# Patient Record
Sex: FEMALE | Race: WHITE | NOT HISPANIC OR LATINO
[De-identification: names, ages, dates, MRNs, and addresses within clinical notes are randomized per-mention and may not be internally consistent; named-entity substitution may affect disease eponyms.]

---

## 2017-01-24 ENCOUNTER — APPOINTMENT (OUTPATIENT)
Dept: CARDIOLOGY | Facility: CLINIC | Age: 37
End: 2017-01-24

## 2017-01-30 ENCOUNTER — APPOINTMENT (OUTPATIENT)
Dept: CARDIOLOGY | Facility: CLINIC | Age: 37
End: 2017-01-30

## 2018-06-07 ENCOUNTER — APPOINTMENT (OUTPATIENT)
Dept: FAMILY MEDICINE | Facility: CLINIC | Age: 38
End: 2018-06-07
Payer: COMMERCIAL

## 2018-06-07 VITALS
RESPIRATION RATE: 15 BRPM | SYSTOLIC BLOOD PRESSURE: 106 MMHG | OXYGEN SATURATION: 98 % | HEIGHT: 61 IN | WEIGHT: 138 LBS | BODY MASS INDEX: 26.06 KG/M2 | TEMPERATURE: 97.8 F | HEART RATE: 95 BPM | DIASTOLIC BLOOD PRESSURE: 74 MMHG

## 2018-06-07 DIAGNOSIS — T78.40XA ALLERGY, UNSPECIFIED, INITIAL ENCOUNTER: ICD-10-CM

## 2018-06-07 DIAGNOSIS — Z87.891 PERSONAL HISTORY OF NICOTINE DEPENDENCE: ICD-10-CM

## 2018-06-07 DIAGNOSIS — Z80.52 FAMILY HISTORY OF MALIGNANT NEOPLASM OF BLADDER: ICD-10-CM

## 2018-06-07 DIAGNOSIS — Z87.09 PERSONAL HISTORY OF OTHER DISEASES OF THE RESPIRATORY SYSTEM: ICD-10-CM

## 2018-06-07 DIAGNOSIS — J06.9 ACUTE UPPER RESPIRATORY INFECTION, UNSPECIFIED: ICD-10-CM

## 2018-06-07 DIAGNOSIS — J30.9 ALLERGIC RHINITIS, UNSPECIFIED: ICD-10-CM

## 2018-06-07 DIAGNOSIS — L65.9 NONSCARRING HAIR LOSS, UNSPECIFIED: ICD-10-CM

## 2018-06-07 LAB
FLUAV SPEC QL CULT: NORMAL
FLUBV AG SPEC QL IA: NORMAL
S PYO AG SPEC QL IA: NORMAL

## 2018-06-07 PROCEDURE — 96372 THER/PROPH/DIAG INJ SC/IM: CPT | Mod: 59

## 2018-06-07 PROCEDURE — 99213 OFFICE O/P EST LOW 20 MIN: CPT | Mod: 25

## 2018-06-07 PROCEDURE — 87804 INFLUENZA ASSAY W/OPTIC: CPT | Mod: 59,QW

## 2018-06-07 PROCEDURE — 87880 STREP A ASSAY W/OPTIC: CPT | Mod: QW

## 2018-06-07 RX ORDER — LEVOCETIRIZINE DIHYDROCHLORIDE 5 MG/1
5 TABLET ORAL DAILY
Qty: 90 | Refills: 2 | Status: ACTIVE | COMMUNITY
Start: 2018-06-07 | End: 1900-01-01

## 2018-06-07 RX ORDER — AZELASTINE HYDROCHLORIDE AND FLUTICASONE PROPIONATE 137; 50 UG/1; UG/1
137-50 SPRAY, METERED NASAL
Qty: 1 | Refills: 2 | Status: ACTIVE | COMMUNITY
Start: 2018-06-07 | End: 1900-01-01

## 2018-06-07 RX ORDER — FLUTICASONE PROPIONATE 50 UG/1
50 SPRAY, METERED NASAL DAILY
Qty: 1 | Refills: 2 | Status: ACTIVE | COMMUNITY
Start: 2018-06-07 | End: 1900-01-01

## 2018-06-07 RX ORDER — DEXAMETHASONE SODIUM PHOSPHATE 10 MG/ML
10 INJECTION INTRAMUSCULAR; INTRAVENOUS
Qty: 0 | Refills: 0 | Status: COMPLETED | OUTPATIENT
Start: 2018-06-07

## 2018-06-07 RX ADMIN — Medication 1.5 MG/ML: at 00:00

## 2018-06-07 NOTE — REVIEW OF SYSTEMS
[Fever] : fever [Chills] : chills [Fatigue] : fatigue [Earache] : earache [Hoarseness] : hoarseness [Nasal Discharge] : nasal discharge [Sore Throat] : sore throat [Negative] : Gastrointestinal

## 2018-06-07 NOTE — PHYSICAL EXAM
[No Acute Distress] : no acute distress [Well Nourished] : well nourished [No Respiratory Distress] : no respiratory distress  [Clear to Auscultation] : lungs were clear to auscultation bilaterally [Normal Rate] : normal rate  [Regular Rhythm] : with a regular rhythm [Normal S1, S2] : normal S1 and S2 [de-identified] : difficulty speaking due to sore throat. pharynx erythema noted

## 2018-06-07 NOTE — HISTORY OF PRESENT ILLNESS
[Earache (L)] : pain in left ear [___ Days ago] : [unfilled] days ago [Congestion] : congestion [Chills] : chills [Earache] : earache [Fatigue] : fatigue [Headache] : headache [Stable] : stable [de-identified] : swollen glands [FreeTextEntry2] : body aches, feverish [FreeTextEntry8] : pt c/o possible sinus  inf./ allergies. Pt states her throat is inflamed hard to swallow and constanlty trying to clear it. Throat feels heavy. She states she was in the park on [place]day and was around a lot of mosquitos.

## 2018-11-16 ENCOUNTER — APPOINTMENT (OUTPATIENT)
Dept: FAMILY MEDICINE | Facility: CLINIC | Age: 38
End: 2018-11-16

## 2018-11-19 ENCOUNTER — APPOINTMENT (OUTPATIENT)
Dept: FAMILY MEDICINE | Facility: CLINIC | Age: 38
End: 2018-11-19
Payer: COMMERCIAL

## 2018-11-19 DIAGNOSIS — Z87.19 PERSONAL HISTORY OF OTHER DISEASES OF THE DIGESTIVE SYSTEM: ICD-10-CM

## 2018-11-19 DIAGNOSIS — Z87.898 PERSONAL HISTORY OF OTHER SPECIFIED CONDITIONS: ICD-10-CM

## 2018-11-19 DIAGNOSIS — J98.01 ACUTE BRONCHOSPASM: ICD-10-CM

## 2018-11-19 DIAGNOSIS — J30.1 ALLERGIC RHINITIS DUE TO POLLEN: ICD-10-CM

## 2018-11-19 DIAGNOSIS — Z87.09 PERSONAL HISTORY OF OTHER DISEASES OF THE RESPIRATORY SYSTEM: ICD-10-CM

## 2018-11-19 DIAGNOSIS — R09.81 NASAL CONGESTION: ICD-10-CM

## 2018-11-19 DIAGNOSIS — R53.81 OTHER MALAISE: ICD-10-CM

## 2018-11-19 DIAGNOSIS — Z87.39 PERSONAL HISTORY OF OTHER DISEASES OF THE MUSCULOSKELETAL SYSTEM AND CONNECTIVE TISSUE: ICD-10-CM

## 2018-11-19 DIAGNOSIS — M50.30 OTHER CERVICAL DISC DEGENERATION, UNSPECIFIED CERVICAL REGION: ICD-10-CM

## 2018-11-19 DIAGNOSIS — M25.561 PAIN IN RIGHT KNEE: ICD-10-CM

## 2018-11-19 DIAGNOSIS — A08.8 OTHER SPECIFIED INTESTINAL INFECTIONS: ICD-10-CM

## 2018-11-19 DIAGNOSIS — M75.101 UNSPECIFIED ROTATOR CUFF TEAR OR RUPTURE OF RIGHT SHOULDER, NOT SPECIFIED AS TRAUMATIC: ICD-10-CM

## 2018-11-19 DIAGNOSIS — J00 ACUTE NASOPHARYNGITIS [COMMON COLD]: ICD-10-CM

## 2018-11-19 DIAGNOSIS — Z87.440 PERSONAL HISTORY OF URINARY (TRACT) INFECTIONS: ICD-10-CM

## 2018-11-19 DIAGNOSIS — R25.2 CRAMP AND SPASM: ICD-10-CM

## 2018-11-19 DIAGNOSIS — J06.9 ACUTE UPPER RESPIRATORY INFECTION, UNSPECIFIED: ICD-10-CM

## 2018-11-19 DIAGNOSIS — R42 DIZZINESS AND GIDDINESS: ICD-10-CM

## 2018-11-19 DIAGNOSIS — R60.0 LOCALIZED EDEMA: ICD-10-CM

## 2018-11-19 DIAGNOSIS — F32.9 MAJOR DEPRESSIVE DISORDER, SINGLE EPISODE, UNSPECIFIED: ICD-10-CM

## 2018-11-19 DIAGNOSIS — R59.9 ENLARGED LYMPH NODES, UNSPECIFIED: ICD-10-CM

## 2018-11-19 DIAGNOSIS — Z86.39 PERSONAL HISTORY OF OTHER ENDOCRINE, NUTRITIONAL AND METABOLIC DISEASE: ICD-10-CM

## 2018-11-19 DIAGNOSIS — R53.83 OTHER MALAISE: ICD-10-CM

## 2018-11-19 DIAGNOSIS — Z01.818 ENCOUNTER FOR OTHER PREPROCEDURAL EXAMINATION: ICD-10-CM

## 2018-11-19 DIAGNOSIS — R22.0 LOCALIZED SWELLING, MASS AND LUMP, HEAD: ICD-10-CM

## 2018-11-19 DIAGNOSIS — Z86.19 PERSONAL HISTORY OF OTHER INFECTIOUS AND PARASITIC DISEASES: ICD-10-CM

## 2018-11-19 DIAGNOSIS — J45.901 UNSPECIFIED ASTHMA WITH (ACUTE) EXACERBATION: ICD-10-CM

## 2018-11-19 DIAGNOSIS — M25.9 JOINT DISORDER, UNSPECIFIED: ICD-10-CM

## 2018-11-19 DIAGNOSIS — R22.1 LOCALIZED SWELLING, MASS AND LUMP, NECK: ICD-10-CM

## 2018-11-19 DIAGNOSIS — M50.00 CERVICAL DISC DISORDER WITH MYELOPATHY, UNSPECIFIED CERVICAL REGION: ICD-10-CM

## 2018-11-19 DIAGNOSIS — K56.50 INTESTINAL ADHESIONS [BANDS], UNSPECIFIED AS TO PARTIAL VERSUS COMPLETE OBSTRUCTION: ICD-10-CM

## 2018-11-19 PROCEDURE — 99213 OFFICE O/P EST LOW 20 MIN: CPT

## 2018-11-19 RX ORDER — ALBUTEROL SULFATE 90 UG/1
108 (90 BASE) AEROSOL, METERED RESPIRATORY (INHALATION)
Refills: 0 | Status: ACTIVE | COMMUNITY

## 2018-11-19 RX ORDER — ALBUTEROL SULFATE 108 UG/1
108 (90 BASE) AEROSOL, METERED RESPIRATORY (INHALATION)
Refills: 0 | Status: ACTIVE | COMMUNITY

## 2018-11-19 RX ORDER — BUDESONIDE AND FORMOTEROL FUMARATE DIHYDRATE 160; 4.5 UG/1; UG/1
160-4.5 AEROSOL RESPIRATORY (INHALATION)
Refills: 0 | Status: ACTIVE | COMMUNITY

## 2018-11-19 RX ORDER — LEVALBUTEROL HYDROCHLORIDE 1.25 MG/3ML
1.25 SOLUTION RESPIRATORY (INHALATION)
Refills: 0 | Status: ACTIVE | COMMUNITY

## 2018-11-19 RX ORDER — IPRATROPIUM BROMIDE AND ALBUTEROL SULFATE 2.5; .5 MG/3ML; MG/3ML
0.5-2.5 (3) SOLUTION RESPIRATORY (INHALATION)
Refills: 0 | Status: ACTIVE | COMMUNITY

## 2018-11-19 RX ORDER — TIZANIDINE HYDROCHLORIDE 4 MG/1
4 CAPSULE ORAL
Refills: 0 | Status: ACTIVE | COMMUNITY

## 2018-11-19 RX ORDER — MELOXICAM 15 MG/1
15 TABLET ORAL
Refills: 0 | Status: ACTIVE | COMMUNITY

## 2018-11-19 RX ORDER — OXYCODONE HYDROCHLORIDE AND ACETAMINOPHEN 5; 325 MG/1; MG/1
5-325 TABLET ORAL
Refills: 0 | Status: ACTIVE | COMMUNITY

## 2018-11-19 NOTE — ASSESSMENT
[FreeTextEntry1] : failed medial approach injection\par aseptic technique 25 g 1# needle \par 20mg kenalog 1.5 cc lidocaine \par blood at needle insertion point only tolerated well\par could not enter joint space \par \par \par

## 2018-11-19 NOTE — HISTORY OF PRESENT ILLNESS
[FreeTextEntry1] : rt knee pain [de-identified] : chronic recurrent 4 days pain and edema injury overuse operative knee Dr Francisco wt bearing bending worse did well with injection in past

## 2018-11-19 NOTE — PHYSICAL EXAM
[No Acute Distress] : no acute distress [de-identified] : rt knee medial tenderness guarded lateral edema

## 2018-11-20 VITALS
HEART RATE: 88 BPM | SYSTOLIC BLOOD PRESSURE: 124 MMHG | TEMPERATURE: 98.9 F | WEIGHT: 148 LBS | BODY MASS INDEX: 27.94 KG/M2 | OXYGEN SATURATION: 95 % | RESPIRATION RATE: 16 BRPM | DIASTOLIC BLOOD PRESSURE: 84 MMHG | HEIGHT: 61 IN

## 2018-12-18 ENCOUNTER — APPOINTMENT (OUTPATIENT)
Dept: FAMILY MEDICINE | Facility: CLINIC | Age: 38
End: 2018-12-18
Payer: COMMERCIAL

## 2018-12-18 VITALS
RESPIRATION RATE: 16 BRPM | HEART RATE: 101 BPM | SYSTOLIC BLOOD PRESSURE: 100 MMHG | DIASTOLIC BLOOD PRESSURE: 74 MMHG | WEIGHT: 145 LBS | TEMPERATURE: 98.3 F | BODY MASS INDEX: 27.38 KG/M2 | OXYGEN SATURATION: 92 % | HEIGHT: 61 IN

## 2018-12-18 DIAGNOSIS — R05 COUGH: ICD-10-CM

## 2018-12-18 DIAGNOSIS — R06.2 WHEEZING: ICD-10-CM

## 2018-12-18 DIAGNOSIS — Z87.09 PERSONAL HISTORY OF OTHER DISEASES OF THE RESPIRATORY SYSTEM: ICD-10-CM

## 2018-12-18 DIAGNOSIS — R68.89 OTHER GENERAL SYMPTOMS AND SIGNS: ICD-10-CM

## 2018-12-18 LAB
FLUAV SPEC QL CULT: NORMAL
FLUBV AG SPEC QL IA: NORMAL
S PYO AG SPEC QL IA: NORMAL

## 2018-12-18 PROCEDURE — 71046 X-RAY EXAM CHEST 2 VIEWS: CPT | Mod: 59

## 2018-12-18 PROCEDURE — 96127 BRIEF EMOTIONAL/BEHAV ASSMT: CPT | Mod: 59

## 2018-12-18 PROCEDURE — 99214 OFFICE O/P EST MOD 30 MIN: CPT | Mod: 25

## 2018-12-18 PROCEDURE — 94640 AIRWAY INHALATION TREATMENT: CPT

## 2018-12-18 PROCEDURE — 87804 INFLUENZA ASSAY W/OPTIC: CPT | Mod: 59,QW

## 2018-12-18 PROCEDURE — 87880 STREP A ASSAY W/OPTIC: CPT | Mod: QW

## 2018-12-18 RX ORDER — HYDROCODONE POLISTIREX AND CHLORPHENIRAMINE POLISTIREX 10; 8 MG/5ML; MG/5ML
10-8 SUSPENSION, EXTENDED RELEASE ORAL
Qty: 100 | Refills: 0 | Status: ACTIVE | COMMUNITY
Start: 2018-12-18 | End: 1900-01-01

## 2018-12-18 RX ORDER — PREDNISONE 20 MG/1
20 TABLET ORAL TWICE DAILY
Qty: 10 | Refills: 0 | Status: ACTIVE | COMMUNITY
Start: 2018-12-18 | End: 1900-01-01

## 2018-12-18 RX ORDER — LEVOFLOXACIN 500 MG/1
500 TABLET, FILM COATED ORAL DAILY
Qty: 7 | Refills: 0 | Status: ACTIVE | COMMUNITY
Start: 1900-01-01 | End: 1900-01-01

## 2018-12-18 RX ORDER — AMOXICILLIN AND CLAVULANATE POTASSIUM 875; 125 MG/1; MG/1
875-125 TABLET, COATED ORAL
Qty: 20 | Refills: 0 | Status: DISCONTINUED | COMMUNITY
Start: 2018-06-07 | End: 2018-12-18

## 2018-12-18 RX ORDER — BENZONATATE 200 MG/1
200 CAPSULE ORAL 3 TIMES DAILY
Qty: 21 | Refills: 1 | Status: ACTIVE | COMMUNITY
Start: 2018-06-07 | End: 1900-01-01

## 2018-12-18 NOTE — HEALTH RISK ASSESSMENT
[No falls in past year] : Patient reported no falls in the past year [0] : 2) Feeling down, depressed, or hopeless: Not at all (0) [] : No [de-identified] : ortho [de-identified] : once a month [[id number]] : 0

## 2018-12-18 NOTE — PHYSICAL EXAM
[Well Nourished] : well nourished [Normal Sclera/Conjunctiva] : normal sclera/conjunctiva [PERRL] : pupils equal round and reactive to light [EOMI] : extraocular movements intact [Normal Outer Ear/Nose] : the outer ears and nose were normal in appearance [Normal Oropharynx] : the oropharynx was normal [No JVD] : no jugular venous distention [Supple] : supple [No Lymphadenopathy] : no lymphadenopathy [Thyroid Normal, No Nodules] : the thyroid was normal and there were no nodules present [No Accessory Muscle Use] : no accessory muscle use [Normal Rate] : normal rate  [Regular Rhythm] : with a regular rhythm [Normal S1, S2] : normal S1 and S2 [No Murmur] : no murmur heard [No Carotid Bruits] : no carotid bruits [No Abdominal Bruit] : a ~M bruit was not heard ~T in the abdomen [No Varicosities] : no varicosities [Pedal Pulses Present] : the pedal pulses are present [No Edema] : there was no peripheral edema [No Extremity Clubbing/Cyanosis] : no extremity clubbing/cyanosis [No Palpable Aorta] : no palpable aorta [Soft] : abdomen soft [Non Tender] : non-tender [Non-distended] : non-distended [No Masses] : no abdominal mass palpated [No HSM] : no HSM [Normal Bowel Sounds] : normal bowel sounds [Normal Posterior Cervical Nodes] : no posterior cervical lymphadenopathy [Normal Anterior Cervical Nodes] : no anterior cervical lymphadenopathy [No CVA Tenderness] : no CVA  tenderness [No Spinal Tenderness] : no spinal tenderness [No Joint Swelling] : no joint swelling [Grossly Normal Strength/Tone] : grossly normal strength/tone [No Rash] : no rash [Normal Gait] : normal gait [Coordination Grossly Intact] : coordination grossly intact [No Focal Deficits] : no focal deficits [Deep Tendon Reflexes (DTR)] : deep tendon reflexes were 2+ and symmetric [Normal Affect] : the affect was normal [Normal Insight/Judgement] : insight and judgment were intact [de-identified] : swollen glands [de-identified] : wheeze, dimished L:S

## 2018-12-18 NOTE — HISTORY OF PRESENT ILLNESS
[FreeTextEntry1] : pt is here for flu/like symptoms achy coughing chills swollen glands sore throat coughing since 12/16/18

## 2018-12-18 NOTE — PLAN
[FreeTextEntry1] : CXR done\par Med neb Tx done\par meds as dir\par flu and strep neg\par f/u in 1 week\par discussed labs needed next visit

## 2018-12-18 NOTE — PAST MEDICAL HISTORY
[Surgical Menopause] : in surgical menopause [Definite ___ (Date)] : the last menstrual period was [unfilled] [FreeTextEntry1] : Total hysterectomy

## 2018-12-18 NOTE — REVIEW OF SYSTEMS
[Chills] : chills [Fatigue] : fatigue [Sore Throat] : sore throat [Postnasal Drip] : postnasal drip [Chest Pain] : chest pain [Shortness Of Breath] : shortness of breath [Wheezing] : wheezing [Cough] : cough [Negative] : Heme/Lymph [FreeTextEntry5] : congestion

## 2018-12-21 ENCOUNTER — APPOINTMENT (OUTPATIENT)
Dept: FAMILY MEDICINE | Facility: CLINIC | Age: 38
End: 2018-12-21

## 2019-06-24 ENCOUNTER — APPOINTMENT (OUTPATIENT)
Dept: FAMILY MEDICINE | Facility: CLINIC | Age: 39
End: 2019-06-24

## 2019-07-01 ENCOUNTER — APPOINTMENT (OUTPATIENT)
Dept: FAMILY MEDICINE | Facility: CLINIC | Age: 39
End: 2019-07-01

## 2020-03-27 RX ORDER — SOFT LENS DISINFECTANT
SOLUTION, NON-ORAL MISCELLANEOUS
Qty: 1 | Refills: 0 | Status: ACTIVE | COMMUNITY
Start: 2018-12-18 | End: 1900-01-01

## 2020-05-05 ENCOUNTER — APPOINTMENT (OUTPATIENT)
Dept: FAMILY MEDICINE | Facility: CLINIC | Age: 40
End: 2020-05-05

## 2020-05-05 ENCOUNTER — APPOINTMENT (OUTPATIENT)
Dept: FAMILY MEDICINE | Facility: CLINIC | Age: 40
End: 2020-05-05
Payer: COMMERCIAL

## 2020-05-05 VITALS
HEART RATE: 99 BPM | TEMPERATURE: 98.6 F | HEIGHT: 61 IN | DIASTOLIC BLOOD PRESSURE: 72 MMHG | RESPIRATION RATE: 15 BRPM | OXYGEN SATURATION: 98 % | SYSTOLIC BLOOD PRESSURE: 112 MMHG | BODY MASS INDEX: 26.81 KG/M2 | WEIGHT: 142 LBS

## 2020-05-05 DIAGNOSIS — G43.709 CHRONIC MIGRAINE W/OUT AURA, NOT INTRACTABLE, W/OUT STATUS MIGRAINOSUS: ICD-10-CM

## 2020-05-05 DIAGNOSIS — F51.09 OTHER INSOMNIA NOT DUE TO A SUBSTANCE OR KNOWN PHYSIOLOGICAL CONDITION: ICD-10-CM

## 2020-05-05 DIAGNOSIS — M54.2 CERVICALGIA: ICD-10-CM

## 2020-05-05 PROCEDURE — 99214 OFFICE O/P EST MOD 30 MIN: CPT

## 2020-05-05 RX ORDER — ZOLPIDEM TARTRATE 10 MG/1
10 TABLET ORAL
Qty: 14 | Refills: 1 | Status: ACTIVE | COMMUNITY
Start: 2020-05-05 | End: 1900-01-01

## 2020-05-05 RX ORDER — SUMATRIPTAN 100 MG/1
100 TABLET, FILM COATED ORAL
Qty: 18 | Refills: 1 | Status: ACTIVE | COMMUNITY
Start: 2018-03-09 | End: 1900-01-01

## 2020-05-05 RX ORDER — MONTELUKAST 10 MG/1
10 TABLET, FILM COATED ORAL
Qty: 30 | Refills: 1 | Status: ACTIVE | COMMUNITY
Start: 2018-06-07 | End: 1900-01-01

## 2020-05-05 NOTE — HISTORY OF PRESENT ILLNESS
[FreeTextEntry1] : Patient here for chronic migraines\par requesting a refill tramadol and Imitrex \par pt states she has a Hx of spinal stenosis, and spinal fusions\par she has an upcoming apt with neuro 7/14/20; and is requesting a refill on her medications because that was the soonest appointment she could make\par pt also c/o right arm pain for about 1 month; believes it is carpal tunnel  [de-identified] : Kevin states she has not been able to receive her occipital nerve block injection from her neurologist. She has exacerbation of headaches and requests refill of medication. She also c/o insomnia due to recent stress and worsened HA. Headaches are pounding , left sided

## 2020-06-08 ENCOUNTER — APPOINTMENT (OUTPATIENT)
Dept: FAMILY MEDICINE | Facility: CLINIC | Age: 40
End: 2020-06-08
Payer: COMMERCIAL

## 2020-06-08 VITALS
RESPIRATION RATE: 15 BRPM | HEART RATE: 113 BPM | SYSTOLIC BLOOD PRESSURE: 100 MMHG | WEIGHT: 143 LBS | TEMPERATURE: 98.2 F | HEIGHT: 61 IN | BODY MASS INDEX: 27 KG/M2 | DIASTOLIC BLOOD PRESSURE: 80 MMHG | OXYGEN SATURATION: 98 %

## 2020-06-08 DIAGNOSIS — M62.838 OTHER MUSCLE SPASM: ICD-10-CM

## 2020-06-08 DIAGNOSIS — M54.42 LUMBAGO WITH SCIATICA, LEFT SIDE: ICD-10-CM

## 2020-06-08 PROCEDURE — 99213 OFFICE O/P EST LOW 20 MIN: CPT

## 2020-06-08 RX ORDER — OXYCODONE AND ACETAMINOPHEN 5; 325 MG/1; MG/1
5-325 TABLET ORAL
Qty: 20 | Refills: 0 | Status: ACTIVE | COMMUNITY
Start: 2018-11-19 | End: 1900-01-01

## 2020-06-08 RX ORDER — TRAMADOL HYDROCHLORIDE 50 MG/1
50 TABLET, COATED ORAL
Qty: 40 | Refills: 0 | Status: ACTIVE | COMMUNITY
Start: 2018-06-19 | End: 1900-01-01

## 2020-06-08 NOTE — HISTORY OF PRESENT ILLNESS
[FreeTextEntry1] : Pt here for follow up\par Pt is sill having neck pain, but also having lower back pain ever since gardening\par Pt cant see neurologist till July 14,2020\par eusebio dodd  [de-identified] : Kevin has chronic neck spasm and occipital pain s/p cervical spine fusion. She states she was gardening yesterday and aggravated her lumbar disc disease/herniation. She c/o left sided sciatica. Tramadol helps during the day but pain is severe at night disrupting her sleep

## 2020-06-08 NOTE — PHYSICAL EXAM
[Normal] : normal sclera/conjunctiva, pupils are equal, round and reactive to light and extraocular movements are intact [Supple] : supple [de-identified] : s/p cervical vertebra fusion, anterior scars

## 2020-06-09 RX ORDER — TIZANIDINE 4 MG/1
4 TABLET ORAL
Qty: 40 | Refills: 1 | Status: ACTIVE | COMMUNITY
Start: 2020-06-08 | End: 1900-01-01

## 2020-06-10 ENCOUNTER — APPOINTMENT (OUTPATIENT)
Dept: FAMILY MEDICINE | Facility: CLINIC | Age: 40
End: 2020-06-10
Payer: COMMERCIAL

## 2020-06-10 VITALS
DIASTOLIC BLOOD PRESSURE: 90 MMHG | SYSTOLIC BLOOD PRESSURE: 132 MMHG | WEIGHT: 146 LBS | TEMPERATURE: 98.4 F | HEART RATE: 115 BPM | HEIGHT: 61 IN | OXYGEN SATURATION: 100 % | BODY MASS INDEX: 27.56 KG/M2 | RESPIRATION RATE: 15 BRPM
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
## 2020-06-10 DIAGNOSIS — Z00.00 ENCOUNTER FOR GENERAL ADULT MEDICAL EXAMINATION W/OUT ABNORMAL FINDINGS: ICD-10-CM

## 2020-06-10 DIAGNOSIS — R51 HEADACHE: ICD-10-CM

## 2020-06-10 PROCEDURE — 36415 COLL VENOUS BLD VENIPUNCTURE: CPT

## 2020-06-10 PROCEDURE — 99213 OFFICE O/P EST LOW 20 MIN: CPT | Mod: 25

## 2020-06-10 NOTE — PHYSICAL EXAM
[No Acute Distress] : no acute distress [Well-Appearing] : well-appearing [No Rash] : no rash [de-identified] : local bite induration

## 2020-06-10 NOTE — HISTORY OF PRESENT ILLNESS
[FreeTextEntry8] : Pt c/o tick bite on may 16,2020  on abdomen \par Alfa dodd  and today removed one\par fatigue intermittent muscle aches\par stable alert NAD\par Review of symptoms as above \par back stable

## 2020-06-10 NOTE — PLAN
[FreeTextEntry1] : follow up as directed.\par follow up if worsen or persists.\par 1 mo sooner as needed.\par

## 2020-06-11 LAB
B BURGDOR AB SER-IMP: NEGATIVE
B BURGDOR IGM PATRN SER IB-IMP: NEGATIVE
B BURGDOR18KD IGG SER QL IB: NORMAL
B BURGDOR23KD IGG SER QL IB: NORMAL
B BURGDOR23KD IGM SER QL IB: NORMAL
B BURGDOR28KD IGG SER QL IB: NORMAL
B BURGDOR30KD IGG SER QL IB: NORMAL
B BURGDOR31KD IGG SER QL IB: NORMAL
B BURGDOR39KD IGG SER QL IB: NORMAL
B BURGDOR39KD IGM SER QL IB: NORMAL
B BURGDOR41KD IGG SER QL IB: PRESENT
B BURGDOR41KD IGM SER QL IB: NORMAL
B BURGDOR45KD IGG SER QL IB: NORMAL
B BURGDOR58KD IGG SER QL IB: NORMAL
B BURGDOR66KD IGG SER QL IB: NORMAL
B BURGDOR93KD IGG SER QL IB: NORMAL
BASOPHILS # BLD AUTO: 0.06 K/UL
BASOPHILS NFR BLD AUTO: 0.9 %
EOSINOPHIL # BLD AUTO: 0.33 K/UL
EOSINOPHIL NFR BLD AUTO: 4.8 %
HCT VFR BLD CALC: 44.1 %
HGB BLD-MCNC: 14.6 G/DL
IMM GRANULOCYTES NFR BLD AUTO: 0.1 %
LYMPHOCYTES # BLD AUTO: 4.06 K/UL
LYMPHOCYTES NFR BLD AUTO: 58.7 %
MAN DIFF?: NORMAL
MCHC RBC-ENTMCNC: 30.3 PG
MCHC RBC-ENTMCNC: 33.1 GM/DL
MCV RBC AUTO: 91.5 FL
MONOCYTES # BLD AUTO: 0.46 K/UL
MONOCYTES NFR BLD AUTO: 6.6 %
NEUTROPHILS # BLD AUTO: 2 K/UL
NEUTROPHILS NFR BLD AUTO: 28.9 %
PLATELET # BLD AUTO: 257 K/UL
RBC # BLD: 4.82 M/UL
RBC # FLD: 12.1 %
WBC # FLD AUTO: 6.92 K/UL

## 2020-06-12 LAB
25(OH)D3 SERPL-MCNC: 26.3 NG/ML
ALBUMIN SERPL ELPH-MCNC: 5 G/DL
ALP BLD-CCNC: 70 U/L
ALT SERPL-CCNC: 12 U/L
ANION GAP SERPL CALC-SCNC: 14 MMOL/L
AST SERPL-CCNC: 16 U/L
BILIRUB SERPL-MCNC: 0.4 MG/DL
BUN SERPL-MCNC: 7 MG/DL
CALCIUM SERPL-MCNC: 10.5 MG/DL
CHLORIDE SERPL-SCNC: 104 MMOL/L
CO2 SERPL-SCNC: 23 MMOL/L
CREAT SERPL-MCNC: 0.75 MG/DL
GLUCOSE SERPL-MCNC: 93 MG/DL
POTASSIUM SERPL-SCNC: 4.2 MMOL/L
PROT SERPL-MCNC: 7.1 G/DL
SODIUM SERPL-SCNC: 140 MMOL/L
TSH SERPL-ACNC: 2.39 UIU/ML

## 2020-08-24 ENCOUNTER — APPOINTMENT (OUTPATIENT)
Dept: FAMILY MEDICINE | Facility: CLINIC | Age: 40
End: 2020-08-24
Payer: COMMERCIAL

## 2020-08-24 VITALS
OXYGEN SATURATION: 99 % | HEART RATE: 103 BPM | DIASTOLIC BLOOD PRESSURE: 80 MMHG | TEMPERATURE: 98.8 F | HEIGHT: 61 IN | RESPIRATION RATE: 16 BRPM | SYSTOLIC BLOOD PRESSURE: 120 MMHG

## 2020-08-24 DIAGNOSIS — R53.83 OTHER FATIGUE: ICD-10-CM

## 2020-08-24 DIAGNOSIS — J45.909 UNSPECIFIED ASTHMA, UNCOMPLICATED: ICD-10-CM

## 2020-08-24 DIAGNOSIS — R06.02 SHORTNESS OF BREATH: ICD-10-CM

## 2020-08-24 PROCEDURE — 99496 TRANSJ CARE MGMT HIGH F2F 7D: CPT

## 2020-08-24 NOTE — HISTORY OF PRESENT ILLNESS
[Post-hospitalization from ___ Hospital] : Post-hospitalization from [unfilled] Hospital [Admitted on: ___] : The patient was admitted on [unfilled] [Discharged on ___] : discharged on [unfilled] [Discharge Summary] : discharge summary [FreeTextEntry2] : was sent by Urgent care to hospital by ambulance due to pt's difficulty to breathe. pt was diagnosed with Acute severe exacerbation of Asthma\par as above tired \par review medical records from admit\par feeling some improved\par neb q6 working too hard to breathe\par cough no wheeze \par

## 2020-08-24 NOTE — REVIEW OF SYSTEMS
[Fatigue] : fatigue [Shortness Of Breath] : shortness of breath [Cough] : cough [Negative] : Heme/Lymph

## 2020-08-24 NOTE — PLAN
[FreeTextEntry1] : neb q4h \par medications as directed.\par pred taper as directed pulm fu\par 1 week sooner as needed.\par er if worsen

## 2020-08-24 NOTE — PHYSICAL EXAM
[Well Developed] : well developed [No Acute Distress] : no acute distress [Well Nourished] : well nourished [Normal Sclera/Conjunctiva] : normal sclera/conjunctiva [PERRL] : pupils equal round and reactive to light [Well-Appearing] : well-appearing [Normal Outer Ear/Nose] : the outer ears and nose were normal in appearance [EOMI] : extraocular movements intact [Normal Oropharynx] : the oropharynx was normal [No JVD] : no jugular venous distention [Supple] : supple [No Lymphadenopathy] : no lymphadenopathy [Thyroid Normal, No Nodules] : the thyroid was normal and there were no nodules present [No Respiratory Distress] : no respiratory distress  [No Accessory Muscle Use] : no accessory muscle use [Clear to Auscultation] : lungs were clear to auscultation bilaterally [Regular Rhythm] : with a regular rhythm [Normal Rate] : normal rate  [No Murmur] : no murmur heard [Normal S1, S2] : normal S1 and S2 [No Carotid Bruits] : no carotid bruits [No Abdominal Bruit] : a ~M bruit was not heard ~T in the abdomen [Pedal Pulses Present] : the pedal pulses are present [No Varicosities] : no varicosities [No Palpable Aorta] : no palpable aorta [No Edema] : there was no peripheral edema [No Extremity Clubbing/Cyanosis] : no extremity clubbing/cyanosis [Soft] : abdomen soft [No Masses] : no abdominal mass palpated [Non-distended] : non-distended [Non Tender] : non-tender [Normal Posterior Cervical Nodes] : no posterior cervical lymphadenopathy [No HSM] : no HSM [Normal Bowel Sounds] : normal bowel sounds [No CVA Tenderness] : no CVA  tenderness [Normal Anterior Cervical Nodes] : no anterior cervical lymphadenopathy [Grossly Normal Strength/Tone] : grossly normal strength/tone [No Joint Swelling] : no joint swelling [No Spinal Tenderness] : no spinal tenderness [No Rash] : no rash [Coordination Grossly Intact] : coordination grossly intact [No Focal Deficits] : no focal deficits [Normal Gait] : normal gait [Normal Affect] : the affect was normal [Normal Insight/Judgement] : insight and judgment were intact [de-identified] : but diminished

## 2020-12-10 ENCOUNTER — NON-APPOINTMENT (OUTPATIENT)
Age: 40
End: 2020-12-10

## 2020-12-16 PROBLEM — Z87.09 HISTORY OF SORE THROAT: Status: RESOLVED | Noted: 2018-06-07 | Resolved: 2020-12-16

## 2020-12-16 PROBLEM — Z87.09 HISTORY OF SORE THROAT: Status: RESOLVED | Noted: 2018-12-18 | Resolved: 2020-12-16

## 2020-12-16 PROBLEM — J06.9 URI, ACUTE: Status: RESOLVED | Noted: 2018-06-07 | Resolved: 2020-12-16

## 2021-05-25 ENCOUNTER — OUTPATIENT (OUTPATIENT)
Dept: OUTPATIENT SERVICES | Facility: HOSPITAL | Age: 41
LOS: 1 days | End: 2021-05-25

## 2022-03-18 PROBLEM — Z98.890 H/O CARDIAC RADIOFREQUENCY ABLATION: Status: ACTIVE | Noted: 2022-02-14

## 2022-03-18 PROBLEM — I47.1 PSVT (PAROXYSMAL SUPRAVENTRICULAR TACHYCARDIA) (HCC): Status: ACTIVE | Noted: 2022-02-14

## 2022-03-18 PROBLEM — I47.10 PSVT (PAROXYSMAL SUPRAVENTRICULAR TACHYCARDIA): Status: ACTIVE | Noted: 2022-02-14

## 2022-03-19 PROBLEM — R06.02 SHORTNESS OF BREATH: Status: ACTIVE | Noted: 2021-12-02

## 2022-05-03 PROBLEM — G44.221 CHRONIC TENSION-TYPE HEADACHE, INTRACTABLE: Status: ACTIVE | Noted: 2022-05-03

## 2022-05-03 PROBLEM — M54.2 NECK PAIN: Status: ACTIVE | Noted: 2022-05-03

## 2022-05-03 PROBLEM — G43.809 CERVICAL MIGRAINE SYNDROME: Status: ACTIVE | Noted: 2022-05-03

## 2022-06-14 ENCOUNTER — OFFICE VISIT (OUTPATIENT)
Dept: ORTHOPEDIC SURGERY | Age: 42
End: 2022-06-14
Payer: COMMERCIAL

## 2022-06-14 DIAGNOSIS — M65.322 ACQUIRED TRIGGER FINGER OF LEFT INDEX FINGER: ICD-10-CM

## 2022-06-14 DIAGNOSIS — G56.21 CUBITAL TUNNEL SYNDROME ON RIGHT: ICD-10-CM

## 2022-06-14 DIAGNOSIS — G56.01 RIGHT CARPAL TUNNEL SYNDROME: ICD-10-CM

## 2022-06-14 DIAGNOSIS — G56.02 LEFT CARPAL TUNNEL SYNDROME: Primary | ICD-10-CM

## 2022-06-14 PROCEDURE — 20550 NJX 1 TENDON SHEATH/LIGAMENT: CPT | Performed by: ORTHOPAEDIC SURGERY

## 2022-06-14 PROCEDURE — 20526 THER INJECTION CARP TUNNEL: CPT | Performed by: ORTHOPAEDIC SURGERY

## 2022-06-14 PROCEDURE — 99204 OFFICE O/P NEW MOD 45 MIN: CPT | Performed by: ORTHOPAEDIC SURGERY

## 2022-06-14 RX ORDER — BETAMETHASONE SODIUM PHOSPHATE AND BETAMETHASONE ACETATE 3; 3 MG/ML; MG/ML
6 INJECTION, SUSPENSION INTRA-ARTICULAR; INTRALESIONAL; INTRAMUSCULAR; SOFT TISSUE ONCE
Status: COMPLETED | OUTPATIENT
Start: 2022-06-14 | End: 2022-06-14

## 2022-06-14 RX ADMIN — BETAMETHASONE SODIUM PHOSPHATE AND BETAMETHASONE ACETATE 6 MG: 3; 3 INJECTION, SUSPENSION INTRA-ARTICULAR; INTRALESIONAL; INTRAMUSCULAR; SOFT TISSUE at 14:23

## 2022-06-14 NOTE — PROGRESS NOTES
Orthopaedic Hand Clinic Note    Name: Nusrat Corona  YOB: 1980  Gender: female  MRN: [id number]      CC: Patient referred for evaluation of upper extremity pain    HPI: Nusrat Corona is a 42 y.o. female Right hand dominant with a chief complaint of bilateral hand numbness and paresthesias, patient has a history of multilevel cervical fusion with some persistent symptoms however, her PCP ordered a nerve conduction study which demonstrated bilateral carpal tunnel syndrome so she was referred here for assessment. She reports numbness and paresthesias mostly in the ulnar distribution of the right hand as well as radiating pain from the medial side of the elbow down into the ulnar side of the hand in the small finger, on the left hand she reports numbness and paresthesias in all fingers as well as inability to fully make a fist, she feels like sometimes the fingers lock and she is unable to make a fist.      ROS/Meds/PSH/PMH/FH/SH: I personally reviewed the patients standard intake form. Pertinents are discussed in the HPI    Physical Examination:  General: Awake and alert. HEENT: Normocephalic, atraumatic  CV/Pulm: Breathing even and unlabored  Skin: No obvious rashes noted. Lymphatic: No obvious evidence of lymphedema or lymphadenopathy    Musculoskeletal Exam:  Examination on the right upper extremity demonstrates cap refill < 5 seconds in all fingers, decree sensation in ulnar distribution compared to the median distribution, positive Tinel and positive carpal tunnel compression test, positive Tinel the cubital tunnel with positive elbow flexion compression test, no thenar or intrinsic weakness or wasting.     Examination of the left upper extremity demonstrates normal sensation median and ulnar distribution, she has a positive Tinel and positive carpal tunnel compression test, negative Tinel at the cubital tunnel, equivocal elbow flexion compression test, no thenar or intrinsic weakness or wasting. Tenderness palpation of the left index finger A1 pulley with palpable clicking but no locking    Imaging / Electrodiagnostic Tests:     Nerve conduction study from outside source was reviewed and independently interpreted, this demonstrates mild bilateral carpal tunnel syndrome with sensory latency of 3.0 on the right and motor latency of 4.7 on the right, 13% decrease in motor conduction velocity across the elbow segment on the right ulnar nerve with motor velocity of 63 compared to 72 below the elbow. On the left side there is median sensory latency at the wrist of 2.5, median motor latency at the wrist of 4.8, decrease in motor conduction velocity at the elbow segment from 74-64    Assessment:   1. Left carpal tunnel syndrome    2. Right carpal tunnel syndrome    3. Acquired trigger finger of left index finger    4. Cubital tunnel syndrome on right        Plan:   We discussed the diagnosis and different treatment options. We discussed observation, therapy, antiinflammatory medications and other pertinent treatment modalities. After discussing in detail the patient elects to proceed with right carpal tunnel release, right cubital tunnel release, possible nerve transposition, we also performed a left carpal tunnel steroid injection in the left index finger A1 pulley steroid injection. She only has a 13% decrease in motor conduction velocity across the elbow segment on the right however, her clinical history and physical exam are consistent with cubital tunnel syndrome. We discussed that she may have some degree of double crush given her cervical spine history and she understands that the recovery is rather unpredictable in that scenario. Procedure Note    The risk, benefits and alternatives of injection and no injection therapy were discussed. The patient consented for an injection. The patient has been identified by name and birthdate.  The injection site was identified, marked and prepped with alcohol swabs. Time out completed. The Left carpal tunnel was injected with 1ml of 6mg/ml Betamethasone and 1ml Lidocaine plain 1%. The injection site was then dressed with a bandaid. The patient tolerated the injection well. The patient was instructed to monitor their blood sugars if diabetic and call if any concerns. The patient was instructed to call the office if any adverse local effects occurred or any if any questions or concerns arise. Procedure Note    The risk, benefits and alternatives of injection and no injection therapy were discussed. The patient consented for an injection. The patient has been identified by name and birthdate. The injection site was identified, marked and prepped with a alcohol swab. Time out completed. The A1 pulley of the Left index finger was/were injected with a 25 gauge needle with 1ml of 6mg/ml Betamethasone and 1ml xylocaine plain 1%. The injection site was then dressed with a bandaid. The patient tolerated the injection well. The patient was instructed to monitor their blood sugars if diabetic and call if any concerns. The patient was instructed to call the office if any adverse local effects occurred or any if any questions or concerns arise. Patient understands risks and benefits of RIGHT CARPAL TUNNEL RELEASE, RIGHT CUBITAL TUNNEL RELEASE, POSSIBLE NERVE TRANSPOSITION including but not limited to nerve injury, vessel injury, infection, failure to achieve desired results and possible need for additional surgery. Patient understands and wishes to proceed with surgery. On Exam:   The patient is alert and oriented; ;   Lung auscultation is clear bilaterally   Heart has RRR without murmurs  . Patient voiced accordance and understanding of the information provided and the formulated plan. All questions were answered to the patient's satisfaction during the encounter.     Sobeida Bingham MD  Orthopaedic Surgery  06/14/22  4:03 PM

## 2022-06-14 NOTE — H&P (VIEW-ONLY)
Orthopaedic Hand Clinic Note    Name: Irene Alcaraz  YOB: 1980  Gender: female  MRN: [id number]      CC: Patient referred for evaluation of upper extremity pain    HPI: Irene Alcaraz is a 42 y.o. female Right hand dominant with a chief complaint of bilateral hand numbness and paresthesias, patient has a history of multilevel cervical fusion with some persistent symptoms however, her PCP ordered a nerve conduction study which demonstrated bilateral carpal tunnel syndrome so she was referred here for assessment. She reports numbness and paresthesias mostly in the ulnar distribution of the right hand as well as radiating pain from the medial side of the elbow down into the ulnar side of the hand in the small finger, on the left hand she reports numbness and paresthesias in all fingers as well as inability to fully make a fist, she feels like sometimes the fingers lock and she is unable to make a fist.      ROS/Meds/PSH/PMH/FH/SH: I personally reviewed the patients standard intake form. Pertinents are discussed in the HPI    Physical Examination:  General: Awake and alert. HEENT: Normocephalic, atraumatic  CV/Pulm: Breathing even and unlabored  Skin: No obvious rashes noted. Lymphatic: No obvious evidence of lymphedema or lymphadenopathy    Musculoskeletal Exam:  Examination on the right upper extremity demonstrates cap refill < 5 seconds in all fingers, decree sensation in ulnar distribution compared to the median distribution, positive Tinel and positive carpal tunnel compression test, positive Tinel the cubital tunnel with positive elbow flexion compression test, no thenar or intrinsic weakness or wasting.     Examination of the left upper extremity demonstrates normal sensation median and ulnar distribution, she has a positive Tinel and positive carpal tunnel compression test, negative Tinel at the cubital tunnel, equivocal elbow flexion compression test, no thenar or intrinsic weakness or wasting. Tenderness palpation of the left index finger A1 pulley with palpable clicking but no locking    Imaging / Electrodiagnostic Tests:     Nerve conduction study from outside source was reviewed and independently interpreted, this demonstrates mild bilateral carpal tunnel syndrome with sensory latency of 3.0 on the right and motor latency of 4.7 on the right, 13% decrease in motor conduction velocity across the elbow segment on the right ulnar nerve with motor velocity of 63 compared to 72 below the elbow. On the left side there is median sensory latency at the wrist of 2.5, median motor latency at the wrist of 4.8, decrease in motor conduction velocity at the elbow segment from 74-64    Assessment:   1. Left carpal tunnel syndrome    2. Right carpal tunnel syndrome    3. Acquired trigger finger of left index finger    4. Cubital tunnel syndrome on right        Plan:   We discussed the diagnosis and different treatment options. We discussed observation, therapy, antiinflammatory medications and other pertinent treatment modalities. After discussing in detail the patient elects to proceed with right carpal tunnel release, right cubital tunnel release, possible nerve transposition, we also performed a left carpal tunnel steroid injection in the left index finger A1 pulley steroid injection. She only has a 13% decrease in motor conduction velocity across the elbow segment on the right however, her clinical history and physical exam are consistent with cubital tunnel syndrome. We discussed that she may have some degree of double crush given her cervical spine history and she understands that the recovery is rather unpredictable in that scenario.     Patient understands risks and benefits of RIGHT CARPAL TUNNEL RELEASE, RIGHT CUBITAL TUNNEL RELEASE, POSSIBLE NERVE TRANSPOSITION including but not limited to nerve injury, vessel injury, infection, failure to achieve desired results and possible need for additional surgery. Patient understands and wishes to proceed with surgery. On Exam:   The patient is alert and oriented; ;   Lung auscultation is clear bilaterally   Heart has RRR without murmurs  . Patient voiced accordance and understanding of the information provided and the formulated plan. All questions were answered to the patient's satisfaction during the encounter.     Anat Mejia MD  Orthopaedic Surgery  06/14/22  4:03 PM

## 2022-06-16 DIAGNOSIS — G56.01 RIGHT CARPAL TUNNEL SYNDROME: Primary | ICD-10-CM

## 2022-06-16 DIAGNOSIS — G56.21 CUBITAL TUNNEL SYNDROME ON RIGHT: ICD-10-CM

## 2022-06-22 RX ORDER — ACETAMINOPHEN 500 MG
500 TABLET ORAL EVERY 6 HOURS PRN
COMMUNITY

## 2022-06-22 RX ORDER — DICLOFENAC SODIUM 75 MG/1
75 TABLET, DELAYED RELEASE ORAL 2 TIMES DAILY
COMMUNITY

## 2022-06-22 RX ORDER — ALBUTEROL SULFATE 90 UG/1
INHALANT RESPIRATORY (INHALATION) AS NEEDED
COMMUNITY

## 2022-06-22 NOTE — PROGRESS NOTES
Patient verified name and . Order for consent not found in EHR; patient verifies procedure. Type 1A surgery, Phone assessment complete. Orders not received. Labs per surgeon: none  Labs per anesthesia protocol: none    Patient answered medical/surgical history questions at their best of ability. All prior to admission medications documented in [facility]. Patient instructed to take the following medications the day of surgery according to anesthesia guidelines with a small sip of water: Neurontin. Hold all vitamins 7 days prior to surgery and NSAIDS 5 days prior to surgery. Prescription meds to hold:Voltaren. Patient instructed on the following:    > Arrive at [facility], time of arrival to be called the day before by 1700  > NPO after midnight including gum, mints, and ice chips  > Responsible adult must drive patient to the hospital, stay during surgery, and patient will need supervision 24 hours after anesthesia  > Use antibacterial Soap in shower the night before surgery and on the morning of surgery  > All piercings must be removed prior to arrival.    > Leave all valuables (money and jewelry) at home but bring insurance card and ID on DOS.   > You may be required to pay a deductible or co-pay on the day of your procedure. You can pre-pay by calling [phone] if your surgery is at the [facility] or [phone] if your surgery is at the [facility]. > Do not wear make-up, nail polish, lotions, cologne, perfumes, powders, or oil on skin. Artificial nails are not permitted.

## 2022-06-26 DIAGNOSIS — G56.01 RIGHT CARPAL TUNNEL SYNDROME: Primary | ICD-10-CM

## 2022-06-26 DIAGNOSIS — G56.21 CUBITAL TUNNEL SYNDROME ON RIGHT: ICD-10-CM

## 2022-06-27 ENCOUNTER — HOSPITAL ENCOUNTER (OUTPATIENT)
Age: 42
Setting detail: OUTPATIENT SURGERY
Discharge: HOME | End: 2022-06-27
Attending: ORTHOPAEDIC SURGERY | Admitting: ORTHOPAEDIC SURGERY
Payer: COMMERCIAL

## 2022-06-27 ENCOUNTER — ANESTHESIA EVENT (OUTPATIENT)
Dept: SURGERY | Age: 42
End: 2022-06-27
Payer: COMMERCIAL

## 2022-06-27 ENCOUNTER — ANESTHESIA (OUTPATIENT)
Dept: SURGERY | Age: 42
End: 2022-06-27
Payer: COMMERCIAL

## 2022-06-27 VITALS
WEIGHT: 158 LBS | DIASTOLIC BLOOD PRESSURE: 75 MMHG | HEART RATE: 66 BPM | TEMPERATURE: 98.5 F | OXYGEN SATURATION: 94 % | SYSTOLIC BLOOD PRESSURE: 128 MMHG | HEIGHT: 61 IN | BODY MASS INDEX: 29.83 KG/M2 | RESPIRATION RATE: 16 BRPM

## 2022-06-27 DIAGNOSIS — G56.21 CUBITAL TUNNEL SYNDROME ON RIGHT: ICD-10-CM

## 2022-06-27 DIAGNOSIS — G56.01 RIGHT CARPAL TUNNEL SYNDROME: ICD-10-CM

## 2022-06-27 PROBLEM — M18.11 ARTHRITIS OF CARPOMETACARPAL (CMC) JOINT OF RIGHT THUMB: Status: ACTIVE | Noted: 2022-06-27

## 2022-06-27 PROCEDURE — 3600000012 HC SURGERY LEVEL 2 ADDTL 15MIN: Performed by: ORTHOPAEDIC SURGERY

## 2022-06-27 PROCEDURE — 3700000000 HC ANESTHESIA ATTENDED CARE: Performed by: ORTHOPAEDIC SURGERY

## 2022-06-27 PROCEDURE — 6360000002 HC RX W HCPCS: Performed by: NURSE ANESTHETIST, CERTIFIED REGISTERED

## 2022-06-27 PROCEDURE — 3700000001 HC ADD 15 MINUTES (ANESTHESIA): Performed by: ORTHOPAEDIC SURGERY

## 2022-06-27 PROCEDURE — 3600000002 HC SURGERY LEVEL 2 BASE: Performed by: ORTHOPAEDIC SURGERY

## 2022-06-27 PROCEDURE — 7100000000 HC PACU RECOVERY - FIRST 15 MIN: Performed by: ORTHOPAEDIC SURGERY

## 2022-06-27 PROCEDURE — 7100000010 HC PHASE II RECOVERY - FIRST 15 MIN: Performed by: ORTHOPAEDIC SURGERY

## 2022-06-27 PROCEDURE — 7100000001 HC PACU RECOVERY - ADDTL 15 MIN: Performed by: ORTHOPAEDIC SURGERY

## 2022-06-27 PROCEDURE — 6360000002 HC RX W HCPCS: Performed by: ANESTHESIOLOGY

## 2022-06-27 PROCEDURE — 2580000003 HC RX 258: Performed by: ANESTHESIOLOGY

## 2022-06-27 PROCEDURE — 76942 ECHO GUIDE FOR BIOPSY: CPT | Performed by: STUDENT IN AN ORGANIZED HEALTH CARE EDUCATION/TRAINING PROGRAM

## 2022-06-27 PROCEDURE — 6370000000 HC RX 637 (ALT 250 FOR IP): Performed by: ANESTHESIOLOGY

## 2022-06-27 PROCEDURE — 6360000002 HC RX W HCPCS: Performed by: NURSE PRACTITIONER

## 2022-06-27 PROCEDURE — 2500000003 HC RX 250 WO HCPCS: Performed by: NURSE ANESTHETIST, CERTIFIED REGISTERED

## 2022-06-27 PROCEDURE — 64721 CARPAL TUNNEL SURGERY: CPT | Performed by: ORTHOPAEDIC SURGERY

## 2022-06-27 PROCEDURE — 2500000003 HC RX 250 WO HCPCS: Performed by: ORTHOPAEDIC SURGERY

## 2022-06-27 PROCEDURE — 2709999900 HC NON-CHARGEABLE SUPPLY: Performed by: ORTHOPAEDIC SURGERY

## 2022-06-27 PROCEDURE — 64718 REVISE ULNAR NERVE AT ELBOW: CPT | Performed by: ORTHOPAEDIC SURGERY

## 2022-06-27 PROCEDURE — 6360000002 HC RX W HCPCS: Performed by: STUDENT IN AN ORGANIZED HEALTH CARE EDUCATION/TRAINING PROGRAM

## 2022-06-27 PROCEDURE — 6360000002 HC RX W HCPCS: Performed by: ORTHOPAEDIC SURGERY

## 2022-06-27 PROCEDURE — 7100000011 HC PHASE II RECOVERY - ADDTL 15 MIN: Performed by: ORTHOPAEDIC SURGERY

## 2022-06-27 PROCEDURE — 2580000003 HC RX 258: Performed by: NURSE ANESTHETIST, CERTIFIED REGISTERED

## 2022-06-27 PROCEDURE — 20600 DRAIN/INJ JOINT/BURSA W/O US: CPT | Performed by: ORTHOPAEDIC SURGERY

## 2022-06-27 RX ORDER — METHYLPREDNISOLONE ACETATE 40 MG/ML
40 INJECTION, SUSPENSION INTRA-ARTICULAR; INTRALESIONAL; INTRAMUSCULAR; SOFT TISSUE ONCE
Status: DISCONTINUED | OUTPATIENT
Start: 2022-06-27 | End: 2022-06-27

## 2022-06-27 RX ORDER — METHYLPREDNISOLONE ACETATE 40 MG/ML
INJECTION, SUSPENSION INTRA-ARTICULAR; INTRALESIONAL; INTRAMUSCULAR; SOFT TISSUE PRN
Status: DISCONTINUED | OUTPATIENT
Start: 2022-06-27 | End: 2022-06-27 | Stop reason: ALTCHOICE

## 2022-06-27 RX ORDER — LABETALOL HYDROCHLORIDE 5 MG/ML
10 INJECTION, SOLUTION INTRAVENOUS
Status: DISCONTINUED | OUTPATIENT
Start: 2022-06-27 | End: 2022-06-27 | Stop reason: HOSPADM

## 2022-06-27 RX ORDER — SODIUM CHLORIDE 0.9 % (FLUSH) 0.9 %
5-40 SYRINGE (ML) INJECTION EVERY 12 HOURS SCHEDULED
Status: DISCONTINUED | OUTPATIENT
Start: 2022-06-27 | End: 2022-06-27 | Stop reason: HOSPADM

## 2022-06-27 RX ORDER — ONDANSETRON 4 MG/1
4 TABLET, ORALLY DISINTEGRATING ORAL EVERY 8 HOURS PRN
Qty: 20 TABLET | Refills: 0 | Status: SHIPPED | OUTPATIENT
Start: 2022-06-27

## 2022-06-27 RX ORDER — SODIUM CHLORIDE 0.9 % (FLUSH) 0.9 %
5-40 SYRINGE (ML) INJECTION PRN
Status: DISCONTINUED | OUTPATIENT
Start: 2022-06-27 | End: 2022-06-27 | Stop reason: HOSPADM

## 2022-06-27 RX ORDER — OXYCODONE HYDROCHLORIDE 5 MG/1
5 TABLET ORAL PRN
Status: COMPLETED | OUTPATIENT
Start: 2022-06-27 | End: 2022-06-27

## 2022-06-27 RX ORDER — PROPOFOL 10 MG/ML
INJECTION, EMULSION INTRAVENOUS PRN
Status: DISCONTINUED | OUTPATIENT
Start: 2022-06-27 | End: 2022-06-27 | Stop reason: SDUPTHER

## 2022-06-27 RX ORDER — MIDAZOLAM HYDROCHLORIDE 2 MG/2ML
2 INJECTION, SOLUTION INTRAMUSCULAR; INTRAVENOUS
Status: COMPLETED | OUTPATIENT
Start: 2022-06-27 | End: 2022-06-27

## 2022-06-27 RX ORDER — ROPIVACAINE HYDROCHLORIDE 5 MG/ML
INJECTION, SOLUTION EPIDURAL; INFILTRATION; PERINEURAL
Status: COMPLETED | OUTPATIENT
Start: 2022-06-27 | End: 2022-06-27

## 2022-06-27 RX ORDER — FENTANYL CITRATE 50 UG/ML
100 INJECTION, SOLUTION INTRAMUSCULAR; INTRAVENOUS
Status: DISCONTINUED | OUTPATIENT
Start: 2022-06-27 | End: 2022-06-27 | Stop reason: HOSPADM

## 2022-06-27 RX ORDER — DEXAMETHASONE SODIUM PHOSPHATE 4 MG/ML
INJECTION, SOLUTION INTRA-ARTICULAR; INTRALESIONAL; INTRAMUSCULAR; INTRAVENOUS; SOFT TISSUE PRN
Status: DISCONTINUED | OUTPATIENT
Start: 2022-06-27 | End: 2022-06-27 | Stop reason: SDUPTHER

## 2022-06-27 RX ORDER — SODIUM CHLORIDE, SODIUM LACTATE, POTASSIUM CHLORIDE, CALCIUM CHLORIDE 600; 310; 30; 20 MG/100ML; MG/100ML; MG/100ML; MG/100ML
INJECTION, SOLUTION INTRAVENOUS CONTINUOUS
Status: DISCONTINUED | OUTPATIENT
Start: 2022-06-27 | End: 2022-06-27 | Stop reason: HOSPADM

## 2022-06-27 RX ORDER — ONDANSETRON 2 MG/ML
INJECTION INTRAMUSCULAR; INTRAVENOUS PRN
Status: DISCONTINUED | OUTPATIENT
Start: 2022-06-27 | End: 2022-06-27 | Stop reason: SDUPTHER

## 2022-06-27 RX ORDER — SODIUM CHLORIDE 9 MG/ML
INJECTION, SOLUTION INTRAVENOUS PRN
Status: DISCONTINUED | OUTPATIENT
Start: 2022-06-27 | End: 2022-06-27 | Stop reason: HOSPADM

## 2022-06-27 RX ORDER — LIDOCAINE HYDROCHLORIDE 10 MG/ML
1 INJECTION, SOLUTION EPIDURAL; INFILTRATION; INTRACAUDAL; PERINEURAL
Status: DISCONTINUED | OUTPATIENT
Start: 2022-06-27 | End: 2022-06-27 | Stop reason: HOSPADM

## 2022-06-27 RX ORDER — FENTANYL CITRATE 50 UG/ML
25 INJECTION, SOLUTION INTRAMUSCULAR; INTRAVENOUS
Status: DISCONTINUED | OUTPATIENT
Start: 2022-06-27 | End: 2022-06-27 | Stop reason: HOSPADM

## 2022-06-27 RX ORDER — PROPOFOL 10 MG/ML
INJECTION, EMULSION INTRAVENOUS CONTINUOUS PRN
Status: DISCONTINUED | OUTPATIENT
Start: 2022-06-27 | End: 2022-06-27 | Stop reason: SDUPTHER

## 2022-06-27 RX ORDER — GLUCAGON 1 MG/ML
1 KIT INJECTION PRN
Status: DISCONTINUED | OUTPATIENT
Start: 2022-06-27 | End: 2022-06-27 | Stop reason: HOSPADM

## 2022-06-27 RX ORDER — HYDROMORPHONE HYDROCHLORIDE 2 MG/ML
0.25 INJECTION, SOLUTION INTRAMUSCULAR; INTRAVENOUS; SUBCUTANEOUS EVERY 5 MIN PRN
Status: DISCONTINUED | OUTPATIENT
Start: 2022-06-27 | End: 2022-06-27 | Stop reason: HOSPADM

## 2022-06-27 RX ORDER — DEXTROSE MONOHYDRATE 50 MG/ML
100 INJECTION, SOLUTION INTRAVENOUS PRN
Status: DISCONTINUED | OUTPATIENT
Start: 2022-06-27 | End: 2022-06-27 | Stop reason: HOSPADM

## 2022-06-27 RX ORDER — OXYCODONE HYDROCHLORIDE 5 MG/1
10 TABLET ORAL PRN
Status: COMPLETED | OUTPATIENT
Start: 2022-06-27 | End: 2022-06-27

## 2022-06-27 RX ORDER — LIDOCAINE HYDROCHLORIDE 10 MG/ML
INJECTION, SOLUTION INFILTRATION; PERINEURAL PRN
Status: DISCONTINUED | OUTPATIENT
Start: 2022-06-27 | End: 2022-06-27 | Stop reason: ALTCHOICE

## 2022-06-27 RX ORDER — ACETAMINOPHEN 500 MG
1000 TABLET ORAL ONCE
Status: COMPLETED | OUTPATIENT
Start: 2022-06-27 | End: 2022-06-27

## 2022-06-27 RX ORDER — LIDOCAINE HYDROCHLORIDE 20 MG/ML
INJECTION, SOLUTION EPIDURAL; INFILTRATION; INTRACAUDAL; PERINEURAL PRN
Status: DISCONTINUED | OUTPATIENT
Start: 2022-06-27 | End: 2022-06-27 | Stop reason: SDUPTHER

## 2022-06-27 RX ORDER — ONDANSETRON 2 MG/ML
4 INJECTION INTRAMUSCULAR; INTRAVENOUS
Status: DISCONTINUED | OUTPATIENT
Start: 2022-06-27 | End: 2022-06-27 | Stop reason: HOSPADM

## 2022-06-27 RX ORDER — HYDROMORPHONE HYDROCHLORIDE 2 MG/ML
0.5 INJECTION, SOLUTION INTRAMUSCULAR; INTRAVENOUS; SUBCUTANEOUS EVERY 5 MIN PRN
Status: DISCONTINUED | OUTPATIENT
Start: 2022-06-27 | End: 2022-06-27 | Stop reason: HOSPADM

## 2022-06-27 RX ORDER — HYDROCODONE BITARTRATE AND ACETAMINOPHEN 5; 325 MG/1; MG/1
1 TABLET ORAL EVERY 4 HOURS PRN
Qty: 28 TABLET | Refills: 0 | Status: SHIPPED | OUTPATIENT
Start: 2022-06-27 | End: 2022-07-02

## 2022-06-27 RX ADMIN — SODIUM CHLORIDE, POTASSIUM CHLORIDE, SODIUM LACTATE AND CALCIUM CHLORIDE: 600; 310; 30; 20 INJECTION, SOLUTION INTRAVENOUS at 09:55

## 2022-06-27 RX ADMIN — ACETAMINOPHEN 1000 MG: 500 TABLET, FILM COATED ORAL at 09:46

## 2022-06-27 RX ADMIN — ONDANSETRON 4 MG: 2 INJECTION INTRAMUSCULAR; INTRAVENOUS at 11:12

## 2022-06-27 RX ADMIN — MIDAZOLAM 2 MG: 1 INJECTION INTRAMUSCULAR; INTRAVENOUS at 09:46

## 2022-06-27 RX ADMIN — DEXAMETHASONE SODIUM PHOSPHATE 4 MG: 4 INJECTION, SOLUTION INTRAMUSCULAR; INTRAVENOUS at 11:11

## 2022-06-27 RX ADMIN — MEPIVACAINE HYDROCHLORIDE 15 ML: 15 INJECTION, SOLUTION EPIDURAL; INFILTRATION at 09:46

## 2022-06-27 RX ADMIN — LIDOCAINE HYDROCHLORIDE 40 MG: 20 INJECTION, SOLUTION EPIDURAL; INFILTRATION; INTRACAUDAL; PERINEURAL at 10:59

## 2022-06-27 RX ADMIN — DEXAMETHASONE SODIUM PHOSPHATE 4 MG: 4 INJECTION, SOLUTION INTRAMUSCULAR; INTRAVENOUS at 09:51

## 2022-06-27 RX ADMIN — Medication 2000 MG: at 10:59

## 2022-06-27 RX ADMIN — ROPIVACAINE HYDROCHLORIDE 15 ML: 5 INJECTION, SOLUTION EPIDURAL; INFILTRATION; PERINEURAL at 09:46

## 2022-06-27 RX ADMIN — PROPOFOL 50 MG: 10 INJECTION, EMULSION INTRAVENOUS at 10:59

## 2022-06-27 RX ADMIN — PROPOFOL 50 MG: 10 INJECTION, EMULSION INTRAVENOUS at 11:03

## 2022-06-27 RX ADMIN — OXYCODONE 10 MG: 5 TABLET ORAL at 12:18

## 2022-06-27 RX ADMIN — PHENYLEPHRINE HYDROCHLORIDE 50 MCG: 10 INJECTION INTRAVENOUS at 11:21

## 2022-06-27 RX ADMIN — PROPOFOL 120 MCG/KG/MIN: 10 INJECTION, EMULSION INTRAVENOUS at 10:59

## 2022-06-27 RX ADMIN — FENTANYL CITRATE 100 MCG: 50 INJECTION, SOLUTION INTRAMUSCULAR; INTRAVENOUS at 09:46

## 2022-06-27 ASSESSMENT — PAIN DESCRIPTION - LOCATION: LOCATION: HAND

## 2022-06-27 ASSESSMENT — PAIN DESCRIPTION - ORIENTATION: ORIENTATION: RIGHT

## 2022-06-27 ASSESSMENT — PAIN SCALES - GENERAL
PAINLEVEL_OUTOF10: 7
PAINLEVEL_OUTOF10: 3

## 2022-06-27 ASSESSMENT — ENCOUNTER SYMPTOMS: SHORTNESS OF BREATH: 1

## 2022-06-27 ASSESSMENT — PAIN DESCRIPTION - DESCRIPTORS: DESCRIPTORS: BURNING

## 2022-06-27 NOTE — ANESTHESIA PRE PROCEDURE
Department of Anesthesiology  Preprocedure Note       Name:  Mak Lloyd   Age:  42 y.o.  :  1980                                          MRN:  [id number]         Date:  2022      Surgeon: Christine Cintron):  Vonda George MD    Procedure: Procedure(s):  Open right carpal tunnel release  Right cubital tunnel release on the right    Medications prior to admission:   Prior to Admission medications    Medication Sig Start Date End Date Taking? Authorizing Provider   diclofenac (VOLTAREN) 75 MG EC tablet Take 75 mg by mouth 2 times daily   Yes Historical Provider, MD   acetaminophen (TYLENOL) 500 MG tablet Take 500 mg by mouth every 6 hours as needed for Pain   Yes Historical Provider, MD   Fluticasone Furoate-Vilanterol (BREO ELLIPTA IN) Inhale into the lungs in the morning and at bedtime   Yes Historical Provider, MD   Albuterol Sulfate, sensor, (PROAIR DIGIHALER) 108 (90 Base) MCG/ACT AEPB Inhale into the lungs as needed   Yes Historical Provider, MD   ondansetron (ZOFRAN ODT) 4 MG disintegrating tablet Take 1 tablet by mouth every 8 hours as needed for Nausea or Vomiting 22   ROSAURA Younger CNP   HYDROcodone-acetaminophen (NORCO) 5-325 MG per tablet Take 1 tablet by mouth every 4 hours as needed for Pain for up to 5 days. Intended supply: 7 days. Take lowest dose possible to manage pain 22  ROSAURA Younger CNP   fluticasone (FLONASE) 50 MCG/ACT nasal spray 1 spray by Nasal route in the morning and at bedtime     Ar Automatic Reconciliation   gabapentin (NEURONTIN) 300 MG capsule Take by mouth.  600mg in am and 900mg at bedtime 3/22/22   Ar Automatic Reconciliation   lidocaine (LIDODERM) 5 % Place 1 patch onto the skin as needed     Ar Automatic Reconciliation   montelukast (SINGULAIR) 10 MG tablet Take 10 mg by mouth nightly  22   Ar Automatic Reconciliation   SUMAtriptan (IMITREX) 50 MG tablet One at onset of migraine and can repeat once in one hour for continued headache Do note exceed 100 mg in 24 hours 2/2/22   Ar Automatic Reconciliation   tiZANidine (ZANAFLEX) 4 MG tablet Take 4 mg by mouth at bedtime  2/2/22   Ar Automatic Reconciliation       Current medications:    Current Facility-Administered Medications   Medication Dose Route Frequency Provider Last Rate Last Admin    ceFAZolin (ANCEF) 2000 mg in sterile water 20 mL IV syringe  2,000 mg IntraVENous On Call to [address], APRN - CNP        sodium chloride flush 0.9 % injection 5-40 mL  5-40 mL IntraVENous 2 times per day Amber Serna, APRN - CNP        sodium chloride flush 0.9 % injection 5-40 mL  5-40 mL IntraVENous PRN Mandysonia Anderson, APRN - CNP        0.9 % sodium chloride infusion   IntraVENous PRN Jana Anderson, APRN - CNP        lidocaine PF 1 % injection 1 mL  1 mL IntraDERmal Once PRN Zeke Willams MD        fentaNYL (SUBLIMAZE) injection 100 mcg  100 mcg IntraVENous Q1H PRN Zeke Willams MD   100 mcg at 06/27/22 0946    Or    fentaNYL (SUBLIMAZE) injection 25 mcg  25 mcg IntraVENous Q1H PRN Zeke Willams MD        lactated ringers infusion   IntraVENous Continuous Zeke Willams MD        sodium chloride flush 0.9 % injection 5-40 mL  5-40 mL IntraVENous 2 times per day Zeke Willams MD        sodium chloride flush 0.9 % injection 5-40 mL  5-40 mL IntraVENous PRN Zeke Willams MD        0.9 % sodium chloride infusion   IntraVENous PRN Zeke Willams MD           Allergies:     Allergies   Allergen Reactions    Morphine Nausea And Vomiting and Rash    Sulfa Antibiotics Rash       Problem List:    Patient Active Problem List   Diagnosis Code    PSVT (paroxysmal supraventricular tachycardia) (HCC) I47.1    Mitral valve regurgitation I34.0    H/O cardiac radiofrequency ablation Z98.890    Shortness of breath R06.02    Malignant neoplasm of breast ([facility] [address] C50.919    Malignant neoplasm of urinary bladder (HCC) C67.9    Focal nodular hyperplasia of liver K76.89    Cervical migraine syndrome G43.809    Neck pain M54.2    Chronic tension-type headache, intractable G44.221    Right carpal tunnel syndrome G56.01    Cubital tunnel syndrome on right G56.21    Arthritis of carpometacarpal (CMC) joint of right thumb M18.11       Past Medical History:        Diagnosis Date    Asthma     Fatty liver     Fibromyalgia     Former cigarette smoker     GERD (gastroesophageal reflux disease)     no current meds    History of bladder cancer     6 weeks of BCG    History of blood transfusion 2013    History of breast cancer     Tamoxifen for 3 years/ Right breast- lumpectomy    History of COVID-19 12/2020    pt hospitalized on BIPAP    History of gastric ulcer     PONV (postoperative nausea and vomiting)     post-op. Pt does well with antiemetic    Retention of urine     X1 with cervical fusion.     Seasonal allergies     SVT (supraventricular tachycardia) (HCC)     attempted ablation in 2002- last episodes of SVT- 2004       Past Surgical History:        Procedure Laterality Date    APPENDECTOMY      BREAST LUMPECTOMY Right     breast cancer  lumpectomy    CERVICAL FUSION      X2    CHOLECYSTECTOMY      HX CYSTECTOMY      ORTHOPEDIC SURGERY Right 2010    reconstructive knee surgery on Right knee    ABIDA AND BSO (CERVIX REMOVED)      TONSILLECTOMY AND ADENOIDECTOMY         Social History:    Social History     Tobacco Use    Smoking status: Former Smoker     Packs/day: 1.00     Years: 7.00     Pack years: 7.00     Types: Cigarettes     Start date: 1/1/2005     Quit date: 1/1/2012     Years since quitting: 10.4    Smokeless tobacco: Never Used   Substance Use Topics    Alcohol use: Yes     Comment: occassionally                                Counseling given: Not Answered      Vital Signs (Current):   Vitals:    06/22/22 0916 06/27/22 0922   BP:  117/82   Pulse:  84   Resp:  18   Temp:  98.3 °F (36.8 °C)   TempSrc:  Oral   SpO2:  97%   Weight: 158 lb (71.7 kg) 158 lb (71.7 kg) Height: 5' 1.25\" (1.556 m)                                               BP Readings from Last 3 Encounters:   06/27/22 117/82   02/02/22 130/71       NPO Status: Time of last liquid consumption: 2359                        Time of last solid consumption: 2359                        Date of last liquid consumption: 06/26/22                        Date of last solid food consumption: 06/26/22    BMI:   Wt Readings from Last 3 Encounters:   06/27/22 158 lb (71.7 kg)   04/19/22 159 lb (72.1 kg)   02/02/22 159 lb (72.1 kg)     Body mass index is 29.61 kg/m². CBC: No results found for: WBC, RBC, HGB, HCT, MCV, RDW, PLT    CMP: No results found for: NA, K, CL, CO2, BUN, CREATININE, GFRAA, AGRATIO, LABGLOM, GLUCOSE, GLU, PROT, CALCIUM, BILITOT, ALKPHOS, AST, ALT    POC Tests: No results for input(s): POCGLU, POCNA, POCK, POCCL, POCBUN, POCHEMO, POCHCT in the last 72 hours. Coags: No results found for: PROTIME, INR, APTT    HCG (If Applicable): No results found for: PREGTESTUR, PREGSERUM, HCG, HCGQUANT     ABGs: No results found for: PHART, PO2ART, MLM5AND, JBV4XEP, BEART, N6AIMJMZ     Type & Screen (If Applicable):  No results found for: LABABO, LABRH    Drug/Infectious Status (If Applicable):  No results found for: HIV, HEPCAB    COVID-19 Screening (If Applicable): No results found for: COVID19        Anesthesia Evaluation  Patient summary reviewed and Nursing notes reviewed   history of anesthetic complications: PONV.   Airway: Mallampati: II  TM distance: >3 FB   Neck ROM: full  Mouth opening: > = 3 FB   Dental: normal exam         Pulmonary:normal exam    (+) shortness of breath: no interval change,  asthma (last exacerbation/steroid use 11/2021):                            Cardiovascular:  Exercise tolerance: good (>4 METS),   (+) dysrhythmias (psvt): SVT,                   Neuro/Psych:   (+) headaches:,             GI/Hepatic/Renal:   (+) GERD: well controlled,           Endo/Other: Negative Endo/Other ROS Abdominal:             Vascular: negative vascular ROS. Other Findings:           Anesthesia Plan      TIVA     ASA 3       Induction: intravenous. Anesthetic plan and risks discussed with patient (family).               Post-op pain plan if not by surgeon: single peripheral nerve block            Moy Haddad MD   6/27/2022

## 2022-06-27 NOTE — ANESTHESIA PROCEDURE NOTES
Peripheral Block    Patient location during procedure: pre-op  Reason for block: procedure for pain, post-op pain management and at surgeon's request  Start time: 6/27/2022 9:46 AM  End time: 6/27/2022 9:51 AM  Staffing  Performed: anesthesiologist   Anesthesiologist: Mary Jeffers MD  Preanesthetic Checklist  Completed: patient identified, IV checked, site marked, risks and benefits discussed, surgical/procedural consents, equipment checked, pre-op evaluation, timeout performed, anesthesia consent given, oxygen available, monitors applied/VS acknowledged, fire risk safety assessment completed and verbalized and blood product R/B/A discussed and consented  Peripheral Block   Patient position: supine  Prep: ChloraPrep  Provider prep: mask  Block type: Brachial plexus  Supraclavicular  Laterality: right  Injection technique: single-shot  Guidance: ultrasound guided    Needle   Needle type: insulated echogenic nerve stimulator needle   Needle gauge: 20 G  Needle localization: ultrasound guidance  Assessment   Injection assessment: negative aspiration for heme, no paresthesia on injection, local visualized surrounding nerve on ultrasound and no intravascular symptoms  Paresthesia pain: none  Slow fractionated injection: yes  Hemodynamics: stable  Real-time US image taken/store: yes  Outcomes: patient tolerated procedure well    Medications Administered  Ropivacaine (NAROPIN) injection 0.5%, 15 mL  mepivacaine (CARBOCAINE) injection 1.5%, 15 mL DISPLAY PLAN FREE TEXT

## 2022-06-27 NOTE — INTERVAL H&P NOTE
H&P Update:  Mariano Bee was seen and examined. History and physical has been reviewed. The patient has been examined.  There have been no significant clinical changes since the completion of the originally dated History and Physical.    Lazaro Powell MD  Orthopaedic Surgery  06/27/22  9:30 AM

## 2022-06-28 NOTE — ANESTHESIA POSTPROCEDURE EVALUATION
Department of Anesthesiology  Postprocedure Note    Patient: Branden Santoyo  MRN: [id number]  YOB: 1980  Date of evaluation: 6/28/2022      Procedure Summary     Date: 06/27/22 Room / Location: [facility] OP OR 06 / [facility]    Anesthesia Start: 1054 Anesthesia Stop: 1138    Procedures:       Open right carpal tunnel release (Right Hand)      Right cubital tunnel release on the right (Right Elbow) Diagnosis:       Right carpal tunnel syndrome      Cubital tunnel syndrome on right      (Right carpal tunnel syndrome [G56.01])      (Cubital tunnel syndrome on right [G56.21])    Surgeons: Germaine Kwon MD Responsible Provider: Adan Livingston MD    Anesthesia Type: TIVA ASA Status: 3          Anesthesia Type: No value filed.     Collette Phase I: Collette Score: 8    Collette Phase II: Collette Score: 10      Anesthesia Post Evaluation    Patient location during evaluation: bedside  Patient participation: complete - patient participated  Level of consciousness: awake and alert  Pain score: 1  Airway patency: patent  Nausea & Vomiting: no vomiting  Complications: no  Cardiovascular status: hemodynamically stable  Respiratory status: acceptable  Hydration status: euvolemic

## 2022-06-28 NOTE — OP NOTE
Hand Surgery Operative Note      Irene Alcaraz   42 y.o.   female   6/27/2022     Pre-op diagnosis: Right Carpal Tunnel syndrome and Cubital Tunnel Syndrome  Post op diagnosis: same      Procedure: Right Carpal Tunnel and Cubital  Tunnel release,    Surgeon: Arnulfo Dawkins MD, PhD      Anesthesia: Regional block + MAC      Tourniquet time:   Total Tourniquet Time Documented:  Arm  (Right) - 21 minutes  Total: Arm  (Right) - 21 minutes        Procedure indications: Patient with radial and ulnar digit numbness recalcitrant to conservative measures with positive documentation of NCV findings consistent with carpal tunnel syndrome as well as symptoms and/or NCS findings of cubital tunnel syndrome. After Thorough discussion, the patient decided to proceed with surgical management. We discussed in detail surgical risks including scar, pain, bleeding, infection, anesthetic risks, neurovascular injury, need for further surgery,  weakness, stiffness, risk of death and potential risk of other unforseen complication. Procedure description:      Patient was placed in the supine position and after appropriate time-out and side, site and procedure confirmed. The carpal tunnel incision incision was made in the palm in line with the radial border of the ring finger under loupe magnification and palmar fascia was incised longitudinally. Blunt retraction used to identify the transverse carpal ligament, which was incised, visualizing the median nerve beneath, which was protected with a slotted freer. The remaining ligament was released with a [place] meniscal blade under direct visualization. The ligament was released in its entirety, and visualized at its most proximal and distal extent.      The cubital tunnel incision was made in the medial border of the elbow adjacent to the medial epicondyle under loupe magnification, blunt dissection was performed with care to preserve superficial sensory branches, cubital tunnel fascia was incised longitudinally. Blunt retraction used to identify the ulnar nerve. Griffin ligament and fascia were released in its entirety, the nerve was visualized at its most proximal (along the medial intermuscular septum) and distal extent of the cubital tunnel (under the two heads of the FCU). The medial intermuscular septum was inspected and did not cause further compression. The elbow was taken through a range of motion and the nerve was not dislocating. The decision was made not to transpose the nerve. The right thumb CMC joint was injected with 0.5cc of Depomedrol 40mg/ml and 0.5cc of Bupivacaine 0.25% plain. Wounds were irrigated, tourniquet released and hemostasis was obtained with bipolar cautery. Skin edges were infiltrated with . 25% Bupivacaine. Carpal tunnel wound was then closed with 4-0 prolene and sterile dressing applied. The cubital tunnel wound was closed with 3-0 vicryl and running 4-0 Stratafix and sterile dressing applied. Disposition: To PACU with no complications and follow up per routine. Patient is instructed to keep dressing on until follow up and other precautions include avoidance of heavy and repetitive lifting for 2 weeks, when an appointment for follow up and suture removal will take place.      Shruthi Todd MD  06/28/22  7:35 AM

## 2022-07-03 ENCOUNTER — PATIENT MESSAGE (OUTPATIENT)
Dept: ORTHOPEDIC SURGERY | Age: 42
End: 2022-07-03

## 2022-07-03 DIAGNOSIS — G56.01 RIGHT CARPAL TUNNEL SYNDROME: Primary | ICD-10-CM

## 2022-07-05 DIAGNOSIS — G56.01 RIGHT CARPAL TUNNEL SYNDROME: Primary | ICD-10-CM

## 2022-07-05 RX ORDER — AMITRIPTYLINE HYDROCHLORIDE 25 MG/1
25 TABLET, FILM COATED ORAL NIGHTLY
Qty: 20 TABLET | Refills: 0 | Status: SHIPPED | OUTPATIENT
Start: 2022-07-05 | End: 2022-07-25

## 2022-07-05 RX ORDER — TRAMADOL HYDROCHLORIDE 50 MG/1
50 TABLET ORAL EVERY 6 HOURS PRN
Qty: 20 TABLET | Refills: 0 | Status: SHIPPED | OUTPATIENT
Start: 2022-07-05 | End: 2022-07-10

## 2022-07-08 ENCOUNTER — OFFICE VISIT (OUTPATIENT)
Dept: ORTHOPEDIC SURGERY | Age: 42
End: 2022-07-08

## 2022-07-08 DIAGNOSIS — G56.01 RIGHT CARPAL TUNNEL SYNDROME: Primary | ICD-10-CM

## 2022-07-08 DIAGNOSIS — G56.21 CUBITAL TUNNEL SYNDROME ON RIGHT: ICD-10-CM

## 2022-07-08 PROCEDURE — A9999 DME SUPPLY OR ACCESSORY, NOS: HCPCS | Performed by: NURSE PRACTITIONER

## 2022-07-08 PROCEDURE — 99024 POSTOP FOLLOW-UP VISIT: CPT | Performed by: NURSE PRACTITIONER

## 2022-07-08 NOTE — LETTER
DME Patient Authorization Form    Name: Patricia Vanessa  : 1980  Age: 42 y.o. Gender: female  Delivery Address: [facility]     Diagnosis:   1. Right carpal tunnel syndrome    2. Cubital tunnel syndrome on right         Requested DME:  Compressive Elbow Sleeve x1 right         Clinical Notes:     **Indicates non-covered items by insurance. Payment expected on date of service. Electronically signed by  Provider __Mandy Anderson NP______________________     Date: 2022______________________                             [facility] Tax ID # [id number]        Durable Medical Equipment and/or Orthotics Patient Consent     I understand that my physician has prescribed this medical supply as part of my treatment plan as a matter of Medical Necessity.  I understand that I have a choice in where I receive my prescribed orthopedic supplies and/or services.  I authorize [facility] to furnish this service/product and to provide my insurance carrier with any information requested in order to process for payment.  I instruct my insurance carrier to pay [facility] directly for these services/products.  I understand that my insurance carrier may deny payment for this supply because it is a non-covered item, deemed not medically necessary or considered experimental.   I understand that any cost not covered by my insurance carrier will be solely my financial responsibility.  I have received the Supplier Standards and have reviewed them.  I have received the prescribed item and have been fully instructed on the proper use of the above services/products.    ______ (Patient Initials) I understand that all DME items are non-returnable after being dispensed. Items still in sealed packaging may be returned up to 14 days after purchasing.  [address] [facility] will replace items that are defective.    ______ (Patient Initials) I understand that Isaias Boykin will not file a claim with my insurance carrier for this service/product and I am waiving my right to file a claim on my own for this service/product with my insurance company as this item is NON-COVERED (Denoted by the **) by my Insurance company/policy. ______ (Patient Initials) I understand that I am responsible to bring my equipment to the hospital for any surgery. ______________________________________________  ________________________  Patient / Bree Savage            Thank you for considering [address] Your physician has prescribed specific medical equipment or devices for your home use. The following describes your rights and responsibilities as our customer. Right to Choose Providers: You have a choice regarding which company supplies your home medical equipment and devices, and to consult your physician in this decision. You may choose a medical supply store, a home medical equipment provider, or a specialist such as [facility]. [facility] will coordinate with your physician to provide the medical equipment or devices prescribed for your home use. Right to Service:  You have the right to considerate, respectful and nondiscriminatory care. You have the right to receive accurate and easily understood information about your health care. If you speak a foreign language, or don't understand the discussions, assistance will be provided to allow you to make informed health care decisions. You have the right to know your treatment options and to participate in decisions about your care, including the right to accept or refuse treatment. You have the right to expect a reasonable response to your requests for treatment or service.   You have the right to talk in confidence with health care providers and to have your health care information protected. You have the right to receive an explanation of your bill. You have the right to complain about the service or product you receive. Patient Responsibilities:  Please provide complete and accurate information about your health insurance benefits and make arrangements for the timely payment of your bill. [facility] will, if possible, assume responsibility for billing your insurance (Medicare, Medicaid and commercial) for the prescribed equipment or devices. If your policy does not cover the prescribed product, or only covers a portion of the bill, you are responsible for any remaining balance. Return and Exchange Policy:  [facility] will honor published  Warranties for products. [facility] will accept returns or exchanges within 14 days from the date of receipt, providin) the product must be in new condition; 2) receipt as required; and 3) used disposable and hygiene products may only be returned due to a defective product. Note: Refunds will be issued in a timely manner, please allow 4-6 weeks for processing. Complaint Procedures and DME Consumer Protection Resources:  [facility] values you as a customer, and is committed to resolving patient concerns. This commitment includes understanding and documenting your concerns, conducting a review of internal procedures, and providing you with an explanation and resolution to your concerns. Should you have any questions about our services or billing process, please contact our office at (practice phone number). If we are unable to resolve the concern, you have the right to direct comments to the office of Consumer Protection, in the [address] or the [facility] office, without fear of repercussion. DMEPOS SUPPLIER STANDARDS    A supplier must be in compliance with all applicable Federal and [employer] and regulatory requirements.   A supplier must provide complete and accurate information on the DMEPOS supplier application. Any changes to this information must be reported to the [facility] within 30 days. An authorized individual (one whose signature is binding) must sign the application for billing privileges. A supplier must fill orders from its own inventory, or must contract with other companies for the purchase of items necessary to fill the order. A supplier may not contract with any entity that is currently excluded from the Medicare program, any [facility] program, or from any other Federal procurement or Nonprocurement programs. A supplier must advise beneficiaries that they may rent or purchase inexpensive or routinely purchased durable medical equipment, and of the purchase option for capped rental equipment. A supplier must notify beneficiaries of warranty coverage and honor all warranties under applicable State Law, and repair or replace free of charge Medicare covered items that are under warranty. A supplier must maintain a physical facility on an appropriate site. A supplier must permit CMS, or its agents to conduct on-site inspections to ascertain the supplier's compliance with these standards. The supplier location must be accessible to beneficiaries during reasonable business hours, and must maintain a visible sign and posted hours of operation. A supplier must maintain a primary business telephone listed under the name of the business in a [employer] or a toll free number available through directory assistance. The exclusive use of a beeper, answering machine or cell phone is prohibited. A supplier must have comprehensive liability insurance in the amount of at least $300,000 that covers both the supplier's place of business and all customers and employees of the supplier.   If the supplier manufactures its own items, this insurance must also cover product liability and completed operations. A supplier must agree not to initiate telephone contact with beneficiaries, with a few exceptions allowed. This standard prohibits suppliers from calling beneficiaries in order to solicit new business. A supplier is responsible for delivery and must instruct beneficiaries on use of Medicare covered items, and maintain proof of delivery. A supplier must answer questions, and respond to complaints of the beneficiaries, and maintain documentation of such contacts. A supplier must maintain and replace at no charge or repair directly, or through a service contract with another company, Medicare covered items it has rented to beneficiaries. A supplier must accept returns of substandard (less than full quality for the particular item) or unsuitable items (inappropriate for the beneficiary at the time it was fitted and rented or sold) from beneficiaries. A supplier must disclose these supplier standards to each beneficiary to whom it supplies a Medicare-covered item. A supplier must disclose to the government any person having ownership, financial, or control interest in the supplier. A supplier must not convey or reassign a supplier number; i.e., the supplier may not sell or allow another entity to use its Medicare billing number. A supplier must have a complaint resolution protocol established to address beneficiary complaints that relate to these standards. A record of these complaints must be maintained at the physical facility. Complaint records must include: the name, address, telephone number and health insurance claim number of the beneficiary, a summary of the complaint, and any action taken to resolve it. A supplier must agree to furnish CMS any information required by the Medicare statute and implementing regulations. A supplier of DMEPOS and other items and services must be accredited by a CMS-approved accreditation organization in order to receive and retain a supplier billing number. The accreditation must indicate the specific products and services, for which the supplier is accredited in order for the supplier to receive payment for those specific products and services. A DMEPOS supplier must notify their accreditation organization when a new DMEPOS location is opened. The accreditation organization may accredit the new supplier location for three months after it is operational without requiring a new site visit. All DMEPOS supplier locations, whether owned or subcontracted, must meet the [employer] and be separately accredited in order to bill Medicare. An accredited supplier may be denied enrollment or their enrollment may be revoked, if CMS determines that they are not in compliance with the DMEPOS quality standards. A DMEPOS supplier must disclose upon enrollment all products and services, including the addition of new product lines for which they are seeking accreditation. If a new product line is added after enrollment, the DMEPOS supplier will be responsible for notifying the accrediting body of the new product so that the DMEPOS supplier can be re-surveyed and accredited for these new products. Must meet the surety bond requirements specified in 42 C. F.R. 424.57(c). Implementation date- May 4, 2009. A supplier must obtain oxygen from a state-licensed oxygen supplier. A supplier must maintain ordering and referring documentation consistent with provisions found in 42 C. F.R. 424.516(f). DMEPOS suppliers are prohibited from sharing a practice location with certain other Medicare providers and suppliers. DMEPOS suppliers must remain open to the public for a minimum of 30 hours per week with certain exceptions.

## 2022-07-08 NOTE — PROGRESS NOTES
Orthopaedic Hand Surgery Note    Name: Khadar Vanessa  YOB: 1980  Gender: female  MRN: [id number]    HPI: Patient is status post Open right carpal tunnel release - Right and Right cubital tunnel release on the right - Right on 6/27/2022. Patient reports sensation is improved. Night symptoms have resolved. No fevers or chills. She is having some discomfort on the palm of her hand due to accidentally picking up a pot yesterday. She is also having some swelling in her elbow. Physical Examination:  Wound healing well. Sutures are intact. No erythema or drainage. Sensation is improved from preoperative. She is unable to make a composite fist.  I can range her elbow from 10 degrees of extension to 120 degrees of flexion. She is neurovascular intact with good capillary refill. Assessment:   1. Right carpal tunnel syndrome    2. Cubital tunnel syndrome on right        Status post Open right carpal tunnel release - Right and Right cubital tunnel release on the right - Right on 6/27/2022    Plan:  I will remove her suture. We discussed that the hand can get wet uncovered in the shower but no soaking the hand for 2 more weeks. After washing the hand the incision needs to be patted dry and covered. Lifting will be kept at 2 lbs and under with avoidance of undue stress on the hand for 2 weeks. Patient will see me in 4 weeks for reevaluation. Give her a compressive elbow sleeve. We will get her into therapy. We will see her back in 4 weeks and at that time we will assess her left side for left carpal tunnel release and left trigger fingers release.     Aldo Jett NP  Orthopaedic Surgery  07/08/22  9:22 AM

## 2022-07-08 NOTE — LETTER
[address],[facility] [address]  Phone: [phone]  Fax: ROSAURA Mena CNP        July 8, 2022     Patient: Kierra Fishman   YOB: 1980   Date of Visit: 7/8/2022       To Whom It May Concern: It is my medical opinion that Sandi Gramajo may return to work on 7/11/2022 with the following restrictions: Patient will only work 3 days weekly for 4-6 hours a day until her follow up appointment in 4 weeks. .    If you have any questions or concerns, please don't hesitate to call.     Sincerely,        ROSAURA Benavides CNP

## 2022-07-08 NOTE — PROGRESS NOTES
Patient was fitted and instructed on a Reparel Elbow Sleeve for the right elbow. Patient read and signed documenting they understand and agree to [facility]'s current DME return policy.

## 2022-07-08 NOTE — LETTER
[address] [facility]  [address] [place] [address],[facility] [address]  Phone: [phone]  Fax: ROSAURA Mena CNP        July 8, 2022     Patient: Ninfa Hudson   YOB: 1980   Date of Visit: 7/8/2022       To Whom It May Concern: It is my medical opinion that David Denton {Work release (duty restriction):14090}. If you have any questions or concerns, please don't hesitate to call.     Sincerely,        ROSAURA Cuenca CNP
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
## 2022-07-12 ENCOUNTER — OFFICE VISIT (OUTPATIENT)
Dept: ORTHOPEDIC SURGERY | Age: 42
End: 2022-07-12
Payer: COMMERCIAL

## 2022-07-12 DIAGNOSIS — M25.50 PAIN IN JOINT, MULTIPLE SITES: Primary | ICD-10-CM

## 2022-07-12 DIAGNOSIS — M25.40 EFFUSION OF MULTIPLE JOINTS: ICD-10-CM

## 2022-07-12 DIAGNOSIS — Z78.9 DECREASED ACTIVITIES OF DAILY LIVING (ADL): ICD-10-CM

## 2022-07-12 DIAGNOSIS — M62.81 MUSCLE WEAKNESS: ICD-10-CM

## 2022-07-12 DIAGNOSIS — R20.9 SENSATION DISTURBANCE OF SKIN: ICD-10-CM

## 2022-07-12 DIAGNOSIS — M25.60 STIFFNESS OF JOINTS, MULTIPLE SITES: ICD-10-CM

## 2022-07-12 PROCEDURE — 97110 THERAPEUTIC EXERCISES: CPT | Performed by: OCCUPATIONAL THERAPIST

## 2022-07-12 PROCEDURE — 97165 OT EVAL LOW COMPLEX 30 MIN: CPT | Performed by: OCCUPATIONAL THERAPIST

## 2022-07-14 ENCOUNTER — TELEPHONE (OUTPATIENT)
Dept: ORTHOPEDIC SURGERY | Age: 42
End: 2022-07-14

## 2022-07-14 NOTE — TELEPHONE ENCOUNTER
Pt called to cancel her appointment secondary to vomiting. Pt has appointments scheduled for next week and is aware.

## 2022-07-15 ENCOUNTER — PATIENT MESSAGE (OUTPATIENT)
Dept: NEUROLOGY | Age: 42
End: 2022-07-15

## 2022-07-19 ENCOUNTER — TELEPHONE (OUTPATIENT)
Dept: ORTHOPEDIC SURGERY | Age: 42
End: 2022-07-19

## 2022-07-25 ENCOUNTER — TELEPHONE (OUTPATIENT)
Dept: ORTHOPEDIC SURGERY | Age: 42
End: 2022-07-25

## 2022-07-27 NOTE — PROGRESS NOTES
[address] [facility]  [address] [facility]  [place]  Dept: [phone]      Occupational Therapy Daily Note     Referring MD: Claudell Mans, APRN - C*    Diagnosis:     ICD-10-CM    1. Pain in joint, multiple sites  M25.50       2. Stiffness of joints, multiple sites  M25.60       3. Effusion of multiple joints  M25.40       4. Decreased activities of daily living (ADL)  Z78.9       5. Sensation disturbance of skin  R20.9       6. Muscle weakness  M62.81                    Surgery/Medical Dx: Date 6-27-22 Right Carpal Tunnel release and cubital tunnel release (3 weeks, 1 day post op)     Therapy precautions: None  Pt has pre-existing neck and back pain    History of injury/onset : Per MD note;\" Aftab Rizvi is a 42 y.o. female Right hand dominant with a chief complaint of bilateral hand numbness and paresthesias, patient has a history of multilevel cervical fusion with some persistent symptoms however, her PCP ordered a nerve conduction study which demonstrated bilateral carpal tunnel syndrome so she was referred here for assessment. She reports numbness and paresthesias mostly in the ulnar distribution of the right hand as well as radiating pain from the medial side of the elbow down into the ulnar side of the hand in the small finger, on the left hand she reports numbness and paresthesias in all fingers as well as inability to fully make a fist, she feels like sometimes the fingers lock and she is unable to make a fist.\"  \"Nerve conduction study from outside source was reviewed and independently interpreted, this demonstrates mild bilateral carpal tunnel syndrome with sensory latency of 3.0 on the right and motor latency of 4.7 on the right, 13% decrease in motor conduction velocity across the elbow segment on the right ulnar nerve with motor velocity of 63 compared to 72 below the elbow.   On the left side there is median sensory latency at the wrist of 2.5, median motor latency at the wrist of 4.8, decrease in motor conduction velocity at the elbow segment from 74-64. \"   \"Patient elects to proceed with right carpal tunnel release, right cubital tunnel release, possible nerve transposition, we also performed a left carpal tunnel steroid injection in the left index finger A1 pulley steroid injection. She only has a 13% decrease in motor conduction velocity across the elbow segment on the right however, her clinical history and physical exam are consistent with cubital tunnel syndrome. We discussed that she may have some degree of double crush given her cervical spine history and she understands that the recovery is rather unpredictable in that scenario. \"       Total Direct Treatment Time: 30 min  Total In Office Time: 65 min    Preferred Name: Francine Burk   Pt reporting that she is doing much better than she was on initial evaluation. Pt rates pain 2-3/10 upon entering this session. Current Symptoms/Chief complaints:   Chief Complaint   Patient presents with    Hand Pain           OBJECTIVE     A/PROM Measures:  Pain noted for all motion below  Shoulder A/PROM: RIGHT   IE RIGHT  7/28/22                  Shoulder flex  140°  140     Shoulder IR/ER Sacrum/  68°  Bra level/75     Shoulder Abd 122°  135       Elbow /ForearmA/PROM: RIGHT  IE RIGHT  7/2/22     Elbow extension/flexion -14/135°  -5/150     Pronation/Supination 90/90°  90/90       .   Wrist A/PROM: RIGHT  IE RIGHT  7/28/22     Wrist Extension/Flexion 55/55°  62/75     RD/UD 28/37°  30/37         Digital ROM: IF  IE MF  IE RF  IE SF  IE   AROM Flexion to [facility] 4cm 4cm 2.7 cm 1.3 cm   PROM to [facility] NT NT NT       NT   AROM Flexion to [facility]:  7/28/22  all digits are able to touch  Swelling/Edema: IE Circumferential 14 cm R, 14 cm L at wrist                                IE: 1 \" below elbow crease: Circumferential 24.3 cm R, 24 cm L                       1 \" above elbow crease  Circumferential 26 cm R, 25.8 cm L 7/28/22:  Circumferential 14 cm R, 14 cm L at wrist                                7/28/22: 1 \" below elbow crease: Circumferential 24.0 cm R, 24 cm L                       1 \" above elbow crease  Circumferential 26 cm R, 25.8 cm L     Treatment:  Fluidotherapy (81564)  x 15 minutes to right hand/wrist for desensitization and preparation for treatment. Manual Therapy (73774) x 15 minutes: Addressing soft tissue/joint restrictions and swelling of  right UE:    STM to the palm and wrist area in preparation for PROM and tissue lengthening of the wrist/forearm components  Retrograde edema massage- added to HEP  Scar mobilization by therapist- added to HEP    Therapeutic exercise (11160) x 15 min:  Review of Home Exercise Program development and Education: see below. Measurements taken  Wrist Tubi- size C  Opposition with foam blocks to facilitate grasp/pinch strength  Desensitization with rice and lentils over incision  Therapist guided desensitization with varied textures added to HEP    Access Code: [email or web address]  URL: [email or web address]  Date: 07/12/2022  Prepared by: Diego Forrester    Exercises  Wrist Tendon Gliding - 3-5 x weekly - 1-3 sets - 10-15 reps  Wrist Flexion Extension AROM with Fingers Curled and Palm Down - 5 x daily - 7 x weekly - 15 reps - 2 sets  Seated Forearm Pronation Supination AROM - 5 x daily - 7 x weekly - 3 sets - 15 reps - 2 sec hold  Standing Elbow Flexion Extension AROM - 3-5 x weekly - 1-3 sets - 10-15 reps  Seated Scapular Retraction - 3-5 x weekly - 1-3 sets - 10-15 reps  Median Nerve Flossing - Tray - 2-3 x daily - 7 x weekly - 2 sets - 10 reps  Ulnar Nerve Flossing - 2 x daily - 7 x weekly - 1 sets - 15 reps - 2 sec hold    Patient Education  ASHT Cubital Tunnel  Carpal Tunnel Syndrome      Access Code: [email or web address]  URL: [email or web address]  Date: 07/28/2022  Prepared by: Diego Forrester  Exercises:   Towel Desensitization - 3-5 x weekly - 1-3 sets - 10-15 reps    Patient Education:  Scar massage and Retrograde Massage    ASSESSMENT     Pt presents this date with a greatly improved presentation. Measurements taken as seen above. Her pain has drastically improved and her ROM has improved. Pt has been in the pool at home. Discussed benefits of exercises in pool to decrease sensitivity and increase ROM and strength of UE. Discussed exercises that she can do in the pool  Pt has been compliant with HEP. Added to HEP as seen above. Plan   Continue OT 2x/week to address desensitization, ROM, decreasing pain, and nerve flossing. GOALS   Short term goals: 8/25/2022 (4 weeks):  Patient will be I with HEP. Pt will be able to perform desensitization activities x 10 min total with pain of no more than 5/10. Increase AROM affected wrist extension/flexion to [facility] to improve function with dressing and bathing. Pt will be able to make a full composite fist with the affected hand so that the pt is able to grasp and hold objects during self care. Pt will be able to increase right AROM shoulder flexion to 150 deg and abd to 135 deg to increase pt's ability to reach for objects. Decrease pain in right hand  from 8 to 6 in order to sleep through the night and use affected hand with all ADL's. Assess  and pinch strength and set goals accordingly. Improve Quick DASH functional assessment score from 65.1% deficit to less than 55% deficit. Long term goals:  9/22/2022 (8 weeks)  Decrease pain in right hand  to 4 or less in order to sleep through the night and use affected hand with all ADL's. Patient will have no visible edema in affected hand, wrist, or elbow. Pt will have minimal sensitivity to touch over right UE. Pt will have full elbow AROM. Pt will be able to obtain shoulder AROM within 10 deg of left shoulder AROM measurements  to maximize pt's ability to reach over head to perform functional tasks.      Pts Quick DASH functional assessment score will be less than 40% functional deficit.      [facility] Portal     OT Protocols

## 2022-07-28 ENCOUNTER — OFFICE VISIT (OUTPATIENT)
Dept: ORTHOPEDIC SURGERY | Age: 42
End: 2022-07-28
Payer: COMMERCIAL

## 2022-07-28 DIAGNOSIS — M25.40 EFFUSION OF MULTIPLE JOINTS: ICD-10-CM

## 2022-07-28 DIAGNOSIS — R20.9 SENSATION DISTURBANCE OF SKIN: ICD-10-CM

## 2022-07-28 DIAGNOSIS — M62.81 MUSCLE WEAKNESS: ICD-10-CM

## 2022-07-28 DIAGNOSIS — M25.60 STIFFNESS OF JOINTS, MULTIPLE SITES: ICD-10-CM

## 2022-07-28 DIAGNOSIS — M25.50 PAIN IN JOINT, MULTIPLE SITES: Primary | ICD-10-CM

## 2022-07-28 DIAGNOSIS — Z78.9 DECREASED ACTIVITIES OF DAILY LIVING (ADL): ICD-10-CM

## 2022-07-28 PROCEDURE — 97110 THERAPEUTIC EXERCISES: CPT | Performed by: OCCUPATIONAL THERAPIST

## 2022-07-28 PROCEDURE — 97022 WHIRLPOOL THERAPY: CPT | Performed by: OCCUPATIONAL THERAPIST

## 2022-07-28 PROCEDURE — 97140 MANUAL THERAPY 1/> REGIONS: CPT | Performed by: OCCUPATIONAL THERAPIST

## 2022-08-07 NOTE — INTERVAL H&P NOTE
H&P Update:  Rebeca Loaiza was seen and examined. History and physical has been reviewed. The patient has been examined. There have been significant clinical changes since the completion of the originally dated History and Physical. Patient also complains of right thumb CMC joint pain and would like to add a steroid injection to her previously planned procedures which include right carpal tunnel release, right cubital tunnel release and possible nerve transposition.     Gordy Solomon MD  Orthopaedic Surgery  06/27/22  9:33 AM No

## 2022-08-09 ENCOUNTER — OFFICE VISIT (OUTPATIENT)
Dept: ORTHOPEDIC SURGERY | Age: 42
End: 2022-08-09

## 2022-08-09 DIAGNOSIS — G56.21 CUBITAL TUNNEL SYNDROME ON RIGHT: ICD-10-CM

## 2022-08-09 DIAGNOSIS — G56.01 RIGHT CARPAL TUNNEL SYNDROME: ICD-10-CM

## 2022-08-09 DIAGNOSIS — M65.342 TRIGGER RING FINGER OF LEFT HAND: ICD-10-CM

## 2022-08-09 DIAGNOSIS — M65.312 TRIGGER FINGER OF LEFT THUMB: ICD-10-CM

## 2022-08-09 DIAGNOSIS — M65.322 TRIGGER INDEX FINGER OF LEFT HAND: ICD-10-CM

## 2022-08-09 DIAGNOSIS — G56.02 LEFT CARPAL TUNNEL SYNDROME: Primary | ICD-10-CM

## 2022-08-09 PROCEDURE — 99024 POSTOP FOLLOW-UP VISIT: CPT | Performed by: NURSE PRACTITIONER

## 2022-08-09 NOTE — PROGRESS NOTES
Orthopaedic Hand Surgery Note    Name: Khadar Vanessa  YOB: 1980  Gender: female  MRN: [id number]    HPI: Patient is status post Open right carpal tunnel release - Right and Right cubital tunnel release on the right - Right on 6/27/2022. Patient reports sensation is improved. Patient reports some mild soreness to the palmar aspect of her right hand. She is working 4 to 6 hours/day 3 days/week. She notes more soreness after she works. She is ready to schedule surgery for the left side. Physical Examination:  Right upper extremity:  Wounds healed. Good finger and wrist range of motion. Sensation is improved from preoperative. He has full range of motion to the elbow including 0 degrees of extension to 130 degrees of flexion, 90 degrees of pronation and supination. She is able to make a composite fist.  She has full extension of her digits. Left upper extremity:  Examination of the left upper extremity demonstrates Decreased sensation to light touch in the median distribution, normal sensation in ulnar and radial distribution, positive carpal tunnel compression testing and Phalen testing, cap refill < 5 seconds in all fingers. Inspection reveals no thenar atrophy. Negative Tinel and elbow flexion compression test of the cubital tunnel, negative Tinel over Guyon's canal. Sensation to light touch in the ulnar 2 digits is normal with no intrinsic atrophy/weakness. No tenderness to palpation or masses noted in the forearm. positive tenderness of the left thumb, left index, left ring A1 pulley with palpable clicking and  negative   locking. The extensor tendons all track well over the MCP joints. Assessment:   1. Left carpal tunnel syndrome    2. Right carpal tunnel syndrome    3. Cubital tunnel syndrome on right    4. Trigger finger of left thumb    5. Trigger index finger of left hand    6.  Trigger ring finger of left hand        Status post Open right carpal tunnel release - Right and Right cubital tunnel release on the right - Right on 6/27/2022    Plan:  Patient will continue therapy for the right side. We will increase her work status to 4 to 6 hours/day 5 days/week. We will get her surgery set up for the left at her convenience. Patient understands risks and benefits of OPEN LEFT CARPAL TUNNEL RELEASE, LEFT THUMB TRIGGER RELEASE, LEFT INDEX TRIGGER FINGER RELEASE, LEFT RING TRIGGER FINGER RELEASE including but not limited to nerve injury, vessel injury, infection, failure to achieve desired results and possible need for additional surgery. Patient understands and wishes to proceed with surgery.      On Exam:   The patient is alert and oriented; ;   Lung auscultation is clear bilaterally   Heart has RRR without murmurs    Emile Flanagan NP  Orthopaedic Surgery  08/09/22  10:47 AM

## 2022-08-11 ENCOUNTER — TELEPHONE (OUTPATIENT)
Dept: ORTHOPEDIC SURGERY | Age: 42
End: 2022-08-11

## 2022-08-11 DIAGNOSIS — M65.342 TRIGGER RING FINGER OF LEFT HAND: ICD-10-CM

## 2022-08-11 DIAGNOSIS — G56.02 LEFT CARPAL TUNNEL SYNDROME: Primary | ICD-10-CM

## 2022-08-11 DIAGNOSIS — M65.322 TRIGGER INDEX FINGER OF LEFT HAND: ICD-10-CM

## 2022-08-11 DIAGNOSIS — M65.312 TRIGGER FINGER OF LEFT THUMB: ICD-10-CM

## 2022-08-11 PROBLEM — M65.30 TRIGGER FINGER OF LEFT HAND: Status: ACTIVE | Noted: 2022-08-11

## 2022-08-11 NOTE — TELEPHONE ENCOUNTER
I spoke with the patient to let her know when I spoke with Anuja SALINAS yesterday they said we are still out of network. I did advise her to call them to see if she can get an exemption to go out of network.

## 2022-08-15 ENCOUNTER — OFFICE VISIT (OUTPATIENT)
Dept: ORTHOPEDIC SURGERY | Age: 42
End: 2022-08-15
Payer: COMMERCIAL

## 2022-08-15 VITALS — WEIGHT: 161 LBS | BODY MASS INDEX: 30.4 KG/M2 | HEIGHT: 61 IN

## 2022-08-15 DIAGNOSIS — G89.29 CHRONIC PAIN OF LEFT KNEE: ICD-10-CM

## 2022-08-15 DIAGNOSIS — M25.561 RIGHT KNEE PAIN, UNSPECIFIED CHRONICITY: Primary | ICD-10-CM

## 2022-08-15 DIAGNOSIS — M17.11 PRIMARY OSTEOARTHRITIS OF RIGHT KNEE: ICD-10-CM

## 2022-08-15 DIAGNOSIS — M25.562 CHRONIC PAIN OF LEFT KNEE: ICD-10-CM

## 2022-08-15 PROCEDURE — 20610 DRAIN/INJ JOINT/BURSA W/O US: CPT | Performed by: ORTHOPAEDIC SURGERY

## 2022-08-15 PROCEDURE — 99204 OFFICE O/P NEW MOD 45 MIN: CPT | Performed by: ORTHOPAEDIC SURGERY

## 2022-08-15 RX ORDER — METHYLPREDNISOLONE ACETATE 80 MG/ML
80 INJECTION, SUSPENSION INTRA-ARTICULAR; INTRALESIONAL; INTRAMUSCULAR; SOFT TISSUE ONCE
Status: COMPLETED | OUTPATIENT
Start: 2022-08-15 | End: 2022-08-15

## 2022-08-15 RX ORDER — DICLOFENAC SODIUM 75 MG/1
75 TABLET, DELAYED RELEASE ORAL 2 TIMES DAILY
Qty: 60 TABLET | Refills: 0 | Status: CANCELLED | OUTPATIENT
Start: 2022-08-15

## 2022-08-15 RX ORDER — BUPIVACAINE HYDROCHLORIDE 5 MG/ML
4.5 INJECTION, SOLUTION PERINEURAL ONCE
Status: COMPLETED | OUTPATIENT
Start: 2022-08-15 | End: 2022-08-15

## 2022-08-15 RX ORDER — LIDOCAINE HYDROCHLORIDE 20 MG/ML
4.5 INJECTION, SOLUTION INFILTRATION; PERINEURAL ONCE
Status: COMPLETED | OUTPATIENT
Start: 2022-08-15 | End: 2022-08-15

## 2022-08-15 RX ADMIN — METHYLPREDNISOLONE ACETATE 80 MG: 80 INJECTION, SUSPENSION INTRA-ARTICULAR; INTRALESIONAL; INTRAMUSCULAR; SOFT TISSUE at 10:33

## 2022-08-15 RX ADMIN — LIDOCAINE HYDROCHLORIDE 4.5 ML: 20 INJECTION, SOLUTION INFILTRATION; PERINEURAL at 10:33

## 2022-08-15 RX ADMIN — BUPIVACAINE HYDROCHLORIDE 4.5 ML: 5 INJECTION, SOLUTION PERINEURAL at 10:32

## 2022-08-15 NOTE — PROGRESS NOTES
Name: Kierra Fishman  YOB: 1980  Gender: female  MRN: [id number]      What: Bilateral knee pain right greater than left  How: Long history      HPI: Kierra Fishman is a 36 y.o. female seen for bilateral knee pain right greater than left. She has history of mitral valve prolapse, supraventricular tachycardia, asthma, breast cancer, bladder cancer. She notes a long history of knee issues. She had a history of bilateral knee patellofemoral instability right much worse than left. The left has always been treated nonsurgically. She had a Manuel osteotomy in 2010 and 2 subsequent procedures in 2010 to address patellofemoral instability in her right knee. She notes a long history of right knee pain. She had multiple injections but has not had a cortisone injection since 2017. The right knee hurts all the time it affects her quality of life. Left knee hurts too. ROS/Meds/PSH/PMH/FH/SH: A ten system review of systems was performed and is negative other than what is in the HPI. Tobacco:  reports that she quit smoking about 10 years ago. Her smoking use included cigarettes. She started smoking about 17 years ago. She has a 7.00 pack-year smoking history. She has never used smokeless tobacco.  Ht 5' 1.25\" (1.556 m)   Wt 161 lb (73 kg)   BMI 30.17 kg/m²      Physical Examination:  She is an awake alert pleasant female ambulating with an antalgic gait on the right side    The left knee has a range of motion of 0 to 135 degrees  negative Lachman,  negative anterior drawer,   negative posterior drawer  negative pivot. Good tibial step-off,   No varus or valgus laxity at 0 or 30 degrees. Negative lateral joint line tenderness   negative lateral Tawana. Negative medial joint line tenderness  negative medial Tawana. No evidence of any posterolateral instability. Moderate patellofemoral pain. Negative compression,   negative apprehension  no effusion.    Calves Are non-tender,  neurovascularly patient is intact. Negative Homans. MPFL is non-tender. Patient Can fully extend the knee. Good quad tone  No erythema. Negative Dial test.    The right knee has well-healed incisions  The right knee has a range of motion of 0 to 125 degrees  negative Lachman,  negative anterior drawer,   negative posterior drawer  negative pivot. Good tibial step-off,   No varus or valgus laxity at 0 or 30 degrees. Negative lateral joint line tenderness   Positive lateral Tawana. Positive medial joint line tenderness  Equivocal medial Tawana. No evidence of any posterolateral instability. Marked patellofemoral pain. Negative compression,   negative apprehension  no effusion. Calves Are non-tender,  neurovascularly patient is intact. Negative Homans. MPFL is non-tender. Patient Can fully extend the knee. Good quad tone  No erythema. Negative Dial test.            Data Reviewed:      XR: AP standing PA 45 degree weightbearing lateral and sunrise views both knees   Clinical Indication    ICD-10-CM    1. Right knee pain, unspecified chronicity  M25.561 XR Knee Bilateral Standard      2. Primary osteoarthritis of right knee  M17.11          Report: AP standing PA 45 degree weightbearing lateral and sunrise views both knees demonstrate degenerative changes in the medial and patellofemoral compartments with a preserved joint space. Postoperative changes of visualized in the right knee. Left knee has a preserved joint space.     Impression: As above   Fallon De Los Santos MD              Minor Procedure:    OFFICE PROCEDURE PROGRESS NOTE    Chart reviewed for the following:   Adalid Thomas MD, have reviewed the History, Physical and updated the Allergic reactions for [address] performed immediately prior to start of procedure:   Adalid Thomas MD, have performed the following reviews on Yenny Borja [facility]. prior to the start of the procedure:

## 2022-09-09 NOTE — PROGRESS NOTES
Patient verified name and . Order for consent not found in EHR and matches case posting; patient verifies procedure. Type 1a surgery, phone assessment complete. Orders not received. Labs per surgeon: none  Labs per anesthesia protocol: none    Patient answered medical/surgical history questions at their best of ability. All prior to admission medications documented in [facility]. Patient instructed to take the following medications the day of surgery according to anesthesia guidelines with a small sip of water: Albuterol, Breo, Gabapentin, Singular, Imitrex if needed On the day before surgery please take Acetaminophen 1000mg in the morning and then again before bed. You may substitute for Tylenol 650 mg. Hold all vitamins 7 days prior to surgery and NSAIDS 5 days prior to surgery. Prescription meds to hold:Diclofenac  Patient instructed on the following:    > Arrive at OP Entrance, time of arrival to be called the day before by 1700  > NPO after midnight, unless otherwise indicated, including gum, mints, and ice chips  > Responsible adult must drive patient to the hospital, stay during surgery, and patient will need supervision 24 hours after anesthesia  > Use Dial in shower the night before surgery and on the morning of surgery  > All piercings must be removed prior to arrival.    > Leave all valuables (money and jewelry) at home but bring insurance card and ID on DOS.   > You may be required to pay a deductible or co-pay on the day of your procedure. You can pre-pay by calling [phone] if your surgery is at the [facility] or [phone] if your surgery is at the [facility]. > Do not wear make-up, nail polish, lotions, cologne, perfumes, powders, or oil on skin. Artificial nails are not permitted.

## 2022-09-14 ENCOUNTER — ANESTHESIA EVENT (OUTPATIENT)
Dept: SURGERY | Age: 42
End: 2022-09-14
Payer: COMMERCIAL

## 2022-09-14 DIAGNOSIS — G56.01 RIGHT CARPAL TUNNEL SYNDROME: Primary | ICD-10-CM

## 2022-09-14 DIAGNOSIS — G56.02 LEFT CARPAL TUNNEL SYNDROME: Primary | ICD-10-CM

## 2022-09-14 PROBLEM — M65.322 TRIGGER FINGER, LEFT INDEX FINGER: Status: ACTIVE | Noted: 2022-09-14

## 2022-09-14 RX ORDER — MELOXICAM 15 MG/1
15 TABLET ORAL DAILY
Qty: 21 TABLET | Refills: 0 | Status: SHIPPED | OUTPATIENT
Start: 2022-09-14 | End: 2022-10-05

## 2022-09-14 RX ORDER — TRAMADOL HYDROCHLORIDE 50 MG/1
50 TABLET ORAL EVERY 4 HOURS PRN
Qty: 28 TABLET | Refills: 0 | Status: ON HOLD | OUTPATIENT
Start: 2022-09-14 | End: 2022-09-15 | Stop reason: HOSPADM

## 2022-09-14 NOTE — DISCHARGE INSTRUCTIONS
Postoperative  Instructions:      Weightbearing or Lifting:  Limit  weight  lifting  to  less  than  1  pound  (coffee  mug)  for  the  first  2  weeks  after  surgery. Dressing  instructions:    Keep  your  dressing  and/or  splint  clean  and  dry  at  all  times. You  can  remove  your  dressing  on  post-operative  day  #5  and  change  with  a  dry/sterile  dressing  or  Band-Aids  as  needed  thereafter. Showering  Instructions:  May  shower  But keep surgical dressing clean and dry until removed as explained above. After dressing is removed, you may allow soapy water to run through the incision during showers but do not scrub. After each shower, pat dry and apply a dry dressing. Do  not  soak  your  Incision in still water or bathtub  for  3  weeks  after  surgery. If  the  incision  gets  wet otherwise,  pat  dry  and  do  not  scrub  the  incision. Do  not  apply  cream  or  lotion  to  incision      Pain  Control:  - You  have  been  given  a  prescription  to  be  taken  as  directed  for  post-operative  pain  control. In  addition,  elevate  the  operative  extremity  above  the  heart  at  all  times  to  prevent  swelling  and  throbbing  pain. - If you develop constipation while taking narcotic pain medications (Norco, Hydrocodone, Percocet, Oxycodone, Dilaudid, Hydromorphone) take  over-the-counter  Colace,  100mg  by  mouth  twice  a  Day. - Nausea  is  a  common  side  effect  of  many  pain  medications. You  will  want  to  eat something  before  taking  your  pain  medicine  to  help  prevent  Nausea. - If  you  are  taking  a  prescription  pain  medication  that  contains  acetaminophen,  we  recommend  that  you  do  not  take  additional  over  the  counter  acetaminophen  (Tylenol®).       Other  pain  relieving  options:   - Using  a  cold  pack  to  ice  the  affected  area  a  few  times  a  day  (15  to  20  minutes  at  a  time)  can  help  to  relieve pain,  reduce  swelling  and  bruising.      - Elevation  of  the  affected  area  can  also  help  to  reduce  pain  and  swelling. Did  you  receive  a  nerve  Block? A  nerve  block  can  provide  pain  relief  for  one  hour  to  two  days  after  your  surgery. As  long  as  the  nerve  block  is  working,  you  will  experience  little  or  no  sensation  in  the  area  the  surgeon  operated  on. As  the  nerve  block  wears  off,  you  will  begin  to  experience  pain  or  discomfort. It  is  very  important  that  you  begin  taking  your  prescribed  pain  medication  before  the  nerve  block  fully  wears  off. The first sign that the nerve block is wearing off is tingling in your fingers. Treating  your  pain  at  the  first  sign  of  the  block  wearing  off  will  ensure  your  pain  is  better  controlled  and  more  tolerable  when  full-sensation  returns. Do  not  wait  until  the  pain  is  intolerable,  as  the  medicine  will  be  less  effective. It  is  better  to  treat  pain  in  advance  than  to  try  and  catch  up. General  Anesthesia or Sedation:      If  you  did  not  receive  a  nerve  block  during  your  surgery,  you  will  need  to  start  taking  your  pain  medication  shortly  after  your  surgery  and  should  continue  to  do  so  as  prescribed  by  your  surgeon. Please  contact us through my chart or call  [phone]  with any concern and ask to speak with Dr Daphney Hunt team.  Concerning problems include:      -  Excessive  redness  of  the  incisions      -  Drainage  for  more  than  2  Days after surgery or any foul smelling drainage  -  Fever  of  more  than  101.5  F      Please  call  [phone]  if  you  do  not  receive  or  are  unsure  of  your  first  follow-up  appointment. You  should  see  the  doctor  10-14  days  after  your  Surgery. Thank you for choosing me and [address] [place] [address] for your care.  I will go above and beyond to ensure you receive the best care possible. Rancho Doherty MD, PhD    MEDICATION INTERACTION:  During your procedure you potentially received a medication or medications which may reduce the effectiveness of oral contraceptives. Please consider other forms of contraception for 1 month following your procedure if you are currently using oral contraceptives as your primary form of birth control. In addition to this, we recommend continuing your oral contraceptive as prescribed, unless otherwise instructed by your physician, during this time    After general anesthesia or intravenous sedation, for 24 hours or while taking prescription Narcotics:  Limit your activities  A responsible adult needs to be with you for the next 24 hours  Do not drive and operate hazardous machinery  Do not make important personal or business decisions  Do not drink alcoholic beverages  If you have not urinated within 8 hours after discharge, and you are experiencing discomfort from urinary retention, please go to the nearest ED. If you have sleep apnea and have a CPAP machine, please use it for all naps and sleeping. Please use caution when taking narcotics and any of your home medications that may cause drowsiness. *  Please give a list of your current medications to your Primary Care Provider. *  Please update this list whenever your medications are discontinued, doses are      changed, or new medications (including over-the-counter products) are added. *  Please carry medication information at all times in case of emergency situations. These are general instructions for a healthy lifestyle:  No smoking/ No tobacco products/ Avoid exposure to second hand smoke  Surgeon General's Warning:  Quitting smoking now greatly reduces serious risk to your health.   Obesity, smoking, and sedentary lifestyle greatly increases your risk for illness  A healthy diet, regular physical exercise & weight monitoring are important for maintaining a healthy lifestyle    You may be retaining fluid if you have a history of heart failure or if you experience any of the following symptoms:  Weight gain of 3 pounds or more overnight or 5 pounds in a week, increased swelling in our hands or feet or shortness of breath while lying flat in bed. Please call your doctor as soon as you notice any of these symptoms; do not wait until your next office visit.

## 2022-09-14 NOTE — H&P
History and Physical    Subjective:     Jules Witt is a 36 y.o. scheduled for left carpal tunnel release, left thumb, index and ring trigger finger release today, there have been no changes in medical history since last seen in clinic. Past Medical History:   Diagnosis Date    Asthma     Fatty liver     Fibromyalgia     Former cigarette smoker     GERD (gastroesophageal reflux disease)     no current meds    History of bladder cancer     6 weeks of BCG    History of blood transfusion 2013    History of breast cancer     Tamoxifen for 3 years/ Right breast- lumpectomy    History of COVID-19 12/2020    pt hospitalized on BIPAP    History of gastric ulcer     Mitral valve prolapse     PONV (postoperative nausea and vomiting)     post-op. Pt does well with antiemetic    Retention of urine     X1 with cervical fusion.     Seasonal allergies     SVT (supraventricular tachycardia) ([address]     attempted ablation in 2002- last episodes of SVT- 2004    SVT (supraventricular tachycardia) ([facility])       Past Surgical History:   Procedure Laterality Date    APPENDECTOMY      ARM SURGERY Right 6/27/2022    Right cubital tunnel release on the right [facility] Right     breast cancer  lumpectomy    CARPAL TUNNEL RELEASE Right 6/27/2022    Open right carpal tunnel release performed by Aura Taylor MD at [address] Right 2010    reconstructive knee surgery on Right knee    ABIDA AND BSO (CERVIX REMOVED)      TONSILLECTOMY AND ADENOIDECTOMY       Social History     Tobacco Use    Smoking status: Former     Packs/day: 1.00     Years: 7.00     Pack years: 7.00     Types: Cigarettes     Start date: 1/1/2005     Quit date: 1/1/2012     Years since quitting: 10.7    Smokeless tobacco: Never   Substance Use Topics    Alcohol use: Yes     Comment: occassionally       Allergies   Allergen Reactions Morphine Nausea And Vomiting and Rash    Sulfa Antibiotics Rash        Review of Systems:  A comprehensive review of systems was negative except for that written in the History of Present Illness. Objective:     Physical Exam:   NAD, heart with regular rate and rhythm, lungs clear to auscultation    Extremity exam:  Examination of the left upper extremity demonstrates Decreased sensation to light touch in the median distribution, normal sensation in ulnar and radial distribution, positive carpal tunnel compression testing and Phalen testing, cap refill < 5 seconds in all fingers. Inspection reveals no thenar atrophy. Negative Tinel and elbow flexion compression test of the cubital tunnel, negative Tinel over Guyon's canal. Sensation to light touch in the ulnar 2 digits is normal with no intrinsic atrophy/weakness. No tenderness to palpation or masses noted in the forearm. positive tenderness of the left thumb, left index, left ring A1 pulley with palpable clicking and negative  locking. The extensor tendons all track well over the MCP joints. Imaging / Electrodiagnostic Tests:     Nerve conduction study from outside source was reviewed and independently interpreted, this demonstrates mild bilateral carpal tunnel syndrome with sensory latency of 3.0 on the right and motor latency of 4.7 on the right, 13% decrease in motor conduction velocity across the elbow segment on the right ulnar nerve with motor velocity of 63 compared to 72 below the elbow.   On the left side there is median sensory latency at the wrist of 2.5, median motor latency at the wrist of 4.8, decrease in motor conduction velocity at the elbow segment from 74-64    Assessment:     Hospital Problems             Last Modified POA    Left carpal tunnel syndrome 9/14/2022 Yes    Trigger finger of left thumb 9/14/2022 Yes    Trigger ring finger of left hand 9/14/2022 Yes    Trigger finger, left index finger 9/14/2022 Yes        Plan:     Plan for left carpal tunnel release, left index, ring and thumb trigger release today    Joane Boxer, MD, PhD  Orthopaedic Surgery  09/15/22  6:53 AM

## 2022-09-15 ENCOUNTER — TELEPHONE (OUTPATIENT)
Dept: ORTHOPEDIC SURGERY | Age: 42
End: 2022-09-15

## 2022-09-15 ENCOUNTER — HOSPITAL ENCOUNTER (OUTPATIENT)
Age: 42
Setting detail: OUTPATIENT SURGERY
Discharge: HOME | End: 2022-09-15
Attending: ORTHOPAEDIC SURGERY | Admitting: ORTHOPAEDIC SURGERY
Payer: COMMERCIAL

## 2022-09-15 ENCOUNTER — ANESTHESIA (OUTPATIENT)
Dept: SURGERY | Age: 42
End: 2022-09-15
Payer: COMMERCIAL

## 2022-09-15 VITALS
BODY MASS INDEX: 29.08 KG/M2 | DIASTOLIC BLOOD PRESSURE: 65 MMHG | WEIGHT: 158 LBS | OXYGEN SATURATION: 96 % | RESPIRATION RATE: 15 BRPM | SYSTOLIC BLOOD PRESSURE: 124 MMHG | TEMPERATURE: 97.5 F | HEIGHT: 62 IN | HEART RATE: 54 BPM

## 2022-09-15 DIAGNOSIS — M65.342 TRIGGER RING FINGER OF LEFT HAND: ICD-10-CM

## 2022-09-15 DIAGNOSIS — G56.02 LEFT CARPAL TUNNEL SYNDROME: ICD-10-CM

## 2022-09-15 DIAGNOSIS — M65.312 TRIGGER FINGER OF LEFT THUMB: ICD-10-CM

## 2022-09-15 DIAGNOSIS — M65.322 TRIGGER FINGER, LEFT INDEX FINGER: Primary | ICD-10-CM

## 2022-09-15 DIAGNOSIS — G43.809 OTHER MIGRAINE, NOT INTRACTABLE, WITHOUT STATUS MIGRAINOSUS: ICD-10-CM

## 2022-09-15 DIAGNOSIS — M54.2 CERVICALGIA: ICD-10-CM

## 2022-09-15 PROCEDURE — 2500000003 HC RX 250 WO HCPCS: Performed by: ORTHOPAEDIC SURGERY

## 2022-09-15 PROCEDURE — 26055 INCISE FINGER TENDON SHEATH: CPT | Performed by: ORTHOPAEDIC SURGERY

## 2022-09-15 PROCEDURE — 7100000000 HC PACU RECOVERY - FIRST 15 MIN: Performed by: ORTHOPAEDIC SURGERY

## 2022-09-15 PROCEDURE — 3600000002 HC SURGERY LEVEL 2 BASE: Performed by: ORTHOPAEDIC SURGERY

## 2022-09-15 PROCEDURE — 6360000002 HC RX W HCPCS: Performed by: NURSE PRACTITIONER

## 2022-09-15 PROCEDURE — 6360000002 HC RX W HCPCS: Performed by: NURSE ANESTHETIST, CERTIFIED REGISTERED

## 2022-09-15 PROCEDURE — 7100000010 HC PHASE II RECOVERY - FIRST 15 MIN: Performed by: ORTHOPAEDIC SURGERY

## 2022-09-15 PROCEDURE — 2709999900 HC NON-CHARGEABLE SUPPLY: Performed by: ORTHOPAEDIC SURGERY

## 2022-09-15 PROCEDURE — 6370000000 HC RX 637 (ALT 250 FOR IP): Performed by: ANESTHESIOLOGY

## 2022-09-15 PROCEDURE — 64721 CARPAL TUNNEL SURGERY: CPT | Performed by: ORTHOPAEDIC SURGERY

## 2022-09-15 PROCEDURE — 7100000001 HC PACU RECOVERY - ADDTL 15 MIN: Performed by: ORTHOPAEDIC SURGERY

## 2022-09-15 PROCEDURE — 3600000012 HC SURGERY LEVEL 2 ADDTL 15MIN: Performed by: ORTHOPAEDIC SURGERY

## 2022-09-15 PROCEDURE — 3700000000 HC ANESTHESIA ATTENDED CARE: Performed by: ORTHOPAEDIC SURGERY

## 2022-09-15 PROCEDURE — 2500000003 HC RX 250 WO HCPCS: Performed by: NURSE ANESTHETIST, CERTIFIED REGISTERED

## 2022-09-15 PROCEDURE — 2580000003 HC RX 258: Performed by: NURSE ANESTHETIST, CERTIFIED REGISTERED

## 2022-09-15 PROCEDURE — 6360000002 HC RX W HCPCS: Performed by: ANESTHESIOLOGY

## 2022-09-15 PROCEDURE — 3700000001 HC ADD 15 MINUTES (ANESTHESIA): Performed by: ORTHOPAEDIC SURGERY

## 2022-09-15 RX ORDER — SODIUM CHLORIDE 0.9 % (FLUSH) 0.9 %
5-40 SYRINGE (ML) INJECTION PRN
Status: DISCONTINUED | OUTPATIENT
Start: 2022-09-15 | End: 2022-09-15 | Stop reason: HOSPADM

## 2022-09-15 RX ORDER — SODIUM CHLORIDE 9 MG/ML
INJECTION, SOLUTION INTRAVENOUS PRN
Status: DISCONTINUED | OUTPATIENT
Start: 2022-09-15 | End: 2022-09-15 | Stop reason: HOSPADM

## 2022-09-15 RX ORDER — EPHEDRINE SULFATE/0.9% NACL/PF 50 MG/5 ML
SYRINGE (ML) INTRAVENOUS PRN
Status: DISCONTINUED | OUTPATIENT
Start: 2022-09-15 | End: 2022-09-15 | Stop reason: SDUPTHER

## 2022-09-15 RX ORDER — LIDOCAINE HYDROCHLORIDE 10 MG/ML
INJECTION, SOLUTION INFILTRATION; PERINEURAL PRN
Status: DISCONTINUED | OUTPATIENT
Start: 2022-09-15 | End: 2022-09-15 | Stop reason: ALTCHOICE

## 2022-09-15 RX ORDER — BUPIVACAINE HYDROCHLORIDE 2.5 MG/ML
INJECTION, SOLUTION EPIDURAL; INFILTRATION; INTRACAUDAL PRN
Status: DISCONTINUED | OUTPATIENT
Start: 2022-09-15 | End: 2022-09-15 | Stop reason: ALTCHOICE

## 2022-09-15 RX ORDER — SODIUM CHLORIDE 0.9 % (FLUSH) 0.9 %
5-40 SYRINGE (ML) INJECTION EVERY 12 HOURS SCHEDULED
Status: DISCONTINUED | OUTPATIENT
Start: 2022-09-15 | End: 2022-09-15 | Stop reason: HOSPADM

## 2022-09-15 RX ORDER — LIDOCAINE HYDROCHLORIDE 20 MG/ML
INJECTION, SOLUTION EPIDURAL; INFILTRATION; INTRACAUDAL; PERINEURAL PRN
Status: DISCONTINUED | OUTPATIENT
Start: 2022-09-15 | End: 2022-09-15 | Stop reason: SDUPTHER

## 2022-09-15 RX ORDER — PROPOFOL 10 MG/ML
INJECTION, EMULSION INTRAVENOUS PRN
Status: DISCONTINUED | OUTPATIENT
Start: 2022-09-15 | End: 2022-09-15 | Stop reason: SDUPTHER

## 2022-09-15 RX ORDER — ACETAMINOPHEN 500 MG
1000 TABLET ORAL ONCE
Status: COMPLETED | OUTPATIENT
Start: 2022-09-15 | End: 2022-09-15

## 2022-09-15 RX ORDER — FENTANYL CITRATE 50 UG/ML
50 INJECTION, SOLUTION INTRAMUSCULAR; INTRAVENOUS EVERY 5 MIN PRN
Status: DISCONTINUED | OUTPATIENT
Start: 2022-09-15 | End: 2022-09-15 | Stop reason: HOSPADM

## 2022-09-15 RX ORDER — MIDAZOLAM HYDROCHLORIDE 2 MG/2ML
2 INJECTION, SOLUTION INTRAMUSCULAR; INTRAVENOUS
Status: COMPLETED | OUTPATIENT
Start: 2022-09-15 | End: 2022-09-15

## 2022-09-15 RX ORDER — SODIUM CHLORIDE, SODIUM LACTATE, POTASSIUM CHLORIDE, CALCIUM CHLORIDE 600; 310; 30; 20 MG/100ML; MG/100ML; MG/100ML; MG/100ML
INJECTION, SOLUTION INTRAVENOUS CONTINUOUS PRN
Status: DISCONTINUED | OUTPATIENT
Start: 2022-09-15 | End: 2022-09-15 | Stop reason: SDUPTHER

## 2022-09-15 RX ORDER — OXYCODONE HYDROCHLORIDE 5 MG/1
5 TABLET ORAL
Status: COMPLETED | OUTPATIENT
Start: 2022-09-15 | End: 2022-09-15

## 2022-09-15 RX ORDER — LIDOCAINE HYDROCHLORIDE 10 MG/ML
1 INJECTION, SOLUTION INFILTRATION; PERINEURAL
Status: DISCONTINUED | OUTPATIENT
Start: 2022-09-15 | End: 2022-09-15 | Stop reason: HOSPADM

## 2022-09-15 RX ORDER — MIDAZOLAM HYDROCHLORIDE 1 MG/ML
INJECTION INTRAMUSCULAR; INTRAVENOUS PRN
Status: DISCONTINUED | OUTPATIENT
Start: 2022-09-15 | End: 2022-09-15 | Stop reason: SDUPTHER

## 2022-09-15 RX ORDER — HYDROMORPHONE HYDROCHLORIDE 2 MG/ML
0.5 INJECTION, SOLUTION INTRAMUSCULAR; INTRAVENOUS; SUBCUTANEOUS EVERY 10 MIN PRN
Status: DISCONTINUED | OUTPATIENT
Start: 2022-09-15 | End: 2022-09-15 | Stop reason: HOSPADM

## 2022-09-15 RX ORDER — ONDANSETRON 4 MG/1
4 TABLET, ORALLY DISINTEGRATING ORAL EVERY 8 HOURS PRN
Qty: 20 TABLET | Refills: 0 | Status: SHIPPED | OUTPATIENT
Start: 2022-09-15

## 2022-09-15 RX ORDER — AMITRIPTYLINE HYDROCHLORIDE 10 MG/1
20 TABLET, FILM COATED ORAL NIGHTLY
Qty: 180 TABLET | Refills: 3 | Status: SHIPPED | OUTPATIENT
Start: 2022-09-15

## 2022-09-15 RX ORDER — HYDROCODONE BITARTRATE AND ACETAMINOPHEN 5; 325 MG/1; MG/1
1 TABLET ORAL EVERY 6 HOURS PRN
Qty: 28 TABLET | Refills: 0 | Status: SHIPPED | OUTPATIENT
Start: 2022-09-15 | End: 2022-09-22

## 2022-09-15 RX ORDER — ONDANSETRON 2 MG/ML
4 INJECTION INTRAMUSCULAR; INTRAVENOUS
Status: DISCONTINUED | OUTPATIENT
Start: 2022-09-15 | End: 2022-09-15 | Stop reason: HOSPADM

## 2022-09-15 RX ORDER — AMITRIPTYLINE HYDROCHLORIDE 10 MG/1
20 TABLET, FILM COATED ORAL NIGHTLY
Qty: 180 TABLET | Refills: 3 | Status: SHIPPED | OUTPATIENT
Start: 2022-09-15 | End: 2022-09-15 | Stop reason: SDUPTHER

## 2022-09-15 RX ORDER — HALOPERIDOL 5 MG/ML
1 INJECTION INTRAMUSCULAR
Status: DISCONTINUED | OUTPATIENT
Start: 2022-09-15 | End: 2022-09-15 | Stop reason: HOSPADM

## 2022-09-15 RX ADMIN — SODIUM CHLORIDE, SODIUM LACTATE, POTASSIUM CHLORIDE, AND CALCIUM CHLORIDE: 600; 310; 30; 20 INJECTION, SOLUTION INTRAVENOUS at 08:12

## 2022-09-15 RX ADMIN — Medication 10 MG: at 08:25

## 2022-09-15 RX ADMIN — PROPOFOL 30 MG: 10 INJECTION, EMULSION INTRAVENOUS at 08:14

## 2022-09-15 RX ADMIN — ACETAMINOPHEN 1000 MG: 500 TABLET, FILM COATED ORAL at 07:12

## 2022-09-15 RX ADMIN — MIDAZOLAM HYDROCHLORIDE 2 MG: 1 INJECTION, SOLUTION INTRAMUSCULAR; INTRAVENOUS at 07:35

## 2022-09-15 RX ADMIN — OXYCODONE 5 MG: 5 TABLET ORAL at 09:07

## 2022-09-15 RX ADMIN — Medication 2000 MG: at 08:14

## 2022-09-15 RX ADMIN — Medication 10 MG: at 08:36

## 2022-09-15 RX ADMIN — PROPOFOL 140 MCG/KG/MIN: 10 INJECTION, EMULSION INTRAVENOUS at 08:13

## 2022-09-15 RX ADMIN — MIDAZOLAM 2 MG: 1 INJECTION INTRAMUSCULAR; INTRAVENOUS at 08:11

## 2022-09-15 RX ADMIN — HYDROMORPHONE HYDROCHLORIDE 0.5 MG: 2 INJECTION, SOLUTION INTRAMUSCULAR; INTRAVENOUS; SUBCUTANEOUS at 09:07

## 2022-09-15 RX ADMIN — LIDOCAINE HYDROCHLORIDE 40 MG: 20 INJECTION, SOLUTION EPIDURAL; INFILTRATION; INTRACAUDAL; PERINEURAL at 08:12

## 2022-09-15 RX ADMIN — HYDROMORPHONE HYDROCHLORIDE 0.5 MG: 2 INJECTION, SOLUTION INTRAMUSCULAR; INTRAVENOUS; SUBCUTANEOUS at 09:12

## 2022-09-15 ASSESSMENT — PAIN SCALES - GENERAL
PAINLEVEL_OUTOF10: 2
PAINLEVEL_OUTOF10: 9

## 2022-09-15 ASSESSMENT — PAIN DESCRIPTION - LOCATION
LOCATION: HAND
LOCATION: HAND

## 2022-09-15 ASSESSMENT — PAIN DESCRIPTION - ORIENTATION
ORIENTATION: LEFT
ORIENTATION: LEFT

## 2022-09-15 NOTE — ANESTHESIA PRE PROCEDURE
Department of Anesthesiology  Preprocedure Note       Name:  Nick Chaney   Age:  36 y.o.  :  1980                                          MRN:  [id number]         Date:  9/15/2022      Surgeon: Willie Gomez):  Bebe Matos MD    Procedure: Procedure(s):  Left CARPAL TUNNEL RELEASE  FINGER TRIGGER RELEASE on the left index, ring and thumb    Medications prior to admission:   Prior to Admission medications    Medication Sig Start Date End Date Taking? Authorizing Provider   meloxicam (MOBIC) 15 MG tablet Take 1 tablet by mouth daily for 21 days 9/14/22 10/5/22  ROSAURA Galindo CNP   traMADol (ULTRAM) 50 MG tablet Take 1 tablet by mouth every 4 hours as needed for Pain for up to 5 days. 22  ROSAURA Vargas CNP   diclofenac sodium (VOLTAREN) 1 % GEL Apply 2 g topically 2 times daily as needed for Pain 8/15/22   Gabrielle Shin MD   amitriptyline (ELAVIL) 25 MG tablet Take 1 tablet by mouth nightly for 20 days  Patient taking differently: Take 20 mg by mouth nightly 22  Bebe Matos MD   ondansetron (ZOFRAN ODT) 4 MG disintegrating tablet Take 1 tablet by mouth every 8 hours as needed for Nausea or Vomiting 22   ROSAURA Vargas CNP   diclofenac (VOLTAREN) 75 MG EC tablet Take 75 mg by mouth 2 times daily    Historical Provider, MD   acetaminophen (TYLENOL) 500 MG tablet Take 500 mg by mouth every 6 hours as needed for Pain    Historical Provider, MD   Fluticasone Furoate-Vilanterol (BREO ELLIPTA IN) Inhale into the lungs in the morning and at bedtime    Historical Provider, MD   Albuterol Sulfate, sensor, (PROAIR DIGIHALER) 108 (90 Base) MCG/ACT AEPB Inhale into the lungs as needed    Historical Provider, MD   fluticasone (FLONASE) 50 MCG/ACT nasal spray 1 spray by Nasal route in the morning and at bedtime     Ar Automatic Reconciliation   gabapentin (NEURONTIN) 300 MG capsule Take by mouth.  600mg in am and 900mg at bedtime 3/22/22   Ar Automatic Reconciliation lidocaine (LIDODERM) 5 % Place 1 patch onto the skin as needed     Ar Automatic Reconciliation   montelukast (SINGULAIR) 10 MG tablet Take 10 mg by mouth nightly  2/2/22   Ar Automatic Reconciliation   SUMAtriptan (IMITREX) 50 MG tablet One at onset of migraine and can repeat once in one hour for continued headache Do note exceed 100 mg in 24 hours 2/2/22   Ar Automatic Reconciliation   tiZANidine (ZANAFLEX) 4 MG tablet Take 4 mg by mouth at bedtime  2/2/22   Ar Automatic Reconciliation       Current medications:    Current Facility-Administered Medications   Medication Dose Route Frequency Provider Last Rate Last Admin    ceFAZolin (ANCEF) 2000 mg in sterile water 20 mL IV syringe  2,000 mg IntraVENous On Call to OR ROSAURA Falcon - CNP        sodium chloride flush 0.9 % injection 5-40 mL  5-40 mL IntraVENous 2 times per day ROSAURA Cortes - CNP        sodium chloride flush 0.9 % injection 5-40 mL  5-40 mL IntraVENous PRN ROSAURA Falcon - CNP        0.9 % sodium chloride infusion   IntraVENous PRN ROSAURA Juarez - JONI        lidocaine 1 % injection 1 mL  1 mL IntraDERmal Once PRN Sandie Anton MD        famotidine (PEPCID) 20 mg in sodium chloride (PF) 10 mL injection  20 mg IntraVENous Once PRN Sandie Anton MD        midazolam PF (VERSED) injection 2 mg  2 mg IntraVENous Once PRN Sandie Anton MD           Allergies:     Allergies   Allergen Reactions    Morphine Nausea And Vomiting and Rash    Sulfa Antibiotics Rash       Problem List:    Patient Active Problem List   Diagnosis Code    PSVT (paroxysmal supraventricular tachycardia) (HCC) I47.1    Mitral valve regurgitation I34.0    H/O cardiac radiofrequency ablation Z98.890    Shortness of breath R06.02    Malignant neoplasm of breast ([facility] C50.919    Malignant neoplasm of urinary bladder (HCC) C67.9    Focal nodular hyperplasia of liver K76.89    Cervical migraine syndrome G43.809    Neck pain M54.2    Chronic 7.00     Pack years: 7.00     Types: Cigarettes     Start date: 1/1/2005     Quit date: 1/1/2012     Years since quitting: 10.7    Smokeless tobacco: Never   Substance Use Topics    Alcohol use: Yes     Comment: occassionally                                Counseling given: Not Answered      Vital Signs (Current):   Vitals:    09/09/22 1342 09/15/22 0659   BP:  122/78   Pulse:  72   Resp:  18   Temp:  98.5 °F (36.9 °C)   TempSrc:  Oral   SpO2:  98%   Weight: 158 lb (71.7 kg) 158 lb (71.7 kg)   Height: 5' 2\" (1.575 m)                                               BP Readings from Last 3 Encounters:   09/15/22 122/78   06/27/22 128/75   02/02/22 130/71       NPO Status: Time of last liquid consumption: 2359                        Time of last solid consumption: 2359                        Date of last liquid consumption: 09/14/22                        Date of last solid food consumption: 09/14/22    BMI:   Wt Readings from Last 3 Encounters:   09/15/22 158 lb (71.7 kg)   08/15/22 161 lb (73 kg)   06/27/22 158 lb (71.7 kg)     Body mass index is 28.9 kg/m². CBC: No results found for: WBC, RBC, HGB, HCT, MCV, RDW, PLT    CMP: No results found for: NA, K, CL, CO2, BUN, CREATININE, GFRAA, AGRATIO, LABGLOM, GLUCOSE, GLU, PROT, CALCIUM, BILITOT, ALKPHOS, AST, ALT    POC Tests: No results for input(s): POCGLU, POCNA, POCK, POCCL, POCBUN, POCHEMO, POCHCT in the last 72 hours. Coags: No results found for: PROTIME, INR, APTT    HCG (If Applicable): No results found for: PREGTESTUR, PREGSERUM, HCG, HCGQUANT     ABGs: No results found for: PHART, PO2ART, BAQ5KLB, BWN4QPP, BEART, K6NPOVUE     Type & Screen (If Applicable):  No results found for: LABABO, LABRH    Drug/Infectious Status (If Applicable):  No results found for: HIV, HEPCAB    COVID-19 Screening (If Applicable): No results found for: COVID19        Anesthesia Evaluation  Patient summary reviewed   history of anesthetic complications: PONV.   Airway: Mallampati: II          Dental: normal exam         Pulmonary:Negative Pulmonary ROS breath sounds clear to auscultation  (+) asthma:                            Cardiovascular:  Exercise tolerance: good (>4 METS),   (+) dysrhythmias (s/p ablation): SVT,     (-) past MI, murmur and peripheral edema      Rhythm: regular  Rate: normal                    Neuro/Psych:   Negative Neuro/Psych ROS  (+) neuromuscular disease:,    (-) CVA           GI/Hepatic/Renal: Neg GI/Hepatic/Renal ROS  (+) GERD:,           Endo/Other: Negative Endo/Other ROS                     ROS comment: Neck Pain   Abdominal:             Vascular: negative vascular ROS. Other Findings:           Anesthesia Plan      TIVA     ASA 2       Induction: intravenous. Anesthetic plan and risks discussed with patient.                         Shruthi Nova MD   9/15/2022

## 2022-09-15 NOTE — PERIOP NOTE
Pt states tramadol will do nothing for her post op as pt has been taking for years for neck pain. Herlinda Ceballos MD called, pt requests Sridhar posada, Herlinda Ceballos states he will escribed. Pt updated.

## 2022-09-16 NOTE — OP NOTE
Hand Surgery Operative Note      Laura Lara   36 y.o.   female   9/15/2022     Pre-op diagnosis: Left  Left carpal tunnel syndrome, left index, thumb and ring trigger Finger   Post op diagnosis: same      Procedure: Left  Left carpal tunnel release, left thumb, index and ring trigger Finger Release     Surgeon: Alfredo Stokes MD, PhD      Anesthesia: Local + MAC     Tourniquet time:   Total Tourniquet Time Documented:  Arm  (Left) - 26 minutes  Total: Arm  (Left) - 26 minutes        Procedure indications: Patient with catching and locking of the above mentioned finger(s) which have been recalcitrant to nonoperative measures, persistent numbness and paresthesias in median distribution. After Thorough discussion, the patient decided to proceed with surgical management. We discussed in detail surgical risks including scar, pain, bleeding, infection, anesthetic risks, neurovascular injury, need for further surgery,  weakness, stiffness, risk of death and potential risk of other unforseen complication. Procedure description:      Patient was placed in the supine position and after appropriate time-out and side, site and procedure confirmed. Local anesthesia was infiltrated with Lidocaine 2% plain and 0.25% Bupivacaine plain. The carpal tunnel incision was made in the palm in line with the radial border of the ring finger under loupe magnification and palmar fascia was incised longitudinally. Blunt retraction used to identify the transverse carpal ligament, which was incised, visualizing the median nerve beneath, which was protected with a slotted freer. The remaining ligament was released with a [place] meniscal blade under direct visualization. The ligament was released in its entirety, and visualized at its most proximal and distal extent. Attention was brought to the thumb. A transverse incision was made along the thumb MP crease under loupe magnification.  Blunt dissection was used to identify the A1 pulley as well as the neurovascular bundle on each side of the flexor tendon. Blunt retraction was used to protect the neurovascular bundles. Sharp incision was made on top of the A1 pulley and scissor dissection was used to extend the sheath incision proximally to release the palmar pulley and distally until the oblique pulley was encountered. Attention was brought to the index and ring finger, the procedure he was performed in the same fashion so it is dictated together. A longitudinal incision was made along the left index and ring finger A1 pulley under loupe magnification. Blunt dissection was used to identify the A1 pulley as well as the neurovascular bundle on each side of the flexor tendon. Blunt retraction was used to protect the neurovascular bundles. Sharp incision was made on top of the A1 pulley and scissor dissection was used to extend the sheath incision proximally to release the palmar pulley and distally until the A2 pulley was encountered. The flexor tendons were then mobilized and not catching or clicking was identified. Wounds were irrigated and hemostasis was obtained with bipolar cautery. Wound then closed with 4-0 prolene and sterile dressing applied. Disposition: To PACU with no complications and follow up per routine. Patient is instructed to remove dressings in five days and other precautions include avoidance of heavy and repetitive lifting for 2 weeks, when an appointment for follow up and suture removal will take place.      Sarah Haque MD  09/16/22  7:16 AM

## 2022-09-27 ENCOUNTER — OFFICE VISIT (OUTPATIENT)
Dept: ORTHOPEDIC SURGERY | Age: 42
End: 2022-09-27

## 2022-09-27 DIAGNOSIS — M65.342 TRIGGER RING FINGER OF LEFT HAND: ICD-10-CM

## 2022-09-27 DIAGNOSIS — M65.322 TRIGGER INDEX FINGER OF LEFT HAND: ICD-10-CM

## 2022-09-27 DIAGNOSIS — G56.02 LEFT CARPAL TUNNEL SYNDROME: Primary | ICD-10-CM

## 2022-09-27 DIAGNOSIS — M65.312 TRIGGER FINGER OF LEFT THUMB: ICD-10-CM

## 2022-09-27 DIAGNOSIS — G56.01 RIGHT CARPAL TUNNEL SYNDROME: ICD-10-CM

## 2022-09-27 PROCEDURE — 99024 POSTOP FOLLOW-UP VISIT: CPT | Performed by: NURSE PRACTITIONER

## 2022-09-27 RX ORDER — METHYLPREDNISOLONE 4 MG/1
TABLET ORAL
Qty: 1 KIT | Refills: 0 | Status: SHIPPED | OUTPATIENT
Start: 2022-09-27

## 2022-09-27 RX ORDER — MELOXICAM 15 MG/1
15 TABLET ORAL DAILY
Qty: 21 TABLET | Refills: 0 | Status: SHIPPED | OUTPATIENT
Start: 2022-09-27 | End: 2022-10-18

## 2022-09-27 RX ORDER — HYDROCODONE BITARTRATE AND ACETAMINOPHEN 5; 325 MG/1; MG/1
1 TABLET ORAL EVERY 6 HOURS PRN
Qty: 20 TABLET | Refills: 0 | Status: SHIPPED | OUTPATIENT
Start: 2022-09-27 | End: 2022-10-02

## 2022-09-27 NOTE — PROGRESS NOTES
Orthopaedic Hand Surgery Note    Name: Ester Bower  YOB: 1980  Gender: female  MRN: [id number]    HPI: Patient is status post Left CARPAL TUNNEL RELEASE - Left and FINGER TRIGGER RELEASE on the left index, ring and thumb - Left on 9/15/2022. Patient reports sensation is improved. Night symptoms have resolved. No fevers or chills. Patient denies locking or clicking of her trigger fingers. She says the numbness and tingling have resolved. She said she feels stiff and swollen. She has a $50 co-pay for every therapy visit. Physical Examination:  Wound healing well.'s are intact with no erythema or drainage. Her motion is decreased secondary to pain and swelling. He is unable to make a composite fist.  Sensation is improved from preoperative. Assessment:   1. Left carpal tunnel syndrome    2. Trigger index finger of left hand    3. Trigger ring finger of left hand    4. Trigger finger of left thumb        Status post Left CARPAL TUNNEL RELEASE - Left and FINGER TRIGGER RELEASE on the left index, ring and thumb - Left on 9/15/2022    Plan:  Remove her sutures. I will give her a Medrol Dosepak. Refill her Mobic. We will also refill her pain medication. I will give her a work note. We discussed that the hand can get wet uncovered in the shower but no soaking the hand for 2 more weeks. After washing the hand the incision needs to be patted dry and covered. Lifting will be kept at 2 lbs and under with avoidance of undue stress on the hand for 2 weeks. Patient will see me in 2 weeks for reevaluation.     Jeb Smith NP  Orthopaedic Surgery  09/27/22  9:24 AM

## 2022-09-27 NOTE — LETTER
[address] [facility]  [address] [facility]. [place] [address]  Phone: [phone]  Fax: [phone]    ROSAURA Chatterjee CNP        September 27, 2022     Patient: Elena Reyna   YOB: 1980   Date of Visit: 9/27/2022       To Whom It May Concern: It is my medical opinion that Carmelita Beth should remain out of work until her follow up appointment in 2 weeks. .    If you have any questions or concerns, please don't hesitate to call.     Sincerely,        ROSAURA Chatterjee CNP

## 2022-09-29 DIAGNOSIS — M25.561 RIGHT KNEE PAIN, UNSPECIFIED CHRONICITY: ICD-10-CM

## 2022-09-30 ENCOUNTER — TELEPHONE (OUTPATIENT)
Dept: ORTHOPEDIC SURGERY | Age: 42
End: 2022-09-30

## 2022-10-05 ENCOUNTER — TELEPHONE (OUTPATIENT)
Dept: ORTHOPEDIC SURGERY | Age: 42
End: 2022-10-05

## 2022-10-11 ENCOUNTER — OFFICE VISIT (OUTPATIENT)
Dept: ORTHOPEDIC SURGERY | Age: 42
End: 2022-10-11

## 2022-10-11 DIAGNOSIS — G56.02 LEFT CARPAL TUNNEL SYNDROME: ICD-10-CM

## 2022-10-11 DIAGNOSIS — M65.322 TRIGGER INDEX FINGER OF LEFT HAND: Primary | ICD-10-CM

## 2022-10-11 DIAGNOSIS — M65.342 TRIGGER RING FINGER OF LEFT HAND: ICD-10-CM

## 2022-10-11 DIAGNOSIS — M65.312 TRIGGER FINGER OF LEFT THUMB: ICD-10-CM

## 2022-10-11 PROCEDURE — 99024 POSTOP FOLLOW-UP VISIT: CPT | Performed by: NURSE PRACTITIONER

## 2022-10-11 RX ORDER — TRAMADOL HYDROCHLORIDE 50 MG/1
50 TABLET ORAL EVERY 8 HOURS PRN
Qty: 15 TABLET | Refills: 0 | Status: SHIPPED | OUTPATIENT
Start: 2022-10-11 | End: 2022-10-16

## 2022-10-11 RX ORDER — ASCORBIC ACID 500 MG
500 TABLET ORAL DAILY
Qty: 30 TABLET | Refills: 3 | Status: SHIPPED | OUTPATIENT
Start: 2022-10-11

## 2022-10-11 RX ORDER — ERGOCALCIFEROL 1.25 MG/1
50000 CAPSULE ORAL WEEKLY
Qty: 4 CAPSULE | Refills: 0 | Status: SHIPPED | OUTPATIENT
Start: 2022-10-11 | End: 2022-11-02

## 2022-10-11 RX ORDER — MELOXICAM 15 MG/1
15 TABLET ORAL DAILY
Qty: 28 TABLET | Refills: 0 | Status: SHIPPED | OUTPATIENT
Start: 2022-10-11 | End: 2022-11-08

## 2022-10-11 NOTE — LETTER
[address] [facility]. [place] [address]  Phone: [phone]  Fax: [phone]    ROSAURA Lin CNP        October 11, 2022     Patient: Janna Beltran   YOB: 1980   Date of Visit: 10/11/2022       To Whom It May Concern: It is my medical opinion that Ailin Porter may return to work with the following restrictions: Patient may work 6-8 hours daily 4 days per week until follow up appointment in 4 weeks. If you have any questions or concerns, please don't hesitate to call.     Sincerely,        ROSAURA Lin CNP

## 2022-10-11 NOTE — PROGRESS NOTES
Orthopaedic Hand Clinic Note    Name: Mikie Tyler  YOB: 1980  Gender: female  MRN: [id number]      Follow up visit:   1. Trigger index finger of left hand    2. Trigger ring finger of left hand    3. Trigger finger of left thumb    4. Left carpal tunnel syndrome        HPI: Mikie Tyler is a 36 y.o. female who is following up for left carpal tunnel release, left thumb, index, ring trigger finger release. She went back to work yesterday. She had quite a bit of pain. She is complaining of numbness and tingling and pain. She is on Mobic. She does need a refill. She takes gabapentin and amitriptyline at night. She is 4 weeks out from surgery. She has been doing therapy on her own due to her high co-pays from insurance. ROS/Meds/PSH/PMH/FH/SH: I personally reviewed the patients standard intake form. Pertinents are discussed in the HPI    Physical Examination:    Musculoskeletal Examination:  Examination on the left upper extremity demonstrates cap refill < 5 seconds in all fingers  Patient has well-healed incisions to the palmar aspect of the left hand. She is able to make a composite fist.  She still has swelling particularly at the index trigger finger incision. She complains of numbness and tingling. She has good capillary refill. Imaging / Electrodiagnostic Tests:     None    Assessment:     ICD-10-CM    1. Trigger index finger of left hand  M65.322       2. Trigger ring finger of left hand  M65.342       3. Trigger finger of left thumb  M65.312       4. Left carpal tunnel syndrome  G56.02           Plan:   We discussed the diagnosis and different treatment options. We discussed observation, therapy, antiinflammatory medications and other pertinent treatment modalities. After discussing in detail the patient elects to proceed with we will refill her Mobic and her Ultram.  I will also add vitamin D and vitamin C. She is on amitriptyline baseline.   She will continue home therapy. We will give her a work note that she can work 6 to 8 hours/day 4 days/week. We will see her back in 4 weeks to reassess her progress. .     Patient voiced accordance and understanding of the information provided and the formulated plan. All questions were answered to the patient's satisfaction during the encounter.     Treatment at this time: Prescription medication and home exercises    ROSAURA Muhammad CNP  Orthopaedic Surgery  10/11/22  12:15 PM

## 2022-10-17 ENCOUNTER — TELEPHONE (OUTPATIENT)
Dept: ORTHOPEDIC SURGERY | Age: 42
End: 2022-10-17

## 2022-11-10 RX ORDER — ERGOCALCIFEROL 1.25 MG/1
50000 CAPSULE ORAL WEEKLY
Qty: 4 CAPSULE | Refills: 0 | OUTPATIENT
Start: 2022-11-10 | End: 2022-12-02

## 2022-11-15 RX ORDER — MELOXICAM 15 MG/1
TABLET ORAL
Qty: 28 TABLET | Refills: 0 | OUTPATIENT
Start: 2022-11-15

## 2022-11-16 ENCOUNTER — OFFICE VISIT (OUTPATIENT)
Dept: ORTHOPEDIC SURGERY | Age: 42
End: 2022-11-16

## 2022-11-16 DIAGNOSIS — G56.02 LEFT CARPAL TUNNEL SYNDROME: ICD-10-CM

## 2022-11-16 DIAGNOSIS — M65.312 TRIGGER FINGER OF LEFT THUMB: ICD-10-CM

## 2022-11-16 DIAGNOSIS — M65.322 TRIGGER INDEX FINGER OF LEFT HAND: Primary | ICD-10-CM

## 2022-11-16 DIAGNOSIS — M65.342 TRIGGER RING FINGER OF LEFT HAND: ICD-10-CM

## 2022-11-16 PROCEDURE — 99024 POSTOP FOLLOW-UP VISIT: CPT | Performed by: NURSE PRACTITIONER

## 2022-11-16 RX ORDER — DICLOFENAC SODIUM 30 MG/G
2 GEL TOPICAL 3 TIMES DAILY PRN
Qty: 100 G | Refills: 0 | Status: SHIPPED | OUTPATIENT
Start: 2022-11-16 | End: 2022-12-14

## 2022-11-16 RX ORDER — ASPIRIN 81 MG
TABLET,CHEWABLE ORAL
Qty: 42.5 G | Refills: 1 | Status: SHIPPED | OUTPATIENT
Start: 2022-11-16

## 2022-11-16 RX ORDER — ASPIRIN 81 MG
TABLET,CHEWABLE ORAL
Qty: 42.5 G | Refills: 1 | Status: SHIPPED | OUTPATIENT
Start: 2022-11-16 | End: 2022-11-16

## 2022-11-16 RX ORDER — DICLOFENAC SODIUM 30 MG/G
2 GEL TOPICAL 3 TIMES DAILY PRN
Qty: 100 G | Refills: 0 | Status: SHIPPED | OUTPATIENT
Start: 2022-11-16 | End: 2022-11-16

## 2022-11-16 NOTE — PROGRESS NOTES
Orthopaedic Hand Clinic Note    Name: oHward Bowling  YOB: 1980  Gender: female  MRN: [id number]      Follow up visit:   1. Trigger index finger of left hand    2. Trigger ring finger of left hand    3. Trigger finger of left thumb    4. Left carpal tunnel syndrome        HPI: Howard Bowling is a 36 y.o. female who is following up for left carpal tunnel release, left thumb trigger finger release, left ring trigger finger release, left index trigger finger release. She is 2 months out from surgery. She is still having a fair amount of discomfort and swelling. She has been working a limited schedule. She has been on steroids and anti-inflammatories. She has been doing home exercises due to her high copayments for therapy. ROS/Meds/PSH/PMH/FH/SH: I personally reviewed the patients standard intake form. Pertinents are discussed in the HPI    Physical Examination:    Musculoskeletal Examination:  Examination on the left upper extremity demonstrates cap refill < 5 seconds in all fingers  Patient has swelling to the left hand. She has 4 well-healed incisions to the left hand. She is able to make a composite fist.  She does have full extension of her digits. She is quite sensitive to touch over all incisions. She has swelling mostly under the A1 pulley of the left index finger. She describes shooting sensations to the index and middle finger. Her numbness has improved since surgery. Her capillary refill is within normal limits. Imaging / Electrodiagnostic Tests:     None    Assessment:     ICD-10-CM    1. Trigger index finger of left hand  M65.322       2. Trigger ring finger of left hand  M65.342       3. Trigger finger of left thumb  M65.312       4. Left carpal tunnel syndrome  G56.02           Plan:   We discussed the diagnosis and different treatment options. We discussed observation, therapy, antiinflammatory medications and other pertinent treatment modalities.     After discussing in detail the patient elects to proceed with formal therapy once a week or once every other week. We will also prescribe Voltaren gel and capsaicin cream.  We will restrict her work hours to 6 to 8 hours/day, 4 days/week. We will see her back in 4 weeks to reassess her progress. She will work on home exercises at home including deep massage and scar desensitization. Patient voiced accordance and understanding of the information provided and the formulated plan. All questions were answered to the patient's satisfaction during the encounter.     Treatment at this time: Prescription medication and therapy and work restrictions    ROSAURA Muhammad - [place]  Orthopaedic Surgery  11/16/22  9:30 AM

## 2022-11-16 NOTE — Clinical Note
[id number] [facility]. [facility] [address] [address]  Phone: [phone]  Fax: [phone]    ROSAURA Garvey CNP        November 16, 2022     Patient: Pedro Trinidad   YOB: 1980   Date of Visit: 11/16/2022       To Whom It May Concern: It is my medical opinion that Pari Hedrick {Work release (duty restriction):99991}. If you have any questions or concerns, please don't hesitate to call.     Sincerely,        ROSAURA Garvey CNP

## 2022-11-16 NOTE — LETTER
[address] [facility]. [place] [address]  Phone: [phone]  Fax: [phone]    ROSAURA Juarez CNP    November 16, 2022     Judith Brooks MD  [address] [place] [address]  [address]    Patient: Enrike Kendall   MR Number: [id number]   YOB: 1980   Date of Visit: 11/16/2022       Dear Judith Brooks:    Thank you for referring Cassi Goodman to me for evaluation/treatment. Below are the relevant portions of my assessment and plan of care. If you have questions, please do not hesitate to call me. I look forward to following Radha Cardenas along with you.     Sincerely,      ROSAURA Juarez CNP

## 2022-11-16 NOTE — LETTER
[address] [facility]  [address] [place] [address]  Phone: [phone]  Fax: [phone]    ROSAURA Diaz CNP        November 16, 2022     Patient: Melanie Foley   YOB: 1980   Date of Visit: 11/16/2022       To Whom It May Concern: It is my medical opinion that Vince Thornton may return to work on 11/16/22 -patient may only work 6 to 8 hours/day 3 days/week until recheck appointment in 4 weeks. .    If you have any questions or concerns, please don't hesitate to call.     Sincerely,        ROSAURA Diaz CNP

## 2022-11-28 ENCOUNTER — TELEPHONE (OUTPATIENT)
Dept: ORTHOPEDIC SURGERY | Age: 42
End: 2022-11-28

## 2022-12-05 ENCOUNTER — TELEPHONE (OUTPATIENT)
Dept: NEUROLOGY | Age: 42
End: 2022-12-05

## 2022-12-05 NOTE — TELEPHONE ENCOUNTER
Patient's insurance denied Botox because she hasn't tried a CGRP. Do you want to try one or schedule her for a follow up?

## 2022-12-21 ENCOUNTER — OFFICE VISIT (OUTPATIENT)
Dept: ORTHOPEDIC SURGERY | Age: 42
End: 2022-12-21

## 2022-12-21 DIAGNOSIS — M65.322 TRIGGER INDEX FINGER OF LEFT HAND: ICD-10-CM

## 2022-12-21 DIAGNOSIS — G56.02 LEFT CARPAL TUNNEL SYNDROME: Primary | ICD-10-CM

## 2022-12-21 DIAGNOSIS — M65.9 TENOSYNOVITIS: ICD-10-CM

## 2022-12-21 DIAGNOSIS — M19.90 INFLAMMATORY ARTHROPATHY: ICD-10-CM

## 2022-12-21 DIAGNOSIS — M65.342 TRIGGER RING FINGER OF LEFT HAND: ICD-10-CM

## 2022-12-21 DIAGNOSIS — M65.312 TRIGGER FINGER OF LEFT THUMB: ICD-10-CM

## 2022-12-21 RX ORDER — BETAMETHASONE SODIUM PHOSPHATE AND BETAMETHASONE ACETATE 3; 3 MG/ML; MG/ML
6 INJECTION, SUSPENSION INTRA-ARTICULAR; INTRALESIONAL; INTRAMUSCULAR; SOFT TISSUE ONCE
Status: SHIPPED | OUTPATIENT
Start: 2022-12-21

## 2022-12-21 RX ORDER — MELOXICAM 15 MG/1
15 TABLET ORAL DAILY
Qty: 30 TABLET | Refills: 1 | Status: SHIPPED | OUTPATIENT
Start: 2022-12-21

## 2022-12-21 RX ORDER — BETAMETHASONE SODIUM PHOSPHATE AND BETAMETHASONE ACETATE 3; 3 MG/ML; MG/ML
6 INJECTION, SUSPENSION INTRA-ARTICULAR; INTRALESIONAL; INTRAMUSCULAR; SOFT TISSUE ONCE
Status: COMPLETED | OUTPATIENT
Start: 2022-12-21 | End: 2022-12-21

## 2022-12-21 RX ADMIN — BETAMETHASONE SODIUM PHOSPHATE AND BETAMETHASONE ACETATE 6 MG: 3; 3 INJECTION, SUSPENSION INTRA-ARTICULAR; INTRALESIONAL; INTRAMUSCULAR; SOFT TISSUE at 15:04

## 2022-12-21 NOTE — PROGRESS NOTES
Orthopaedic Hand Surgery Note    Name: Sadie Kirkpatrick  Age: 36 y.o. YOB: 1980  Gender: female  MRN: [id number]    Post Operative Visit: Left CARPAL TUNNEL RELEASE - Left and FINGER TRIGGER RELEASE on the left index, ring and thumb - Left    HPI: Patient is status post Left CARPAL TUNNEL RELEASE - Left and FINGER TRIGGER RELEASE on the left index, ring and thumb - Left on 9/15/2022. Patient reports ongoing pain in the left hand, she did very well with right carpal tunnel release however, on the left hand she has had ongoing pain, she feels like stretching the fingers causes significant pain down into the wrist, she has significant scar tenderness, she has been unable to resume all activities despite the fact that her surgery was over 3 months ago. Physical Examination:  Well-healed surgical wounds. Sensation is intact in all fingers. Motor exam reveals no deficits. Severe tenderness palpation of the left index and ring finger surgical scars, hyperextension of all the fingers causes severe pain radiating down into the wrist, there is tenderness palpation of all the flexor tendons proximal to the carpal canal.    Imaging:     none    Assessment:   1. Left carpal tunnel syndrome    2. Inflammatory arthropathy    3. Tenosynovitis    4. Trigger index finger of left hand    5. Trigger ring finger of left hand    6. Trigger finger of left thumb         Status post Left CARPAL TUNNEL RELEASE - Left and FINGER TRIGGER RELEASE on the left index, ring and thumb - Left on 9/15/2022    Plan:  We discussed the post operative course and progression. Left carpal tunnel steroid injection was performed today as a diagnostic tool, examination 10 minutes after the injection demonstrated significant provement in motion and less pain with stretching of the fingers however she still had some discomfort.   I am concerned that she could have an inflammatory disease causing profuse flexor tenosynovitis, we will obtain a rheumatoid panel to rule out inflammatory diseases, she does not have any allodynia or hyperpathia but CRPS could be in the differential, if all inflammatory work-up is negative we may refer the patient to a pain management clinic for stellate ganglion block as a diagnostic tool. Procedure Note    The risk, benefits and alternatives of injection and no injection therapy were discussed. The patient consented for an injection. The patient has been identified by name and birthdate. The injection site was identified, marked and prepped with alcohol swabs. Time out completed. The Left carpal tunnel was injected with 1ml of 6mg/ml Betamethasone and 1ml Lidocaine plain 1%. The injection site was then dressed with a bandaid. The patient tolerated the injection well. The patient was instructed to monitor their blood sugars if diabetic and call if any concerns. The patient was instructed to call the office if any adverse local effects occurred or any if any questions or concerns arise.          Jake Salvador MD  12/21/22  3:40 PM

## 2022-12-21 NOTE — LETTER
[address] [address]  Phone: [phone]  Fax: [phone]    Lorre Brittle, MD        December 21, 2022     Patient: Lillie Benitez   YOB: 1980   Date of Visit: 12/21/2022       To Whom It May Concern: It is my medical opinion that Rambo Wilson It is my medical opinion that Rambo Wilson may return to work with the following restrictions: 8 hour work days working 4 days per week until follow up in 6 weeks. If you have any questions or concerns, please don't hesitate to call.     Sincerely,        Lorre Brittle, MD

## 2023-01-10 ENCOUNTER — TELEPHONE (OUTPATIENT)
Dept: ORTHOPEDIC SURGERY | Age: 43
End: 2023-01-10

## 2023-01-10 NOTE — TELEPHONE ENCOUNTER
Pt called I explained to her cdv. Said he had nothing to offer,she said she wants a office vii.so he can see her in person. she was upset. thanks this is her neck had sx moved   [place] a pain [place]

## 2023-01-12 ENCOUNTER — OFFICE VISIT (OUTPATIENT)
Dept: NEUROLOGY | Age: 43
End: 2023-01-12
Payer: COMMERCIAL

## 2023-01-12 VITALS
WEIGHT: 169.2 LBS | DIASTOLIC BLOOD PRESSURE: 90 MMHG | BODY MASS INDEX: 30.95 KG/M2 | OXYGEN SATURATION: 97 % | HEART RATE: 105 BPM | SYSTOLIC BLOOD PRESSURE: 130 MMHG

## 2023-01-12 DIAGNOSIS — G43.809 CERVICAL MIGRAINE SYNDROME: ICD-10-CM

## 2023-01-12 DIAGNOSIS — M54.81 CERVICO-OCCIPITAL NEURALGIA OF LEFT SIDE: ICD-10-CM

## 2023-01-12 DIAGNOSIS — G43.719 INTRACTABLE CHRONIC MIGRAINE WITHOUT AURA AND WITHOUT STATUS MIGRAINOSUS: Primary | ICD-10-CM

## 2023-01-12 PROCEDURE — 99215 OFFICE O/P EST HI 40 MIN: CPT | Performed by: PSYCHIATRY & NEUROLOGY

## 2023-01-12 RX ORDER — BUDESONIDE AND FORMOTEROL FUMARATE DIHYDRATE 160; 4.5 UG/1; UG/1
2 AEROSOL RESPIRATORY (INHALATION) 2 TIMES DAILY
COMMUNITY
Start: 2022-11-02

## 2023-01-12 RX ORDER — GABAPENTIN 300 MG/1
CAPSULE ORAL
Qty: 210 CAPSULE | Refills: 5 | Status: SHIPPED | OUTPATIENT
Start: 2023-01-12 | End: 2023-07-12

## 2023-01-12 ASSESSMENT — ENCOUNTER SYMPTOMS
BLURRED VISION: 1
BACK PAIN: 1
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
## 2023-01-14 ASSESSMENT — VISUAL ACUITY: VA_NORMAL: 1

## 2023-01-16 RX ORDER — ASCORBIC ACID 500 MG
TABLET ORAL
Qty: 90 TABLET | Refills: 1 | OUTPATIENT
Start: 2023-01-16

## 2023-01-28 ENCOUNTER — APPOINTMENT (OUTPATIENT)
Dept: GENERAL RADIOLOGY | Age: 43
End: 2023-01-28
Payer: COMMERCIAL

## 2023-01-28 ENCOUNTER — HOSPITAL ENCOUNTER (EMERGENCY)
Age: 43
Discharge: HOME | End: 2023-01-28
Attending: EMERGENCY MEDICINE
Payer: COMMERCIAL

## 2023-01-28 VITALS
RESPIRATION RATE: 21 BRPM | BODY MASS INDEX: 30.02 KG/M2 | DIASTOLIC BLOOD PRESSURE: 85 MMHG | TEMPERATURE: 99.5 F | HEIGHT: 61 IN | HEART RATE: 75 BPM | SYSTOLIC BLOOD PRESSURE: 117 MMHG | WEIGHT: 159 LBS | OXYGEN SATURATION: 100 %
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
## 2023-01-28 DIAGNOSIS — S70.01XA CONTUSION OF RIGHT HIP, INITIAL ENCOUNTER: Primary | ICD-10-CM

## 2023-01-28 PROCEDURE — 96375 TX/PRO/DX INJ NEW DRUG ADDON: CPT

## 2023-01-28 PROCEDURE — 73502 X-RAY EXAM HIP UNI 2-3 VIEWS: CPT

## 2023-01-28 PROCEDURE — 6360000002 HC RX W HCPCS: Performed by: EMERGENCY MEDICINE

## 2023-01-28 PROCEDURE — 99284 EMERGENCY DEPT VISIT MOD MDM: CPT

## 2023-01-28 PROCEDURE — 96376 TX/PRO/DX INJ SAME DRUG ADON: CPT

## 2023-01-28 PROCEDURE — 96374 THER/PROPH/DIAG INJ IV PUSH: CPT

## 2023-01-28 RX ORDER — OXYCODONE HYDROCHLORIDE AND ACETAMINOPHEN 5; 325 MG/1; MG/1
1 TABLET ORAL EVERY 6 HOURS PRN
Qty: 20 TABLET | Refills: 0 | Status: SHIPPED | OUTPATIENT
Start: 2023-01-28 | End: 2023-02-02

## 2023-01-28 RX ORDER — ONDANSETRON 2 MG/ML
4 INJECTION INTRAMUSCULAR; INTRAVENOUS
Status: COMPLETED | OUTPATIENT
Start: 2023-01-28 | End: 2023-01-28

## 2023-01-28 RX ORDER — METHOCARBAMOL 750 MG/1
750 TABLET, FILM COATED ORAL 4 TIMES DAILY PRN
Qty: 28 TABLET | Refills: 0 | Status: SHIPPED | OUTPATIENT
Start: 2023-01-28

## 2023-01-28 RX ORDER — HYDROMORPHONE HYDROCHLORIDE 1 MG/ML
0.5 INJECTION, SOLUTION INTRAMUSCULAR; INTRAVENOUS; SUBCUTANEOUS
Status: COMPLETED | OUTPATIENT
Start: 2023-01-28 | End: 2023-01-28

## 2023-01-28 RX ORDER — KETOROLAC TROMETHAMINE 15 MG/ML
15 INJECTION, SOLUTION INTRAMUSCULAR; INTRAVENOUS
Status: COMPLETED | OUTPATIENT
Start: 2023-01-28 | End: 2023-01-28

## 2023-01-28 RX ADMIN — HYDROMORPHONE HYDROCHLORIDE 0.5 MG: 1 INJECTION, SOLUTION INTRAMUSCULAR; INTRAVENOUS; SUBCUTANEOUS at 10:04

## 2023-01-28 RX ADMIN — ONDANSETRON 4 MG: 2 INJECTION INTRAMUSCULAR; INTRAVENOUS at 10:06

## 2023-01-28 RX ADMIN — KETOROLAC TROMETHAMINE 15 MG: 15 INJECTION, SOLUTION INTRAMUSCULAR; INTRAVENOUS at 11:07

## 2023-01-28 RX ADMIN — HYDROMORPHONE HYDROCHLORIDE 0.5 MG: 1 INJECTION, SOLUTION INTRAMUSCULAR; INTRAVENOUS; SUBCUTANEOUS at 11:15

## 2023-01-28 ASSESSMENT — PAIN SCALES - GENERAL
PAINLEVEL_OUTOF10: 8
PAINLEVEL_OUTOF10: 10
PAINLEVEL_OUTOF10: 10

## 2023-01-28 ASSESSMENT — PAIN DESCRIPTION - PAIN TYPE: TYPE: ACUTE PAIN

## 2023-01-28 ASSESSMENT — PAIN DESCRIPTION - DESCRIPTORS: DESCRIPTORS: THROBBING

## 2023-01-28 ASSESSMENT — PAIN DESCRIPTION - LOCATION
LOCATION: HIP
LOCATION: HIP
LOCATION: LEG;HIP
LOCATION: HIP

## 2023-01-28 ASSESSMENT — PAIN DESCRIPTION - ORIENTATION
ORIENTATION: RIGHT

## 2023-01-28 ASSESSMENT — PAIN - FUNCTIONAL ASSESSMENT
PAIN_FUNCTIONAL_ASSESSMENT: 0-10
PAIN_FUNCTIONAL_ASSESSMENT: 0-10
PAIN_FUNCTIONAL_ASSESSMENT: PREVENTS OR INTERFERES SOME ACTIVE ACTIVITIES AND ADLS

## 2023-01-28 ASSESSMENT — ENCOUNTER SYMPTOMS: BACK PAIN: 0

## 2023-01-28 ASSESSMENT — PAIN DESCRIPTION - ONSET: ONSET: AWAKENED FROM SLEEP

## 2023-01-28 ASSESSMENT — PAIN DESCRIPTION - FREQUENCY: FREQUENCY: CONTINUOUS

## 2023-01-28 NOTE — ED NOTES
I have reviewed discharge instructions with the patient. The spouse verbalized understanding. Patient left ED via Discharge Method: ambulatory to Home with Spouse. Opportunity for questions and clarification provided. Patient given 2 scripts. To continue your aftercare when you leave the hospital, you may receive an automated call from our care team to check in on how you are doing. This is a free service and part of our promise to provide the best care and service to meet your aftercare needs.  If you have questions, or wish to unsubscribe from this service please call [phone]. Thank you for Choosing our [facility] Emergency Department.        Alison Watts RN  01/28/23 1332

## 2023-01-28 NOTE — ED TRIAGE NOTES
Patient presents to ED after fall yesterday. She landed on her right hip. No LOC, no other injuries. Hurts to sit,, throbs on standing.

## 2023-01-28 NOTE — ED PROVIDER NOTES
Emergency Department Provider Note                   PCP:                Sav Nava MD               Age: 36 y.o. Sex: female       ICD-10-CM    1. Contusion of right hip, initial encounter  S70.01XA oxyCODONE-acetaminophen (ENDOCET) 5-325 MG per tablet      2. Fall, initial encounter  [address]. Jack Bartholomew           DISPOSITION Decision To Discharge 01/28/2023 10:42:54 AM        Medical Decision Making  Amount and/or Complexity of Data Reviewed  Radiology: ordered. Risk  Prescription drug management. Complexity of Problem: 1 stable, acute illness. (3)    I have conducted an independent ordering and review of X-rays. I have reviewed records from an external source: provider visit notes from PCP. I have reviewed records from an external source: provider visit notes from outside specialist.                   Orders Placed This Encounter   Procedures    XR HIP 2-3 VW W PELVIS RIGHT    Insert peripheral IV        Medications   HYDROmorphone HCl PF (DILAUDID) injection 0.5 mg (has no administration in time range)   ketorolac (TORADOL) injection 15 mg (has no administration in time range)   ondansetron (ZOFRAN) injection 4 mg (4 mg IntraVENous Given 1/28/23 1006)   HYDROmorphone HCl PF (DILAUDID) injection 0.5 mg (0.5 mg IntraVENous Given 1/28/23 1004)       New Prescriptions    METHOCARBAMOL (ROBAXIN-750) 750 MG TABLET    Take 1 tablet by mouth 4 times daily as needed (Back pain/spasm)    OXYCODONE-ACETAMINOPHEN (ENDOCET) 5-325 MG PER TABLET    Take 1 tablet by mouth every 6 hours as needed for Pain for up to 5 days. Intended supply: 5 days. Take lowest dose possible to manage pain Max Daily Amount: 4 tablets        Carmelita Beth is a 36 y.o. female who presents to the Emergency Department with chief complaint of  No chief complaint on file. 53-year-old female presents the emergency department complaining of severe right hip pain status post fall yesterday.   Patient denies any head injury or loss of consciousness but describes severe pain to the right hip, worse with pressure, palpation, and sitting or laying down and mildly improved with standing. Patient denies any paralysis or paresthesias. No help with ice and ibuprofen. Patient currently on chronic NSAIDs for chronic bilateral carpal tunnel syndrome, neck pain and right knee pain. The history is provided by the patient and the spouse. Review of Systems   Constitutional:  Negative for chills and fever. Musculoskeletal:  Positive for arthralgias. Negative for back pain, neck pain and neck stiffness. All other systems reviewed and are negative. Past Medical History:   Diagnosis Date    Asthma     Fatty liver     Fibromyalgia     Former cigarette smoker     GERD (gastroesophageal reflux disease)     no current meds    History of bladder cancer     6 weeks of BCG    History of blood transfusion 2013    History of breast cancer     Tamoxifen for 3 years/ Right breast- lumpectomy    History of COVID-19 12/2020    pt hospitalized on BIPAP    History of gastric ulcer     Mitral valve prolapse     PONV (postoperative nausea and vomiting)     post-op. Pt does well with antiemetic    Retention of urine     X1 with cervical fusion.     Seasonal allergies     SVT (supraventricular tachycardia) ([address]     attempted ablation in 2002- last episodes of SVT- 2004    SVT (supraventricular tachycardia) ([address]         Past Surgical History:   Procedure Laterality Date    APPENDECTOMY      ARM SURGERY Right 06/27/2022    Right cubital tunnel release on the right [facility] Right     breast cancer  lumpectomy    CARPAL TUNNEL RELEASE Right 06/27/2022    Open right carpal tunnel release performed by Karen Fong MD at [address] [place] [address] Left 9/15/2022    Left CARPAL TUNNEL RELEASE performed by Karen Fong MD at [facility] 9/15/2022    FINGER TRIGGER RELEASE on the left index, ring and thumb performed by Viridiana Leo MD at [address] ([facility])      ORTHOPEDIC SURGERY Right 2010    reconstructive knee surgery on Right knee    ABIDA AND BSO (CERVIX REMOVED)      TONSILLECTOMY AND ADENOIDECTOMY          Family History   Problem Relation Age of Onset    Hypertension Mother     Diabetes Mother     Bipolar Disorder Mother     Obesity Mother     Kidney Disease Mother     Diabetes Father     Heart Disease Father     High Cholesterol Father     Schizophrenia Sister     Bipolar Disorder Son     Bipolar Disorder Daughter         Social History     Socioeconomic History    Marital status:    Tobacco Use    Smoking status: Former     Packs/day: 1.00     Years: 7.00     Pack years: 7.00     Types: Cigarettes     Start date: 2005     Quit date: 2012     Years since quittin.0    Smokeless tobacco: Never   Vaping Use    Vaping Use: Never used   Substance and Sexual Activity    Alcohol use: Yes     Comment: occassionally    Drug use: Not Currently     Types: Marijuana Nora Fragoso     Social Determinants of Health     Financial Resource Strain: Low Risk     Difficulty of Paying Living Expenses: Not hard at all   Food Insecurity: No Food Insecurity    Worried About Running Out of Food in the Last Year: Never true    Ran Out of Food in the Last Year: Never true   Transportation Needs: No Transportation Needs    Lack of Transportation (Medical): No    Lack of Transportation (Non-Medical): No   Physical Activity: Inactive    Days of Exercise per Week: 0 days    Minutes of Exercise per Session: 0 min   Stress: Stress Concern Present    Feeling of Stress : To some extent   Social Connections:  Moderately Isolated    Frequency of Communication with Friends and Family: More than three times a week    Frequency of Social Gatherings with Friends and Family: More than three times a week    Attends [employer] Services: Never    Active Member of Clubs or Organizations: No    Attends Club or Organization Meetings: Never    Marital Status:    Intimate Partner Violence: Not At Risk    Fear of Current or Ex-Partner: No    Emotionally Abused: No    Physically Abused: No    Sexually Abused: No   Housing Stability: Unknown    Unable to Pay for Housing in the Last Year: No    Unstable Housing in the Last Year: No         Morphine and Sulfa antibiotics     Previous Medications    ACETAMINOPHEN (TYLENOL) 500 MG TABLET    Take 500 mg by mouth every 6 hours as needed for Pain    ALBUTEROL SULFATE, SENSOR, (Bill Ghapril) 108 (90 BASE) MCG/ACT AEPB    Inhale into the lungs as needed    AMITRIPTYLINE (ELAVIL) 10 MG TABLET    Take 2 tablets by mouth nightly    AMITRIPTYLINE (ELAVIL) 25 MG TABLET    Take 1 tablet by mouth nightly for 20 days    ATOGEPANT (QULIPTA) 60 MG TABS    Take 60 mg by mouth daily    BUDESONIDE-FORMOTEROL (SYMBICORT) 160-4.5 MCG/ACT AERO    Inhale 2 puffs into the lungs 2 times daily    CAPSAICIN 0.1 % CREA    Apply to left hand twice daily as directed    DICLOFENAC (VOLTAREN) 75 MG EC TABLET    Take 75 mg by mouth 2 times daily    DICLOFENAC SODIUM (VOLTAREN) 1 % GEL    Apply 2 g topically 2 times daily as needed for Pain    FLUTICASONE (FLONASE) 50 MCG/ACT NASAL SPRAY    1 spray by Nasal route in the morning and at bedtime     FLUTICASONE FUROATE-VILANTEROL (BREO ELLIPTA IN)    Inhale into the lungs in the morning and at bedtime    GABAPENTIN (NEURONTIN) 300 MG CAPSULE    600mg qam and qpm, and 900mg at bedtime    LIDOCAINE (LIDODERM) 5 %    Place 1 patch onto the skin as needed     MELOXICAM (MOBIC) 15 MG TABLET    Take 1 tablet by mouth daily for 21 days    MELOXICAM (MOBIC) 15 MG TABLET    Take 1 tablet by mouth daily for 21 days    MELOXICAM (MOBIC) 15 MG TABLET    Take 1 tablet by mouth daily for 28 days    MELOXICAM (MOBIC) 15 MG TABLET    Take 1 tablet by mouth daily METHYLPREDNISOLONE (MEDROL DOSEPACK) 4 MG TABLET    Follow package instructions    MONTELUKAST (SINGULAIR) 10 MG TABLET    Take 10 mg by mouth nightly     ONDANSETRON (ZOFRAN ODT) 4 MG DISINTEGRATING TABLET    Take 1 tablet by mouth every 8 hours as needed for Nausea or Vomiting    ONDANSETRON (ZOFRAN ODT) 4 MG DISINTEGRATING TABLET    Take 1 tablet by mouth every 8 hours as needed for Nausea or Vomiting    RIMEGEPANT SULFATE 75 MG TBDP    Take 75 mg by mouth every other day    SUMATRIPTAN (IMITREX) 50 MG TABLET    One at onset of migraine and can repeat once in one hour for continued headache Do note exceed 100 mg in 24 hours    TIZANIDINE (ZANAFLEX) 4 MG TABLET    Take 4 mg by mouth at bedtime     VITAMIN C (ASCORBIC ACID) 500 MG TABLET    Take 1 tablet by mouth daily    VITAMIN D (ERGOCALCIFEROL) 1.25 MG (34344 UT) CAPS CAPSULE    Take 1 capsule by mouth once a week for 4 doses        Vitals signs and nursing note reviewed.   Patient Vitals for the past 4 hrs:   Temp Pulse Resp BP SpO2   01/28/23 0901 99.5 °F (37.5 °C) 90 22 115/68 98 %          Physical Exam  Vitals and nursing note reviewed.   Constitutional:       General: She is in acute distress.   HENT:      Head: Normocephalic and atraumatic.      Right Ear: External ear normal.      Left Ear: External ear normal.      Nose: Nose normal.      Mouth/Throat:      Mouth: Mucous membranes are moist.   Eyes:      Extraocular Movements: Extraocular movements intact.      Conjunctiva/sclera: Conjunctivae normal.      Pupils: Pupils are equal, round, and reactive to light.   Cardiovascular:      Rate and Rhythm: Normal rate and regular rhythm.      Heart sounds: No murmur heard.  Pulmonary:      Effort: Pulmonary effort is normal.      Breath sounds: Normal breath sounds.   Musculoskeletal:         General: Tenderness present. No deformity.      Cervical back: Normal range of motion and neck supple.      Lumbar back: No swelling, deformity, spasms or tenderness.  Normal range of motion.      Right hip: Tenderness present. No deformity or crepitus. Decreased range of motion. Normal strength.      Left hip: Normal.   Skin:     General: Skin is warm and dry.   Neurological:      General: No focal deficit present.      Mental Status: She is alert and oriented to person, place, and time.   Psychiatric:         Mood and Affect: Mood and affect normal.         Speech: Speech normal.        Procedures    The patient was observed in the ED. initial dose of Dilaudid gave mild relief to the patient's discomfort.  She was given a second dose of 0.5 mg of Dilaudid a little as well as Toradol 15 mg IV with moderate improvement.    Results Reviewed:  XR HIP 2-3 VW W PELVIS RIGHT   Final Result   1. No acute osseous abnormality of the right hip evident by plain film imaging.               I discussed the results of all labs, procedures, radiographs, and treatments with the patient and available family.  Treatment plan is agreed upon and the patient is ready for discharge.  All voiced understanding of the discharge plan and medication instructions or changes as appropriate.  Questions about treatment in the ED were answered.  All were encouraged to return should symptoms worsen or new problems develop.      Voice dictation software was used during the making of this note.  This software is not perfect and grammatical and other typographical errors may be present.  This note has not been completely proofread for errors.     Ruben Traylor MD  01/28/23 4809

## 2023-01-31 ENCOUNTER — OFFICE VISIT (OUTPATIENT)
Dept: ORTHOPEDIC SURGERY | Age: 43
End: 2023-01-31
Payer: COMMERCIAL

## 2023-01-31 DIAGNOSIS — S73.101A HIP SPRAIN, RIGHT, INITIAL ENCOUNTER: ICD-10-CM

## 2023-01-31 DIAGNOSIS — M17.11 PRIMARY OSTEOARTHRITIS OF RIGHT KNEE: ICD-10-CM

## 2023-01-31 DIAGNOSIS — S73.101A SPRAIN OF RIGHT HIP, INITIAL ENCOUNTER: Primary | ICD-10-CM

## 2023-01-31 PROCEDURE — 99214 OFFICE O/P EST MOD 30 MIN: CPT | Performed by: ORTHOPAEDIC SURGERY

## 2023-01-31 NOTE — PROGRESS NOTES
Name: Melanie Foley  YOB: 1980  Gender: female  MRN: [id number]      What: Right hip pain  How: Fall  When: 1/28/2023      HPI: Melanie Foley is a 36 y.o. female seen for right hip pain and bilateral knee pain right greater than left. She fell on 1/28/2023 injuring her right hip. She went to the emergency room where x-rays were reportedly negative. The right hip is very sore and painful. She is having difficulty ambulating. She has history of mitral valve prolapse, supraventricular tachycardia, asthma, breast cancer, bladder cancer. She notes a long history of knee issues. She had a history of bilateral knee patellofemoral instability right much worse than left. The left has always been treated nonsurgically. She had a Manuel osteotomy in 2010 and 2 subsequent procedures in 2010 to address patellofemoral instability in her right knee. She notes a long history of right knee pain. She had multiple injections but has not had a cortisone injection since 2017. The right knee hurts all the time it affects her quality of life. Left knee hurts too. The right knee is getting worse. It is affecting her quality of life. It is sore painful and swollen. ROS/Meds/PSH/PMH/FH/SH: A ten system review of systems was performed and is negative other than what is in the HPI. Tobacco:  reports that she quit smoking about 11 years ago. Her smoking use included cigarettes. She started smoking about 18 years ago. She has a 7.00 pack-year smoking history. She has never used smokeless tobacco.  There were no vitals taken for this visit.      Physical Examination:  She is an awake alert pleasant female ambulating with an antalgic gait on the right side      The left hip has range of motion of 0-90  Full external and internal rotation  No pain  Greater trochanter is nontender  She is neurovascularly intact    The left knee has a range of motion of 0 to 135 degrees  negative Lachman,  negative anterior drawer,   negative posterior drawer  negative pivot. Good tibial step-off,   No varus or valgus laxity at 0 or 30 degrees. Negative lateral joint line tenderness   negative lateral Tawana. Negative medial joint line tenderness  negative medial Tawana. No evidence of any posterolateral instability. Moderate patellofemoral pain. Negative compression,   negative apprehension  no effusion. Calves Are non-tender,  neurovascularly patient is intact. Negative Homans. MPFL is non-tender. Patient Can fully extend the knee. Good quad tone  No erythema. Negative Dial test.      The right hip has a range of motion of 0-60  Greater trochanter is tender  She has diffuse pain in her buttocks  Exam is limited due to pain  She is neurovascularly intact      The right knee has well-healed incisions  The right knee has a range of motion of 5 to 125 degrees  negative Lachman,  negative anterior drawer,   negative posterior drawer  negative pivot. Good tibial step-off,   No varus or valgus laxity at 0 or 30 degrees. Negative lateral joint line tenderness   Positive lateral Tawana. Positive medial joint line tenderness  Equivocal medial Tawana. No evidence of any posterolateral instability. Marked patellofemoral pain. Negative compression,   negative apprehension  no effusion. Calves Are non-tender,  neurovascularly patient is intact. Negative Homans. MPFL is non-tender. Patient Can fully extend the knee. Good quad tone  No erythema. Negative Dial test.            Data Reviewed:    X-rays of the right hip from 1/28/2023 demonstrate no fracture joint spaces preserved    MRI right knee dated 9/28/2022 demonstrates ACL and PCL to be intact. Medial meniscus is intact. Some increased signal seen in the lateral meniscus without a definitive tear. She has osteoarthritis primarily in her patellofemoral and lateral compartments         Minor Procedure:        Impression:   1.  Hip sprain, right, initial encounter    2. Primary osteoarthritis of right knee       Rule out internal derangement right hip  Status post arthroscopy right knee debridement hardware removal 8/31/2010   status post EUA and manipulation right knee, arthroscopy right knee debridement and hardware removal 5/4/2010  Status post arthroscopy right knee lateral release and open Manuel tibial tubercle osteotomy 1/12/2010  Rule out internal derangement left knee  Mitral valve prolapse  Supraventricular tachycardia  Asthma  History of breast cancer  History of bladder cancer    Plan:   I discussed the problem with the patient. She has an acute right hip injury. Her pain is severe. I am worried about an occult fracture. We will defer any treatment into the right hip for now including injections. I would like to obtain an MRI of the right hip. I will reassess her progress back following the MRI of the right hip. In regards to her right knee, I discussed nonoperative versus operative intervention including injections. The cortisone injection did not resolve her right knee issues. We discussed viscosupplementation versus PRP but I do not believe that will resolve her issue. Her right knee is becoming a quality-of-life issue. She is not an arthroscopic right knee candidate. I will refer her to Dr. Mj Fair for an evaluation and consideration of possible right total knee arthroplasty. We will proceed as outlined above    4.  acute complicated injury    Follow up: No follow-ups on file.            Cristiane King MD

## 2023-02-02 ENCOUNTER — TELEPHONE (OUTPATIENT)
Dept: ORTHOPEDIC SURGERY | Age: 43
End: 2023-02-02

## 2023-02-03 ENCOUNTER — TELEPHONE (OUTPATIENT)
Dept: ORTHOPEDIC SURGERY | Age: 43
End: 2023-02-03

## 2023-02-03 DIAGNOSIS — S73.101A HIP SPRAIN, RIGHT, INITIAL ENCOUNTER: Primary | ICD-10-CM

## 2023-02-03 RX ORDER — METHYLPREDNISOLONE 4 MG/1
TABLET ORAL
Qty: 1 KIT | Refills: 0 | Status: SHIPPED | OUTPATIENT
Start: 2023-02-03

## 2023-02-03 NOTE — TELEPHONE ENCOUNTER
Called patient. Explained to her that she needs an appt to get MRI results. Per Julio Wills, bringing her in Monday 2/6/23 at 8am. Patient said she would here. Patient also asking for rx for tramadol for pain, she has taken all pain med from hospital. I told her I would have to ask and call her back.

## 2023-02-03 NOTE — TELEPHONE ENCOUNTER
Called patient. She had requested pain medication. Told her per Dr. Jeyson Morocho, he cannot send in narcotic for her. He offered gabapentin and a steroid pack. She states she already on gabapentin, but would like a steroid pack sent in to pharacy on file.

## 2023-02-06 ENCOUNTER — OFFICE VISIT (OUTPATIENT)
Dept: ORTHOPEDIC SURGERY | Age: 43
End: 2023-02-06
Payer: COMMERCIAL

## 2023-02-06 DIAGNOSIS — S73.101D SPRAIN OF RIGHT HIP, SUBSEQUENT ENCOUNTER: Primary | ICD-10-CM

## 2023-02-06 DIAGNOSIS — M54.50 ACUTE LOW BACK PAIN, UNSPECIFIED BACK PAIN LATERALITY, UNSPECIFIED WHETHER SCIATICA PRESENT: ICD-10-CM

## 2023-02-06 PROCEDURE — 99214 OFFICE O/P EST MOD 30 MIN: CPT | Performed by: ORTHOPAEDIC SURGERY

## 2023-02-06 RX ORDER — TRAMADOL HYDROCHLORIDE 50 MG/1
50 TABLET ORAL EVERY 6 HOURS PRN
Qty: 40 TABLET | Refills: 0 | Status: SHIPPED | OUTPATIENT
Start: 2023-02-06 | End: 2023-02-16

## 2023-02-06 NOTE — PROGRESS NOTES
Name: Trevor Mcfadden  YOB: 1980  Gender: female  MRN: [id number]            HPI: Trevor Mcfadden is a 36 y.o. female seen for right hip pain and bilateral knee pain right greater than left. She fell on 1/28/2023 injuring her right hip. She went to the emergency room where x-rays were reportedly negative. The right hip is very sore and painful. She is having difficulty ambulating. She has history of mitral valve prolapse, supraventricular tachycardia, asthma, breast cancer, bladder cancer. She notes a long history of knee issues. She had a history of bilateral knee patellofemoral instability right much worse than left. The left has always been treated nonsurgically. She had a Manuel osteotomy in 2010 and 2 subsequent procedures in 2010 to address patellofemoral instability in her right knee. She notes a long history of right knee pain. She had multiple injections but has not had a cortisone injection since 2017. The right knee hurts all the time it affects her quality of life. Left knee hurts too. The right knee is getting worse. It is affecting her quality of life. It is sore painful and swollen. She complains of persistent right hip pain. She is now having low back pain radiating into her groin. She had low back issues before. She is taking a Medrol Dosepak and gabapentin without relief. She is ambulating with a cane. ROS/Meds/PSH/PMH/FH/SH: A ten system review of systems was performed and is negative other than what is in the HPI. Tobacco:  reports that she quit smoking about 11 years ago. Her smoking use included cigarettes. She started smoking about 18 years ago. She has a 7.00 pack-year smoking history. She has never used smokeless tobacco.  There were no vitals taken for this visit.      Physical Examination:  She is an awake alert pleasant female ambulating with an antalgic gait on the right side      The left hip has range of motion of 0-90  Full external and internal rotation  No pain  Greater trochanter is nontender  She is neurovascularly intact    The left knee has a range of motion of 0 to 135 degrees  negative Lachman,  negative anterior drawer,   negative posterior drawer  negative pivot. Good tibial step-off,   No varus or valgus laxity at 0 or 30 degrees. Negative lateral joint line tenderness   negative lateral Tawana. Negative medial joint line tenderness  negative medial Tawana. No evidence of any posterolateral instability. Moderate patellofemoral pain. Negative compression,   negative apprehension  no effusion. Calves Are non-tender,  neurovascularly patient is intact. Negative Homans. MPFL is non-tender. Patient Can fully extend the knee. Good quad tone  No erythema. Negative Dial test.      The right hip has a range of motion of 0-60  Greater trochanter is tender  She has diffuse pain in her buttocks  Exam is limited due to pain  She is neurovascularly intact      The right knee has well-healed incisions  The right knee has a range of motion of 5 to 125 degrees  negative Lachman,  negative anterior drawer,   negative posterior drawer  negative pivot. Good tibial step-off,   No varus or valgus laxity at 0 or 30 degrees. Negative lateral joint line tenderness   Positive lateral Tawana. Positive medial joint line tenderness  Equivocal medial Tawana. No evidence of any posterolateral instability. Marked patellofemoral pain. Negative compression,   negative apprehension  no effusion. Calves Are non-tender,  neurovascularly patient is intact. Negative Homans. MPFL is non-tender. Patient Can fully extend the knee. Good quad tone  No erythema. Negative Dial test.            Data Reviewed:    X-rays of the right hip from 1/28/2023 demonstrate no fracture joint spaces preserved    MRI right knee dated 9/28/2022 demonstrates ACL and PCL to be intact. Medial meniscus is intact.   Some increased signal seen in the lateral meniscus without a definitive tear. She has osteoarthritis primarily in her patellofemoral and lateral compartments    MRI of the right hip demonstrates no fracture. No labral tear. Articular cartilage appears intact    Minor Procedure:        Impression:   1. Hip sprain, right, initial encounter       Rule out internal derangement right hip  Low back pain  Osteoarthritis right knee  Status post arthroscopy right knee debridement hardware removal 8/31/2010   status post EUA and manipulation right knee, arthroscopy right knee debridement and hardware removal 5/4/2010  Status post arthroscopy right knee lateral release and open Manuel tibial tubercle osteotomy 1/12/2010    Rule out internal derangement left knee  Mitral valve prolapse  Supraventricular tachycardia  Asthma  History of breast cancer  History of bladder cancer    Plan:   I discussed the problem with the patient. She has an acute right hip injury. Her pain is severe. She also has low back issues and has pain radiating into her right groin. Her right knee is becoming a quality-of-life issue. She is not an arthroscopic right knee candidate. I will refer her to Dr. Britta Watson for an evaluation and consideration of possible right total knee arthroplasty. She has an appointment March 2, 2023. I will refer her to Dima Ren and Britany the spine team for an evaluation of her low back. I provided her a tramadol prescription. I will reassess her progress back on an as-needed basis. 4.  acute complicated injury    Follow up: No follow-ups on file.            James Bynum MD

## 2023-02-15 ENCOUNTER — OFFICE VISIT (OUTPATIENT)
Dept: ORTHOPEDIC SURGERY | Age: 43
End: 2023-02-15

## 2023-02-15 DIAGNOSIS — M53.3 SACROILIAC JOINT DYSFUNCTION: Primary | ICD-10-CM

## 2023-02-15 DIAGNOSIS — M47.816 FACET ARTHROPATHY, LUMBAR: ICD-10-CM

## 2023-02-15 DIAGNOSIS — Z98.1 STATUS POST CERVICAL ARTHRODESIS: ICD-10-CM

## 2023-02-15 RX ORDER — MELOXICAM 15 MG/1
TABLET ORAL
Qty: 30 TABLET | Refills: 1 | OUTPATIENT
Start: 2023-02-15

## 2023-02-15 NOTE — LETTER
Henrene Master                                                     WILLIS EMMANUEL  1980  MRN [id number]                                              ROOM NUMBER______      Radiographic Studies:    Cervical MRI      Thoracic MRI         Lumbar MRI          Pelvis MRI        CONTRAST    CT Myelogram: _______________   NCS/EMG ________________ ( UE  /  LE )     MRI of ___________________          Other: ____________________      Injections:    KNEE    HIP  Depomedrol _____ mg Euflexxa _____    _______________ TFESI/SNRB  _______________ SI Joint  _______________ HAILY    _______________ Facet  _______________Piriformis/ Sciatica      Medications:    Oral Steroids _______________  NSAIDS _______________    Muscle Relaxers _______________  Neurontin/Lyrica _______________    Pain Medicine _______________  Other _______________                       Physical Therapy:    Lumbar     Thoracic      Cervical     Hip       Knee       Shoulder               Traction          Ultrasound          Dry Needling      Referral:    Pain referral:  [facility]   [facility]   Other: ______________________________    Follow-up/ Refer__________________________________________________    Authorization to hold blood thinners:___________________________________

## 2023-02-15 NOTE — PROGRESS NOTES
Name: Marilyn Summers  YOB: 1980  Gender: female  MRN: [id number]    CC: Back Pain (Right side low back pain that radiates to the front of right thigh)       HPI: This is a 36y.o. year old female who  has a past medical history of Asthma, Fatty liver, Fibromyalgia, Former cigarette smoker, GERD (gastroesophageal reflux disease), History of bladder cancer, History of blood transfusion, History of breast cancer, History of COVID-19, History of gastric ulcer, Mitral valve prolapse, PONV (postoperative nausea and vomiting), Retention of urine, Seasonal allergies, SVT (supraventricular tachycardia) ([facility] [address] and SVT (supraventricular tachycardia) ([facility] [address] .  He had a fall on 1/28/2023 injuring her right hip. She slipped and fell onto the right hip. She did go to the emergency department. X-rays were performed showing no acute abnormality or osseous abnormality of the right hip. She did see Dr. Shakir Martinez for the persistent right hip pain that can radiate into the groin but mostly from the right lower back. He prescribed her a Medrol Dosepak and she was already on gabapentin. She has not had significant improvement. He did order MRI scan of the right hip showing no AVN or acute abnormality. Patient has a long history of chronic pain. She has had cervical fusions and she has seen Dr. Cristine Mansfield for the cervical spine with cervical MRI scan. She has been referred to pain management for cervical ablations. She is also had chronic lower back pain. She reports she had a disc herniation in [place] years ago. She said chronic lower back pain since then. Again she has seen Dr. Maurisio Cardenas with pain management. The current pain she has is in the right buttock it can radiate into the right groin it does not radiate to the leg. It is painful to weight-bear. She is using a cane. Pain can be 10 out of 10.        AMB PAIN ASSESSMENT 2/15/2023   Location of Pain Back   Location Modifiers Right   Severity of Pain 8 Quality of Pain Other (Comment); Sharp   Duration of Pain Persistent   Frequency of Pain Constant   Date Pain First Started 2/1/2023   Aggravating Factors Standing; Other (Comment); Bending   Limiting Behavior Some   Relieving Factors Other (Comment); Nsaids   Result of Injury Yes   Work-Related Injury No   Are there other pain locations you wish to document? No              ROS/Meds/PSH/PMH/FH/SH: I personally reviewed the patient's collected intake data. Below are the pertinents:    Allergies   Allergen Reactions    Morphine Nausea And Vomiting and Rash    Sulfa Antibiotics Rash         Current Outpatient Medications:     traMADol (ULTRAM) 50 MG tablet, Take 1 tablet by mouth every 6 hours as needed for Pain for up to 10 days.  Max Daily Amount: 200 mg, Disp: 40 tablet, Rfl: 0    methylPREDNISolone (MEDROL DOSEPACK) 4 MG tablet, Take as directed, Disp: 1 kit, Rfl: 0    methocarbamol (ROBAXIN-750) 750 MG tablet, Take 1 tablet by mouth 4 times daily as needed (Back pain/spasm), Disp: 28 tablet, Rfl: 0    Atogepant (QULIPTA) 60 MG TABS, Take 60 mg by mouth daily, Disp: 90 tablet, Rfl: 3    budesonide-formoterol (SYMBICORT) 160-4.5 MCG/ACT AERO, Inhale 2 puffs into the lungs 2 times daily, Disp: , Rfl:     Rimegepant Sulfate 75 MG TBDP, Take 75 mg by mouth every other day, Disp: 16 tablet, Rfl: 11    gabapentin (NEURONTIN) 300 MG capsule, 600mg qam and qpm, and 900mg at bedtime, Disp: 210 capsule, Rfl: 5    meloxicam (MOBIC) 15 MG tablet, Take 1 tablet by mouth daily, Disp: 30 tablet, Rfl: 1    Capsaicin 0.1 % CREA, Apply to left hand twice daily as directed, Disp: 42.5 g, Rfl: 1    meloxicam (MOBIC) 15 MG tablet, Take 1 tablet by mouth daily for 28 days, Disp: 28 tablet, Rfl: 0    vitamin D (ERGOCALCIFEROL) 1.25 MG (75791 UT) CAPS capsule, Take 1 capsule by mouth once a week for 4 doses, Disp: 4 capsule, Rfl: 0    vitamin C (ASCORBIC ACID) 500 MG tablet, Take 1 tablet by mouth daily, Disp: 30 tablet, Rfl: 3 methylPREDNISolone (MEDROL DOSEPACK) 4 MG tablet, Follow package instructions (Patient not taking: Reported on 1/12/2023), Disp: 1 kit, Rfl: 0    meloxicam (MOBIC) 15 MG tablet, Take 1 tablet by mouth daily for 21 days, Disp: 21 tablet, Rfl: 0    ondansetron (ZOFRAN ODT) 4 MG disintegrating tablet, Take 1 tablet by mouth every 8 hours as needed for Nausea or Vomiting, Disp: 20 tablet, Rfl: 0    amitriptyline (ELAVIL) 10 MG tablet, Take 2 tablets by mouth nightly, Disp: 180 tablet, Rfl: 3    meloxicam (MOBIC) 15 MG tablet, Take 1 tablet by mouth daily for 21 days, Disp: 21 tablet, Rfl: 0    diclofenac sodium (VOLTAREN) 1 % GEL, Apply 2 g topically 2 times daily as needed for Pain, Disp: 100 g, Rfl: 2    amitriptyline (ELAVIL) 25 MG tablet, Take 1 tablet by mouth nightly for 20 days (Patient taking differently: Take 20 mg by mouth nightly), Disp: 20 tablet, Rfl: 0    ondansetron (ZOFRAN ODT) 4 MG disintegrating tablet, Take 1 tablet by mouth every 8 hours as needed for Nausea or Vomiting, Disp: 20 tablet, Rfl: 0    diclofenac (VOLTAREN) 75 MG EC tablet, Take 75 mg by mouth 2 times daily, Disp: , Rfl:     acetaminophen (TYLENOL) 500 MG tablet, Take 500 mg by mouth every 6 hours as needed for Pain, Disp: , Rfl:     Fluticasone Furoate-Vilanterol (BREO ELLIPTA IN), Inhale into the lungs in the morning and at bedtime, Disp: , Rfl:     Albuterol Sulfate, sensor, (PROAIR DIGIHALER) 108 (90 Base) MCG/ACT AEPB, Inhale into the lungs as needed, Disp: , Rfl:     fluticasone (FLONASE) 50 MCG/ACT nasal spray, 1 spray by Nasal route in the morning and at bedtime , Disp: , Rfl:     lidocaine (LIDODERM) 5 %, Place 1 patch onto the skin as needed , Disp: , Rfl:     montelukast (SINGULAIR) 10 MG tablet, Take 10 mg by mouth nightly , Disp: , Rfl:     SUMAtriptan (IMITREX) 50 MG tablet, One at onset of migraine and can repeat once in one hour for continued headache Do note exceed 100 mg in 24 hours, Disp: , Rfl:     tiZANidine (ZANAFLEX) 4 MG tablet, Take 4 mg by mouth at bedtime , Disp: , Rfl:     Past Surgical History:   Procedure Laterality Date    APPENDECTOMY      ARM SURGERY Right 06/27/2022    Right cubital tunnel release on the right performed by Laura Sims MD at [address] LUMPECTOMY Right     breast cancer  lumpectomy    CARPAL TUNNEL RELEASE Right 06/27/2022    Open right carpal tunnel release performed by Laura Sims MD at [address] [place] [address] Left 9/15/2022    Left CARPAL TUNNEL RELEASE performed by Laura Sims MD at [facility] Left 9/15/2022    FINGER TRIGGER RELEASE on the left index, ring and thumb performed by Laura Sims MD at [address] ([facility])      ORTHOPEDIC SURGERY Right 2010    reconstructive knee surgery on Right knee    ABIDA AND BSO (CERVIX REMOVED)      TONSILLECTOMY AND ADENOIDECTOMY         Patient Active Problem List   Diagnosis    PSVT (paroxysmal supraventricular tachycardia) (HCC)    Mitral valve regurgitation    H/O cardiac radiofrequency ablation    Shortness of breath    Malignant neoplasm of breast ([address]    Malignant neoplasm of urinary bladder (HCC)    Focal nodular hyperplasia of liver    Cervical migraine syndrome    Neck pain    Chronic tension-type headache, intractable    Right carpal tunnel syndrome    Cubital tunnel syndrome on right    Arthritis of carpometacarpal (CMC) joint of right thumb    Left carpal tunnel syndrome    Trigger finger of left thumb    Trigger ring finger of left hand    Trigger finger of left hand    Trigger index finger of left hand    Intractable chronic migraine without aura and without status migrainosus    Cervico-occipital neuralgia of left side         Tobacco:  reports that she quit smoking about 11 years ago. Her smoking use included cigarettes. She started smoking about 18 years ago.  She has a 7.00 pack-year smoking history. She has never used smokeless tobacco.  Alcohol:   Social History     Substance and Sexual Activity   Alcohol Use Yes    Comment: occassionally        Physical Exam:   BMI: There is no height or weight on file to calculate BMI. GENERAL:  Adult in mild distress, well developed, well nourished Patient is appropriately conversant  MSK:  Examination of the lumbar spine reveals no sagittal or coronal imbalance   There is severe tenderness to palpation over the right sacroiliac joint and lower lumbar facet joints. No significant tenderness over the greater trochanter. ROM of bilateral hip(s) reveals positive Mario's maneuver on the right. No groin pain with range of motion. NEURO:  Cranial nerves grossly intact. No motor deficits. Straight leg testing is negative bilateral  Sensory testing reveals intact sensation to light touch and in the distribution of the L3-S1 dermatomes bilaterally  Ankle jerk is negative for clonus    PSYCH:  Alert and oriented X 3. Radiographic Studies:     AP, lateral and spot views of the lumbar spine:  2/15/23  AP of the lumbar spine shows levoscoliosis with apex being around L2. Pelvis is level. I see no obvious fractures of the sacrum. Pelvis is normal.  Sagittal view of the Lumbar spine shows well-maintained disc base heights. No compression fracture noted. There is lumbar facet arthropathy. No obvious fractures noted. No anterior listhesis. Impression: Lumbar facet arthropathy no acute abnormality. I independently reviewed the MRIs of the lumbar and cervical spine on [employer] as noted below. The MRI scans were prior to her recent fall. Lumbar MRI scan shows no disc herniations. There is lumbar facet arthropathy most prominent at L5-S1. No pars defect no stenosis. Cervical spine shows prior fusion C3-C6. There is no stenosis at adjacent levels. Severe left C5-6 facet joint arthropathy.   This facet joint arthropathy is at the level of a fusion. No disc herniation or compressive discopathy. I also independently reviewed the MRI of the hip and visualization of the sacrum does not show obvious sacral fracture. EXAM DATE: 06/08/2022  EXAM: MR-Lumbar Spine without contrast  REASON FOR EXAM: M51.16 - Intervertebral disc disorders with radiculopathy,  lumbar region     COMPARISON: None available  TECHNIQUE: Multi-sequence, multi-planar imaging was obtained through the  lumbar spine unenhanced. FINDINGS: Spinal hemangioma within the L1 vertebral body. Normally aligned  lumbar spine. Normal tip of the conus medullaris. L1-2: .    L2-3: Unremarkable. L3-4: Unremarkable. L4-5: Normal height and hydration of the disc and without disc displacement  or spinal stenosis. Mild facet joint arthrosis. L5-S1: Normal disc. No spinal stenosis. Mild facet joint arthrosis. No pars defects. Negative for subluxation. Wide patency of the neural  foramina. No segmentation anomaly. Normal caliber and root dispersion of  the filum terminale. No fatty filum. IMPRESSION: The intervertebral discs of the lumbar spine are within normal  limits. Mild lower lumbar facet joint arthrosis. No chronic fracture  deformity. EXAM DATE: 03/21/2022  EXAM: MR-Cervical Spine with and without contrast  REASON FOR EXAM: R29.898 - Other symptoms and signs involving the  musculoskeletal system  *-*-*  COMPARISON: None available. TECHNIQUE: Multi-sequence, multi-planar imaging was performed through the  cervical spine. The exam was performed pre and post administration of 7.0 mL  of IV Gadavist gadolinium. FINDINGS: Normal size and signal of the cervical cord. Unremarkable  alignment of the cervical spine. Status post C3-4 and C4-5 ACD&F. C5 and  C6 bodies are fused. C2-C3: No disc height loss or disc displacement. No spinal stenosis. C3-C4: Well established interbody fusion. No disc displacement. No spinal  stenosis.     C4-C5: Well established interbody fusion. Left-sided facet joint  arthropathy. C5-C6: C5 and C6 vertebral body are fused. Severe left-sided C5-6 facet  joint arthropathy. C6-C7: Mild degeneration of the disc and disc bulge. No spinal stenosis. C7-T1: No significant disc displacement. No spinal stenosis. Wide patency  of the T1 neural foramina. IMPRESSION: Fused C3 through C6 vertebral bodies. Severe left C5-6 facet  joint arthropathy. No disc herniation or compressive discopathy. EXAM DATE: 02/01/2023  EXAM: MR Right Hip Without Contrast  REASON FOR EXAM: S73.101 - Unspecified sprain of right hip  *-*-*  ADDITIONAL HISTORY: None. COMPARISON: None. TECHNIQUE: Multiplanar imaging of the right hip was performed using a body  coil configuration in a 1.5 Nahed magnet. T1, T2, proton density fat  suppressed and STIR pulse sequences were utilized. FINDINGS: No evidence of transient osteopenia, AVN or fracture of the right  hip. No joint effusion. The wide field of view images of the pelvic ring demonstrate no fracture. Contralateral hip demonstrates no fracture. Acetabular version estimated level roof the acetabulum measures 20 degrees  retroversion and at the femoral head 23 degrees anteversion. Center edge angle estimated 43 degrees. Alpha angle estimated at the 54 degrees  No detached labral tear is seen. There is evidence of a subchondral cyst is  noted in the anterior superior acetabulum measuring about 4 mm on sagittal  series 10, image 18. The iliopsoas tendon is intact. Remaining periarticular musculature and  tendons appear intact. No no adenopathy. Pelvic contents demonstrate no acute or suspicious  findings. IMPRESSION: No evidence of fracture. No detached labral tear. Assessment/Plan:       Diagnosis Orders   1. Sacroiliac joint dysfunction        2. Facet arthropathy, lumbar        3.  Status post cervical arthrodesis          In regards to the patient's acute pain in the right lower back buttock and right hip we do not see obvious fracture. We discussed she does have facet arthropathy L5-S1 this could be 1 etiology of her pain after her fall to exacerbate the facet agenic pain and this could be treated with facet injections. The other etiology is sacroiliac dysfunction. On the AP view of the x-ray there is a little bit of pelvic rotation And she certainly has pain over the sacroiliac joint. The MRI scan of the lumbar spine does not show spinal stenosis. There was also no disc herniations present. There is no neurogenic compression. She currently is not presenting with radicular symptoms. I do not see a role for further imaging of the lumbar spine with MRI scan. She also ask about the cervical spine I have reviewed the imaging. She is fused C3-6. There was evidence of facet arthropathy but I told her with a fusion she should not have pain from that facet arthropathy. I did not see any neurogenic compression or anything that would be amendable to further surgical intervention of the cervical spine. The patient was frustrated with the outcome and lack of further intervention that we can provide for her. She is under the care of pain management and I believe she may benefit from sacroiliac injection possibly lumbar facet injections. I do not have records from that practice and I do not know what has been done in the past but for this acute onset of pain, right sacroiliac injection would be recommended. We will defer this to her pain management physician to perform. Over 50% of this visit of 60 minutes was spent reviewing prior medical records and imaging, current imaging, diagnosis, treatment options, patient counseling and/or patient cooridination of care. No orders of the defined types were placed in this encounter. Orders Placed This Encounter   Procedures    XR LUMBAR SPINE (2-3 VIEWS)              Return for referral to pain management.      William Jones, IVELISSE  02/15/23      Elements of this note were created using speech recognition software. As such, errors of speech recognition may be present.

## 2023-02-17 ENCOUNTER — OFFICE VISIT (OUTPATIENT)
Dept: ORTHOPEDIC SURGERY | Age: 43
End: 2023-02-17

## 2023-02-17 DIAGNOSIS — M65.9 TENOSYNOVITIS: ICD-10-CM

## 2023-02-17 DIAGNOSIS — G56.02 LEFT CARPAL TUNNEL SYNDROME: ICD-10-CM

## 2023-02-17 DIAGNOSIS — M19.90 INFLAMMATORY ARTHROPATHY: ICD-10-CM

## 2023-02-17 DIAGNOSIS — M65.9 TENOSYNOVITIS: Primary | ICD-10-CM

## 2023-02-17 DIAGNOSIS — M18.12 ARTHRITIS OF CARPOMETACARPAL (CMC) JOINT OF LEFT THUMB: ICD-10-CM

## 2023-02-17 LAB
ALBUMIN SERPL-MCNC: 4 G/DL (ref 3.5–5)
ALBUMIN/GLOB SERPL: 1.3 (ref 0.4–1.6)
ALP SERPL-CCNC: 65 U/L (ref 50–136)
ALT SERPL-CCNC: 41 U/L (ref 12–65)
ANION GAP SERPL CALC-SCNC: 5 MMOL/L (ref 2–11)
AST SERPL-CCNC: 19 U/L (ref 15–37)
BASOPHILS # BLD: 0.1 K/UL (ref 0–0.2)
BASOPHILS NFR BLD: 1 % (ref 0–2)
BILIRUB SERPL-MCNC: 0.5 MG/DL (ref 0.2–1.1)
BUN SERPL-MCNC: 9 MG/DL (ref 6–23)
CALCIUM SERPL-MCNC: 10 MG/DL (ref 8.3–10.4)
CHLORIDE SERPL-SCNC: 113 MMOL/L (ref 101–110)
CO2 SERPL-SCNC: 23 MMOL/L (ref 21–32)
CREAT SERPL-MCNC: 0.7 MG/DL (ref 0.6–1)
CRP SERPL-MCNC: <0.3 MG/DL (ref 0–0.9)
DIFFERENTIAL METHOD BLD: NORMAL
EOSINOPHIL # BLD: 0.1 K/UL (ref 0–0.8)
EOSINOPHIL NFR BLD: 1 % (ref 0.5–7.8)
ERYTHROCYTE [DISTWIDTH] IN BLOOD BY AUTOMATED COUNT: 11.9 % (ref 11.9–14.6)
ERYTHROCYTE [SEDIMENTATION RATE] IN BLOOD: 10 MM/HR (ref 0–20)
GLOBULIN SER CALC-MCNC: 3.2 G/DL (ref 2.8–4.5)
GLUCOSE SERPL-MCNC: 110 MG/DL (ref 65–100)
HCT VFR BLD AUTO: 40.8 % (ref 35.8–46.3)
HGB BLD-MCNC: 14.2 G/DL (ref 11.7–15.4)
IMM GRANULOCYTES # BLD AUTO: 0 K/UL (ref 0–0.5)
IMM GRANULOCYTES NFR BLD AUTO: 0 % (ref 0–5)
LYMPHOCYTES # BLD: 2.3 K/UL (ref 0.5–4.6)
LYMPHOCYTES NFR BLD: 32 % (ref 13–44)
MCH RBC QN AUTO: 30.7 PG (ref 26.1–32.9)
MCHC RBC AUTO-ENTMCNC: 34.8 G/DL (ref 31.4–35)
MCV RBC AUTO: 88.3 FL (ref 82–102)
MONOCYTES # BLD: 0.4 K/UL (ref 0.1–1.3)
MONOCYTES NFR BLD: 5 % (ref 4–12)
NEUTS SEG # BLD: 4.3 K/UL (ref 1.7–8.2)
NEUTS SEG NFR BLD: 61 % (ref 43–78)
NRBC # BLD: 0 K/UL (ref 0–0.2)
PLATELET # BLD AUTO: 276 K/UL (ref 150–450)
PMV BLD AUTO: 10 FL (ref 9.4–12.3)
POTASSIUM SERPL-SCNC: 4.1 MMOL/L (ref 3.5–5.1)
PROT SERPL-MCNC: 7.2 G/DL (ref 6.3–8.2)
RBC # BLD AUTO: 4.62 M/UL (ref 4.05–5.2)
SODIUM SERPL-SCNC: 141 MMOL/L (ref 133–143)
URATE SERPL-MCNC: 3.4 MG/DL (ref 2.6–6)
WBC # BLD AUTO: 7.2 K/UL (ref 4.3–11.1)

## 2023-02-17 NOTE — PROGRESS NOTES
Patient was fit for a CMC splint for patients left hand/joint. I demonstrated that the thumb slides into the opening and Velcro on the dorsal side of the hand. The strap continues around the thumb and Velcro's again on the ventral side of hand or palm, and then continues through the thumb and first finger to Velcro again on the dorsal side of hand. Patient read and signed documenting they understand and agree to [facility]'s current DME return policy.

## 2023-02-17 NOTE — LETTER
DME Patient Authorization Form    Name: Elena Reyna  : 1980  MRN: [id number]   Age: 36 y.o. Gender: female  Delivery Address: [facility]     Diagnosis: No diagnosis found. Requested DME:  Aia 16 Splint- ($95.00) X 1 - left        Clinical Notes:     **Indicates non-covered items by insurance. Payment expected on date of service. Electronically signed by  Provider: Laura Sims MD__Date: 2023                            [facility] Tax ID # [id number]        Durable Medical Equipment and/or Orthotics Patient Consent     I understand that my physician has prescribed this medical supply as part of my treatment plan as a matter of Medical Necessity.  I understand that I have a choice in where I receive my prescribed orthopedic supplies and/or services.  I authorize [facility] to furnish this service/product and to provide my insurance carrier with any information requested in order to process for payment.  I instruct my insurance carrier to pay [facility] directly for these services/products.  I understand that my insurance carrier may deny payment for this supply because it is a non-covered item, deemed not medically necessary or considered experimental.   I understand that any cost not covered by my insurance carrier will be solely my financial responsibility.  I have received the Supplier Standards and have reviewed them.  I have received the prescribed item and have been fully instructed on the proper use of the above services/products.    ______ (Patient Initials) I understand that all DME items are non-returnable after being dispensed. Items still in sealed packaging may be returned up to 14 days after purchasing.  [address] will replace items that are defective.    ______ (Patient Initials) I understand that [facility] will not file a claim with my insurance carrier for this service/product and I am waiving my right to file a claim on my own for this service/product with my insurance company as this item is NON-COVERED (Denoted by the **) by my Insurance company/policy. ______ (Patient Initials) I understand that I am responsible to bring my equipment to the hospital for any surgery. ______________________________________________  ________________________  Patient / Tan Callejas            Thank you for considering [address] Your physician has prescribed specific medical equipment or devices for your home use. The following describes your rights and responsibilities as our customer. Right to Choose Providers: You have a choice regarding which company supplies your home medical equipment and devices, and to consult your physician in this decision. You may choose a medical supply store, a home medical equipment provider, or a specialist such as [facility]. [facility] will coordinate with your physician to provide the medical equipment or devices prescribed for your home use. Right to Service:  You have the right to considerate, respectful and nondiscriminatory care. You have the right to receive accurate and easily understood information about your health care. If you speak a foreign language, or don't understand the discussions, assistance will be provided to allow you to make informed health care decisions. You have the right to know your treatment options and to participate in decisions about your care, including the right to accept or refuse treatment. You have the right to expect a reasonable response to your requests for treatment or service. You have the right to talk in confidence with health care providers and to have your health care information protected.   You have the right to receive an explanation of your bill. You have the right to complain about the service or product you receive. Patient Responsibilities:  Please provide complete and accurate information about your health insurance benefits and make arrangements for the timely payment of your bill. [facility] will, if possible, assume responsibility for billing your insurance (Medicare, Medicaid and commercial) for the prescribed equipment or devices. If your policy does not cover the prescribed product, or only covers a portion of the bill, you are responsible for any remaining balance. Return and Exchange Policy:  [facility] will honor published  Warranties for products. [facility] will accept returns or exchanges within 14 days from the date of receipt, providin) the product must be in new condition; 2) receipt as required; and 3) used disposable and hygiene products may only be returned due to a defective product. Note: Refunds will be issued in a timely manner, please allow 4-6 weeks for processing. Complaint Procedures and DME Consumer Protection Resources:  [facility] values you as a customer, and is committed to resolving patient concerns. This commitment includes understanding and documenting your concerns, conducting a review of internal procedures, and providing you with an explanation and resolution to your concerns. Should you have any questions about our services or billing process, please contact our office at (practice phone number). If we are unable to resolve the concern, you have the right to direct comments to the office of Consumer Protection, in the [address] or the [facility] office, without fear of repercussion. DMEPOS SUPPLIER STANDARDS    A supplier must be in compliance with all applicable Federal and [facility] and regulatory requirements. A supplier must provide complete and accurate information on the DMEPOS supplier application.   Any changes to this information must be reported to the [facility] within 30 days. An authorized individual (one whose signature is binding) must sign the application for billing privileges. A supplier must fill orders from its own inventory, or must contract with other companies for the purchase of items necessary to fill the order. A supplier may not contract with any entity that is currently excluded from the Medicare program, any [facility] program, or from any other Federal procurement or Nonprocurement programs. A supplier must advise beneficiaries that they may rent or purchase inexpensive or routinely purchased durable medical equipment, and of the purchase option for capped rental equipment. A supplier must notify beneficiaries of warranty coverage and honor all warranties under applicable State Law, and repair or replace free of charge Medicare covered items that are under warranty. A supplier must maintain a physical facility on an appropriate site. A supplier must permit CMS, or its agents to conduct on-site inspections to ascertain the supplier's compliance with these standards. The supplier location must be accessible to beneficiaries during reasonable business hours, and must maintain a visible sign and posted hours of operation. A supplier must maintain a primary business telephone listed under the name of the business in a [employer] or a toll free number available through directory assistance. The exclusive use of a beeper, answering machine or cell phone is prohibited. A supplier must have comprehensive liability insurance in the amount of at least $300,000 that covers both the supplier's place of business and all customers and employees of the supplier. If the supplier manufactures its own items, this insurance must also cover product liability and completed operations.   A supplier must agree not to initiate telephone contact with beneficiaries, with a few exceptions allowed. This standard prohibits suppliers from calling beneficiaries in order to solicit new business. A supplier is responsible for delivery and must instruct beneficiaries on use of Medicare covered items, and maintain proof of delivery. A supplier must answer questions, and respond to complaints of the beneficiaries, and maintain documentation of such contacts. A supplier must maintain and replace at no charge or repair directly, or through a service contract with another company, Medicare covered items it has rented to beneficiaries. A supplier must accept returns of substandard (less than full quality for the particular item) or unsuitable items (inappropriate for the beneficiary at the time it was fitted and rented or sold) from beneficiaries. A supplier must disclose these supplier standards to each beneficiary to whom it supplies a Medicare-covered item. A supplier must disclose to the government any person having ownership, financial, or control interest in the supplier. A supplier must not convey or reassign a supplier number; i.e., the supplier may not sell or allow another entity to use its Medicare billing number. A supplier must have a complaint resolution protocol established to address beneficiary complaints that relate to these standards. A record of these complaints must be maintained at the physical facility. Complaint records must include: the name, address, telephone number and health insurance claim number of the beneficiary, a summary of the complaint, and any action taken to resolve it. A supplier must agree to furnish CMS any information required by the Medicare statute and implementing regulations. A supplier of DMEPOS and other items and services must be accredited by a CMS-approved accreditation organization in order to receive and retain a supplier billing number.  The accreditation must indicate the specific products and services, for which the supplier is accredited in order for the supplier to receive payment for those specific products and services. A DMEPOS supplier must notify their accreditation organization when a new DMEPOS location is opened. The accreditation organization may accredit the new supplier location for three months after it is operational without requiring a new site visit. All DMEPOS supplier locations, whether owned or subcontracted, must meet the [employer] and be separately accredited in order to bill Medicare. An accredited supplier may be denied enrollment or their enrollment may be revoked, if CMS determines that they are not in compliance with the DMEPOS quality standards. A DMEPOS supplier must disclose upon enrollment all products and services, including the addition of new product lines for which they are seeking accreditation. If a new product line is added after enrollment, the DMEPOS supplier will be responsible for notifying the accrediting body of the new product so that the DMEPOS supplier can be re-surveyed and accredited for these new products. Must meet the surety bond requirements specified in 42 C. F.R. 424.57(c). Implementation date- May 4, 2009. A supplier must obtain oxygen from a state-licensed oxygen supplier. A supplier must maintain ordering and referring documentation consistent with provisions found in 42 C. F.R. 424.516(f). DMEPOS suppliers are prohibited from sharing a practice location with certain other Medicare providers and suppliers. DMEPOS suppliers must remain open to the public for a minimum of 30 hours per week with certain exceptions.

## 2023-02-17 NOTE — PROGRESS NOTES
Orthopaedic Hand Surgery Note    Name: Reinier Christie  Age: 36 y.o. YOB: 1980  Gender: female  MRN: [id number]    Post Operative Visit: Left CARPAL TUNNEL RELEASE - Left and FINGER TRIGGER RELEASE on the left index, ring and thumb - Left    HPI: Patient is status post Left CARPAL TUNNEL RELEASE - Left and FINGER TRIGGER RELEASE on the left index, ring and thumb - Left on 9/15/2022. Patient reports ongoing pain in the hand, severe pain at the base of the left thumb, she reports the injection I gave her 2 months ago provided improvement in symptoms and since then now she can make a fist and she feels less pulling but she still has significant discomfort throughout the hand. Physical Examination:  Well-healed surgical wounds. Sensation is intact in all fingers. Motor exam reveals no deficits. Mild tenderness palpation of the left index and ring finger surgical scars, hyperextension of all the fingers causes moderate pain radiating down into the wrist, there is tenderness palpation of all the flexor tendons proximal to the carpal canal.  Severe tenderness palpation of the left thumb CMC joint, she is able make a full composite fist today    Imaging:     none    Assessment:   1. Tenosynovitis    2. Left carpal tunnel syndrome         Status post Left CARPAL TUNNEL RELEASE - Left and FINGER TRIGGER RELEASE on the left index, ring and thumb - Left on 9/15/2022    Plan:  We discussed the post operative course and progression. Comfort Cool brace for the left hand, Mobic 15 daily for 2 months, we again ordered a rheumatoid panel, the patient has had heart trouble and was unable to get her labs done, she did get recent labs at her PCP who refused to draw the rheumatoid panel because if they came back positive she would have to treat her for that condition. She is in the process of changing pain management specialist for her low back pain.   I will reassess the patient in 2 to 3 months, I will call her with the lab results once they appear in my in basket.   We again discussed that she may have some degree of flexor synovitis causing the stiffness of the joints, she has left thumb CMC joint for which I offered a steroid injection today but the patient politely declined    Bethany Lal MD  02/17/23  9:31 AM

## 2023-02-17 NOTE — LETTER
[address] [address]  Phone: [phone]  Fax: [phone]    Rancho Beckham MD        February 17, 2023     Patient: Elizabeth Carreno   YOB: 1980   Date of Visit: 2/17/2023       To Whom It May Concern: It is my medical opinion that Perla Sherman will continue the same work restrictions of 8 hour work days working 4 days per week until follow up in three months. If you have any questions or concerns, please don't hesitate to call.     Sincerely,        Rancho Beckham MD

## 2023-02-18 LAB — ANA SER QL: NEGATIVE

## 2023-02-19 LAB — CCP IGA+IGG SERPL IA-ACNC: <1 UNITS (ref 0–19)

## 2023-02-20 LAB — RHEUMATOID FACT SER QL LA: NEGATIVE

## 2023-02-27 ENCOUNTER — HOSPITAL ENCOUNTER (OUTPATIENT)
Dept: MRI IMAGING | Age: 43
Discharge: HOME | End: 2023-03-02
Payer: COMMERCIAL

## 2023-02-27 DIAGNOSIS — G43.719 INTRACTABLE CHRONIC MIGRAINE WITHOUT AURA AND WITHOUT STATUS MIGRAINOSUS: ICD-10-CM

## 2023-02-27 DIAGNOSIS — G43.809 CERVICAL MIGRAINE SYNDROME: ICD-10-CM

## 2023-02-27 PROCEDURE — 70553 MRI BRAIN STEM W/O & W/DYE: CPT

## 2023-02-27 PROCEDURE — A9579 GAD-BASE MR CONTRAST NOS,1ML: HCPCS | Performed by: PSYCHIATRY & NEUROLOGY

## 2023-02-27 PROCEDURE — 6360000004 HC RX CONTRAST MEDICATION: Performed by: PSYCHIATRY & NEUROLOGY

## 2023-02-27 PROCEDURE — 2580000003 HC RX 258: Performed by: PSYCHIATRY & NEUROLOGY

## 2023-02-27 RX ORDER — SODIUM CHLORIDE 0.9 % (FLUSH) 0.9 %
10 SYRINGE (ML) INJECTION AS NEEDED
Status: DISCONTINUED | OUTPATIENT
Start: 2023-02-27 | End: 2023-03-03 | Stop reason: HOSPADM

## 2023-02-27 RX ADMIN — SODIUM CHLORIDE, PRESERVATIVE FREE 10 ML: 5 INJECTION INTRAVENOUS at 18:33

## 2023-02-27 RX ADMIN — GADOTERIDOL 15 ML: 279.3 INJECTION, SOLUTION INTRAVENOUS at 18:34

## 2023-02-28 LAB — HLA-B27 QL NAA+PROBE: NEGATIVE

## 2023-03-02 ENCOUNTER — OFFICE VISIT (OUTPATIENT)
Dept: ORTHOPEDIC SURGERY | Age: 43
End: 2023-03-02

## 2023-03-02 DIAGNOSIS — M25.561 RIGHT KNEE PAIN, UNSPECIFIED CHRONICITY: Primary | ICD-10-CM

## 2023-03-02 NOTE — PROGRESS NOTES
Patient ID:  Gisela Heard  [id number]  29 y.o.  1980    Today: March 2, 2023          Chief Complaint: Right Knee pain    HPI:       Gisela Heard is a 36 y.o. female seen for evaluation and treatment of their knee pain. Patient reports a longstanding history of pain involving the knee. The patient complains of knee pain with activities, reports pain as mostly occurring along the joint lines, reports stiffness of the knee with prolonged inactivity, and swelling/pain at the end of the day and after increased physical activity. The patient's knee pain cause moderate to severe limitations in the activities of rising from bed, putting on socks/shoes, rising from a sitting position, bending towards the floor and kneeling, twisting and pivoting on the limb and squatting. In addition, at times the patient reports extreme difficulty with these activities. Generally, symptoms improve with sitting/rest. The pain affects the patients activities of daily living and quality of life. Patient reports progressive pain and instability in the knee. The pain has been present for an extended period of time. Pain ranges from approximately 4-8 in a cyclical fashion with periods of acute exacerbation. Prior procedures on the knee include Manuel Osteotomy. Patient has attempted prior conservative treatment including medications and activity modification. Treatment to date has included oral medications inclusive of over-the-counter medications (NSAIDS and/or Tylenol) +/-prescription medications, activity modification, weight loss (if applicable), and injections which include corticosteroids and/or viscosupplimentation.     Past Medical History:  Past Medical History:   Diagnosis Date    Asthma     Fatty liver     Fibromyalgia     Former cigarette smoker     GERD (gastroesophageal reflux disease)     no current meds    History of bladder cancer     6 weeks of BCG    History of blood transfusion 2013    History of breast cancer     Tamoxifen for 3 years/ Right breast- lumpectomy    History of COVID-19 12/2020    pt hospitalized on BIPAP    History of gastric ulcer     Mitral valve prolapse     PONV (postoperative nausea and vomiting)     post-op. Pt does well with antiemetic    Retention of urine     X1 with cervical fusion. Seasonal allergies     SVT (supraventricular tachycardia) ([address]     attempted ablation in 2002- last episodes of SVT- 2004    SVT (supraventricular tachycardia) ([address]        Past Surgical History:  Past Surgical History:   Procedure Laterality Date    APPENDECTOMY      ARM SURGERY Right 06/27/2022    Right cubital tunnel [facility] on the right [facility] Right     breast cancer  lumpectomy    CARPAL TUNNEL RELEASE Right 06/27/2022    Open right carpal tunnel release performed by Antonia Marin MD at [address] [place] [address] Left 9/15/2022    Left CARPAL TUNNEL RELEASE performed by Antonia Marin MD at [facility] Left 9/15/2022    FINGER TRIGGER RELEASE on the left index, ring and thumb performed by Antonia Marin MD at [address] ([facility])      ORTHOPEDIC SURGERY Right 2010    reconstructive knee surgery on Right knee    ABIDA AND BSO (CERVIX REMOVED)      TONSILLECTOMY AND ADENOIDECTOMY          Medications:     Prior to Admission medications    Medication Sig Start Date End Date Taking?  Authorizing Provider   methylPREDNISolone (MEDROL DOSEPACK) 4 MG tablet Take as directed 2/3/23   Gauri Sanchez MD   methocarbamol (ROBAXIN-750) 750 MG tablet Take 1 tablet by mouth 4 times daily as needed (Back pain/spasm) 1/28/23   Mike Roman MD   Atogepant (QULIPTA) 60 MG TABS Take 60 mg by mouth daily 1/19/23   Abi Schmidt MD   budesonide-formoterol (SYMBICORT) 160-4.5 MCG/ACT AERO Inhale 2 puffs into the lungs 2 times daily 11/2/22 Historical MD Cricket   Rimegepant Sulfate 75 MG TBDP Take 75 mg by mouth every other day 1/12/23   Miya Garnica MD   gabapentin (NEURONTIN) 300 MG capsule 600mg qam and qpm, and 900mg at bedtime 1/12/23 7/12/23  Miya Garnica MD   meloxicam (MOBIC) 15 MG tablet Take 1 tablet by mouth daily 12/21/22   Lazarus Dub, MD   Capsaicin 0.1 % CREA Apply to left hand twice daily as directed 11/16/22   Sheffield Romberg Cox, APRN - CNP   meloxicam (MOBIC) 15 MG tablet Take 1 tablet by mouth daily for 28 days 10/11/22 11/8/22  Sheffield Romberg Cox, APRN - CNP   vitamin D (ERGOCALCIFEROL) 1.25 MG (90729 UT) CAPS capsule Take 1 capsule by mouth once a week for 4 doses 10/11/22 11/2/22  ROSAURA Kamara CNP   vitamin C (ASCORBIC ACID) 500 MG tablet Take 1 tablet by mouth daily 10/11/22   ROSAURA Kamara CNP   methylPREDNISolone (MEDROL DOSEPACK) 4 MG tablet Follow package instructions  Patient not taking: Reported on 1/12/2023 9/27/22   Sheffield Romberg Cox, APRN - CNP   meloxicam (MOBIC) 15 MG tablet Take 1 tablet by mouth daily for 21 days 9/27/22 10/18/22  Sheffield Romberg Cox, APRN - CNP   ondansetron (ZOFRAN ODT) 4 MG disintegrating tablet Take 1 tablet by mouth every 8 hours as needed for Nausea or Vomiting 9/15/22   ROSAURA Kamara CNP   amitriptyline (ELAVIL) 10 MG tablet Take 2 tablets by mouth nightly 9/15/22   Miya Garnica MD   meloxicam (MOBIC) 15 MG tablet Take 1 tablet by mouth daily for 21 days 9/14/22 10/5/22  Sheffield Romberg Cox, APRN - CNP   diclofenac sodium (VOLTAREN) 1 % GEL Apply 2 g topically 2 times daily as needed for Pain 8/15/22   Sarai Johnson MD   amitriptyline (ELAVIL) 25 MG tablet Take 1 tablet by mouth nightly for 20 days  Patient taking differently: Take 20 mg by mouth nightly 7/5/22 7/25/22  Lazarus Dub, MD   ondansetron (ZOFRAN ODT) 4 MG disintegrating tablet Take 1 tablet by mouth every 8 hours as needed for Nausea or Vomiting 6/27/22   ROSAURA Kamara - CNP   diclofenac (VOLTAREN) 75 MG EC tablet Take 75 mg by mouth 2 times daily    Historical Provider, MD   acetaminophen (TYLENOL) 500 MG tablet Take 500 mg by mouth every 6 hours as needed for Pain    Historical Provider, MD   Fluticasone Furoate-Vilanterol (BREO ELLIPTA IN) Inhale into the lungs in the morning and at bedtime    Historical Provider, MD   Albuterol Sulfate, sensor, (PROAIR DIGIHALER) 108 (90 Base) MCG/ACT AEPB Inhale into the lungs as needed    Historical Provider, MD   fluticasone (FLONASE) 50 MCG/ACT nasal spray 1 spray by Nasal route in the morning and at bedtime     Ar Automatic Reconciliation   lidocaine (LIDODERM) 5 % Place 1 patch onto the skin as needed     Ar Automatic Reconciliation   montelukast (SINGULAIR) 10 MG tablet Take 10 mg by mouth nightly  22   Ar Automatic Reconciliation   SUMAtriptan (IMITREX) 50 MG tablet One at onset of migraine and can repeat once in one hour for continued headache Do note exceed 100 mg in 24 hours 22   Ar Automatic Reconciliation   tiZANidine (ZANAFLEX) 4 MG tablet Take 4 mg by mouth at bedtime  22   Ar Automatic Reconciliation       Family History:     Family History   Problem Relation Age of Onset    Hypertension Mother     Diabetes Mother     Bipolar Disorder Mother     Obesity Mother     Kidney Disease Mother     Diabetes Father     Heart Disease Father     High Cholesterol Father     Schizophrenia Sister     Bipolar Disorder Son     Bipolar Disorder Daughter        Social History:      Social History     Tobacco Use    Smoking status: Former     Packs/day: 1.00     Years: 7.00     Pack years: 7.00     Types: Cigarettes     Start date: 2005     Quit date: 2012     Years since quittin.1    Smokeless tobacco: Never   Substance Use Topics    Alcohol use: Yes     Comment: occassionally         Allergies: Allergies   Allergen Reactions    Morphine Nausea And Vomiting and Rash    Sulfa Antibiotics Rash        Vitals:    There were no vitals taken for this visit.    ROS:   Review of Systems         Objective:   General: Patient is awake and in no acute distress  Psych: Mood and affect appropriate  HEENT: Normocephalic. Atramatic. Pupils equal, round and reactive. Sclera normal.   Neck: Supple without obvious mass   Chest: Symmetric  Lungs:  Breathing non-labored. No tachypnea noted. Abdomen: Soft on gross examination without obvious distention. Neuro: No obvious neurologic deficit. Grossly moves bilateral upper extremities without motor or sensory deficits. No gross weakness noted in the lower extremities. No hyporeflexia or hyperreflexia noted. Vascular: No gross arterial or venous deficiency noted. DP and PT pulses are palpable in the lower extremities  Lymphatic: No lymphedema noted in the lower extremities. Skin: No prior incisions noted about the right knee. No obvious rashes noted about the area. No skin changes noted about the knee or about the adjacent thigh or leg. Extremities:  Patient ambulates with an antalgic gait. There is pain with ROM of the right knee. Range of motion is 0-120. Trace effusion noted in the knee. Patellofemoral crepitus is present. Distally the patient shows no neurologic deficit. Xrays (obtained either today or previously)    Heading: Knee Xrays  Views: AP knee, skiers PA, lateral knee, sunrise view right knee  Clinical indication: Right Knee Pain   Findings: Xrays of the knees obtained today or previously show tricompartmental bone-on-bone arthritic changes of the right knee with associated osteophyte formation and subchondral sclerosis. Impression: Right Knee osteoarthritis    Zahra Malave MD      Assessment:   1. Arthritis of the Right knee    Plan:   Differential diagnosis and treatment options have been discussed with the patient. Risks, benefits and alternatives of each were discussed and patient questions answered.  The patient understands the nature of knee arthritis and that this is generally a progressive condition. At this point the patient has failed the aforementioned treatment modalities. At this point the patient would like to proceed with Total Knee Replacement. TREATMENT:    Total Knee Replacement- The patient has failed previous conservative treatment for this condition including anti-inflammatories, injections and lifestyle modifications. The necessity for joint replacement is present. Risks, benefits, alternatives and possible complications of right knee arthroplasty were discussed with the patient including but not limited to potential for infection, bleeding, damage to nerves and/or blood vessels, stiffness of the knee after surgery, knee instability after surgery, patellar maltracking, potential for fracture both intra-op and post-op, polyethylene wear, implant loosening, and risk for revision surgery secondary to but not limited to these discussed risks. Further, we have discussed potential for venous thrombo-embolism including deep vein thrombosis and pulmonary embolism despite being on prophylaxis. Additionally, we have discussed potential for continued pain after surgery and patient has voiced understanding that I can make no guarantees regarding the pain relief of outcomes after surgery. We have also previously discussed the potential of morbidity and mortality associated with, but not limited to, the actual surgical procedure, anesthesia, prior medical conditions, and/or the administration of medications perioperatively. The patient has been given the opportunity to ask questions all of which have been answered and the patient wishes to proceed.         Signed By: Zahra Malave MD  March 2, 2023

## 2023-03-06 DIAGNOSIS — M17.11 PRIMARY OSTEOARTHRITIS OF RIGHT KNEE: Primary | ICD-10-CM

## 2023-03-06 DIAGNOSIS — M21.961 ACQUIRED DEFORMITY OF RIGHT KNEE: ICD-10-CM

## 2023-03-17 ENCOUNTER — TELEPHONE (OUTPATIENT)
Dept: ORTHOPEDIC SURGERY | Age: 43
End: 2023-03-17

## 2023-03-31 DIAGNOSIS — M17.11 PRIMARY OSTEOARTHRITIS OF RIGHT KNEE: ICD-10-CM

## 2023-03-31 DIAGNOSIS — M21.961 ACQUIRED DEFORMITY OF RIGHT KNEE: ICD-10-CM

## 2023-04-03 RX ORDER — MELOXICAM 15 MG/1
TABLET ORAL
Qty: 21 TABLET | OUTPATIENT
Start: 2023-04-03

## 2023-04-18 ENCOUNTER — HOSPITAL ENCOUNTER (OUTPATIENT)
Dept: REHABILITATION | Age: 43
Discharge: HOME | End: 2023-04-21
Payer: COMMERCIAL

## 2023-04-18 ENCOUNTER — HOSPITAL ENCOUNTER (OUTPATIENT)
Dept: SURGERY | Age: 43
Discharge: HOME | End: 2023-04-21
Payer: COMMERCIAL

## 2023-04-18 VITALS
WEIGHT: 160.94 LBS | RESPIRATION RATE: 18 BRPM | BODY MASS INDEX: 30.41 KG/M2 | HEART RATE: 94 BPM | OXYGEN SATURATION: 100 % | DIASTOLIC BLOOD PRESSURE: 94 MMHG | SYSTOLIC BLOOD PRESSURE: 134 MMHG | TEMPERATURE: 98.6 F

## 2023-04-18 DIAGNOSIS — M17.11 PRIMARY OSTEOARTHRITIS OF RIGHT KNEE: ICD-10-CM

## 2023-04-18 LAB
ALBUMIN SERPL-MCNC: 4 G/DL (ref 3.5–5)
ANION GAP SERPL CALC-SCNC: 4 MMOL/L (ref 2–11)
APTT PPP: 31.2 SEC (ref 24.5–34.2)
BACTERIA SPEC CULT: NORMAL
BASOPHILS # BLD: 0.1 K/UL (ref 0–0.2)
BASOPHILS NFR BLD: 1 % (ref 0–2)
BUN SERPL-MCNC: 12 MG/DL (ref 6–23)
CALCIUM SERPL-MCNC: 10.1 MG/DL (ref 8.3–10.4)
CHLORIDE SERPL-SCNC: 113 MMOL/L (ref 101–110)
CO2 SERPL-SCNC: 23 MMOL/L (ref 21–32)
CREAT SERPL-MCNC: 0.74 MG/DL (ref 0.6–1)
DIFFERENTIAL METHOD BLD: NORMAL
EKG ATRIAL RATE: 93 BPM
EKG DIAGNOSIS: NORMAL
EKG P AXIS: 61 DEGREES
EKG P-R INTERVAL: 152 MS
EKG Q-T INTERVAL: 350 MS
EKG QRS DURATION: 72 MS
EKG QTC CALCULATION (BAZETT): 435 MS
EKG R AXIS: 61 DEGREES
EKG T AXIS: 18 DEGREES
EKG VENTRICULAR RATE: 93 BPM
EOSINOPHIL # BLD: 0.1 K/UL (ref 0–0.8)
EOSINOPHIL NFR BLD: 2 % (ref 0.5–7.8)
ERYTHROCYTE [DISTWIDTH] IN BLOOD BY AUTOMATED COUNT: 12.1 % (ref 11.9–14.6)
EST. AVERAGE GLUCOSE BLD GHB EST-MCNC: 97 MG/DL
GLUCOSE SERPL-MCNC: 99 MG/DL (ref 65–100)
HBA1C MFR BLD: 5 % (ref 4.8–5.6)
HCT VFR BLD AUTO: 41.7 % (ref 35.8–46.3)
HGB BLD-MCNC: 14.1 G/DL (ref 11.7–15.4)
IMM GRANULOCYTES # BLD AUTO: 0.1 K/UL (ref 0–0.5)
IMM GRANULOCYTES NFR BLD AUTO: 1 % (ref 0–5)
INR PPP: 0.9
LYMPHOCYTES # BLD: 2.4 K/UL (ref 0.5–4.6)
LYMPHOCYTES NFR BLD: 34 % (ref 13–44)
MCH RBC QN AUTO: 29.9 PG (ref 26.1–32.9)
MCHC RBC AUTO-ENTMCNC: 33.8 G/DL (ref 31.4–35)
MCV RBC AUTO: 88.3 FL (ref 82–102)
MONOCYTES # BLD: 0.4 K/UL (ref 0.1–1.3)
MONOCYTES NFR BLD: 6 % (ref 4–12)
NEUTS SEG # BLD: 3.9 K/UL (ref 1.7–8.2)
NEUTS SEG NFR BLD: 57 % (ref 43–78)
NRBC # BLD: 0 K/UL (ref 0–0.2)
PLATELET # BLD AUTO: 273 K/UL (ref 150–450)
PMV BLD AUTO: 9.4 FL (ref 9.4–12.3)
POTASSIUM SERPL-SCNC: 3.8 MMOL/L (ref 3.5–5.1)
PROTHROMBIN TIME: 12.1 SEC (ref 12.6–14.3)
RBC # BLD AUTO: 4.72 M/UL (ref 4.05–5.2)
SERVICE CMNT-IMP: NORMAL
SODIUM SERPL-SCNC: 140 MMOL/L (ref 133–143)
WBC # BLD AUTO: 7 K/UL (ref 4.3–11.1)

## 2023-04-18 PROCEDURE — 93005 ELECTROCARDIOGRAM TRACING: CPT

## 2023-04-18 PROCEDURE — 97161 PT EVAL LOW COMPLEX 20 MIN: CPT

## 2023-04-18 PROCEDURE — 85610 PROTHROMBIN TIME: CPT

## 2023-04-18 PROCEDURE — 82040 ASSAY OF SERUM ALBUMIN: CPT

## 2023-04-18 PROCEDURE — 80307 DRUG TEST PRSMV CHEM ANLYZR: CPT

## 2023-04-18 PROCEDURE — 94760 N-INVAS EAR/PLS OXIMETRY 1: CPT

## 2023-04-18 PROCEDURE — 85025 COMPLETE CBC W/AUTO DIFF WBC: CPT

## 2023-04-18 PROCEDURE — 80048 BASIC METABOLIC PNL TOTAL CA: CPT

## 2023-04-18 PROCEDURE — 85730 THROMBOPLASTIN TIME PARTIAL: CPT

## 2023-04-18 PROCEDURE — 83036 HEMOGLOBIN GLYCOSYLATED A1C: CPT

## 2023-04-18 PROCEDURE — 87641 MR-STAPH DNA AMP PROBE: CPT

## 2023-04-18 PROCEDURE — 94664 DEMO&/EVAL PT USE INHALER: CPT

## 2023-04-18 RX ORDER — LEVOCETIRIZINE DIHYDROCHLORIDE 5 MG/1
5 TABLET, FILM COATED ORAL DAILY
COMMUNITY

## 2023-04-18 RX ORDER — CALCIUM CARBONATE 200(500)MG
1 TABLET,CHEWABLE ORAL DAILY
COMMUNITY

## 2023-04-18 RX ORDER — METOPROLOL SUCCINATE 25 MG/1
25 TABLET, EXTENDED RELEASE ORAL DAILY
COMMUNITY

## 2023-04-18 RX ORDER — OLOPATADINE HYDROCHLORIDE 2 MG/ML
1 SOLUTION/ DROPS OPHTHALMIC DAILY
COMMUNITY

## 2023-04-18 RX ORDER — TRAMADOL HYDROCHLORIDE 50 MG/1
50 TABLET ORAL 2 TIMES DAILY
COMMUNITY

## 2023-04-18 ASSESSMENT — PROMIS GLOBAL HEALTH SCALE
IN GENERAL, HOW WOULD YOU RATE YOUR MENTAL HEALTH, INCLUDING YOUR MOOD AND YOUR ABILITY TO THINK [ON A SCALE OF 1 (POOR) TO 5 (EXCELLENT)]?: 3
IN THE PAST 7 DAYS, HOW OFTEN HAVE YOU BEEN BOTHERED BY EMOTIONAL PROBLEMS, SUCH AS FEELING ANXIOUS, DEPRESSED, OR IRRITABLE [ON A SCALE FROM 1 (NEVER) TO 5 (ALWAYS)]?: 2
TO WHAT EXTENT ARE YOU ABLE TO CARRY OUT YOUR EVERYDAY PHYSICAL ACTIVITIES SUCH AS WALKING, CLIMBING STAIRS, CARRYING GROCERIES, OR MOVING A CHAIR [ON A SCALE OF 1 (NOT AT ALL) TO 5 (COMPLETELY)]?: 1
IN THE PAST 7 DAYS, HOW WOULD YOU RATE YOUR FATIGUE ON AVERAGE [ON A SCALE FROM 1 (NONE) TO 5 (VERY SEVERE)]?: 3
IN GENERAL, PLEASE RATE HOW WELL YOU CARRY OUT YOUR USUAL SOCIAL ACTIVITIES (INCLUDES ACTIVITIES AT HOME, AT WORK, AND IN YOUR COMMUNITY, AND RESPONSIBILITIES AS A PARENT, CHILD, SPOUSE, EMPLOYEE, FRIEND, ETC) [ON A SCALE OF 1 (POOR) TO 5 (EXCELLENT)]?: 3
WHO IS THE PERSON COMPLETING THE PROMIS V1.1 SURVEY?: 0
SUM OF RESPONSES TO QUESTIONS 2, 4, 5, & 10: 9
SUM OF RESPONSES TO QUESTIONS 3, 6, 7, & 8: 14
HOW IS THE PROMIS V1.1 BEING ADMINISTERED?: 0
IN THE PAST 7 DAYS, HOW WOULD YOU RATE YOUR PAIN ON AVERAGE [ON A SCALE FROM 0 (NO PAIN) TO 10 (WORST IMAGINABLE PAIN)]?: 8
IN GENERAL, HOW WOULD YOU RATE YOUR PHYSICAL HEALTH [ON A SCALE OF 1 (POOR) TO 5 (EXCELLENT)]?: 2
IN GENERAL, WOULD YOU SAY YOUR HEALTH IS...[ON A SCALE OF 1 (POOR) TO 5 (EXCELLENT)]: 2
IN GENERAL, HOW WOULD YOU RATE YOUR SATISFACTION WITH YOUR SOCIAL ACTIVITIES AND RELATIONSHIPS [ON A SCALE OF 1 (POOR) TO 5 (EXCELLENT)]?: 3
IN GENERAL, WOULD YOU SAY YOUR QUALITY OF LIFE IS...[ON A SCALE OF 1 (POOR) TO 5 (EXCELLENT)]: 1

## 2023-04-18 ASSESSMENT — KOOS JR
HOW SEVERE IS YOUR KNEE STIFFNESS AFTER FIRST WAKING IN MORNING: 4
STANDING UPRIGHT: 3
GOING UP OR DOWN STAIRS: 4
TWISING OR PIVOTING ON KNEE: 4
BENDING TO THE FLOOR TO PICK UP OBJECT: 3
KOOS JR TOTAL INTERVAL SCORE: 28.251
STRAIGHTENING KNEE FULLY: 2
RISING FROM SITTING: 3

## 2023-04-18 ASSESSMENT — PAIN DESCRIPTION - ORIENTATION: ORIENTATION: RIGHT;ANTERIOR;POSTERIOR;OUTER;INNER

## 2023-04-18 ASSESSMENT — PULMONARY FUNCTION TESTS
FEV1 (%PREDICTED): 109
FEV1 (LITERS): 2.66

## 2023-04-18 ASSESSMENT — PAIN DESCRIPTION - LOCATION: LOCATION: KNEE

## 2023-04-18 ASSESSMENT — PAIN SCALES - GENERAL
PAINLEVEL_OUTOF10: 10
PAINLEVEL_OUTOF10: 0

## 2023-04-18 NOTE — PERIOP NOTE
PLEASE CONTINUE TAKING ALL PRESCRIPTION MEDICATIONS UP TO THE DAY OF SURGERY UNLESS OTHERWISE DIRECTED BELOW. DISCONTINUE all vitamins, herbals and supplements 21 days prior to surgery. DISCONTINUE Non-Steriodal Anti-Inflammatory (NSAIDS) such as Advil, Ibuprofen, Motrin, Naproxen and Aleve 5 days prior to surgery. Home Medications to HOLD              (In addition to vitamins, supplements, herbals, NSAID    Hold Diclofenac and Meloxicam 5 days prior to surgery                        Home medications to TAKE the morning of surgery    Tramadol if needed, Breo inhaler, Albuterol inhaler if needed, Gabapentin,    Zofran if needed, Xyzal, Metoprolol, Imitrex if needed, Tizanidine if needed        Comments   The day before surgery please take Tylenol (Acetaminophen) 1000mg in the morning and 1000mg before bed. You may substitute for Tylenol 650 mg.      Drink 2 bottles of Ensure Pre-Surgery the night before surgery and 1 bottle before leaving the house on the morning of surgery. Bring Inhalers, Dynahex wash and Incentive Spirometer with you to hospital on the day of surgery. Please do not bring home medications with you on the day of surgery unless otherwise directed by your nurse. If you are instructed to bring home medications, please give them to your nurse as they will be administered by the nursing staff. If you have any questions, please call Valdez Leach [phone]. A copy of this note was provided to the patient for reference.

## 2023-04-18 NOTE — PERIOP NOTE
The below lab results are within anesthesia guidelines, no follow-up required. Labs automatically routed to ordering provider via Epic documentation.        Latest Reference Range & Units 04/18/23 08:13   Sodium 133 - 143 mmol/L 140   Potassium 3.5 - 5.1 mmol/L 3.8   Chloride 101 - 110 mmol/L 113 (H)   CO2 21 - 32 mmol/L 23   BUN,BUNPL 6 - 23 MG/DL 12   Creatinine 0.6 - 1.0 MG/DL 0.74   Anion Gap 2 - 11 mmol/L 4   Est, Glom Filt Rate >60 ml/min/1.73m2 >60   Glucose, Random 65 - 100 mg/dL 99   CALCIUM, SERUM, 562782 8.3 - 10.4 MG/DL 10.1   Albumin 3.5 - 5.0 g/dL 4.0   Hemoglobin A1C 4.8 - 5.6 % 5.0   eAG (mg/dL) mg/dL 97   WBC 4.3 - 11.1 K/uL 7.0   RBC 4.05 - 5.2 M/uL 4.72   Hemoglobin Quant 11.7 - 15.4 g/dL 14.1   Hematocrit 35.8 - 46.3 % 41.7   MCV 82.0 - 102.0 FL 88.3   MCH 26.1 - 32.9 PG 29.9   MCHC 31.4 - 35.0 g/dL 33.8   MPV 9.4 - 12.3 FL 9.4   RDW 11.9 - 14.6 % 12.1   Platelet Count 391 - 450 K/uL 273   Absolute Mono # 0.1 - 1.3 K/UL 0.4   Eosinophils % 0.5 - 7.8 % 2   Basophils Absolute 0.0 - 0.2 K/UL 0.1   Differential Type -   AUTOMATED   Seg Neutrophils 43 - 78 % 57   Segs Absolute 1.7 - 8.2 K/UL 3.9   Lymphocytes 13 - 44 % 34   Absolute Lymph # 0.5 - 4.6 K/UL 2.4   Monocytes 4.0 - 12.0 % 6   Absolute Eos # 0.0 - 0.8 K/UL 0.1   Basophils 0.0 - 2.0 % 1   Immature Granulocytes 0.0 - 5.0 % 1   Nucleated Red Blood Cells 0.0 - 0.2 K/uL 0.00   Absolute Immature Granulocyte 0.0 - 0.5 K/UL 0.1   Prothrombin Time 12.6 - 14.3 sec 12.1 (L)   INR -   0.9   PTT 24.5 - 34.2 SEC 31.2   MSSA/MRSA SCREEN BY PCR  Rpt   (H): Data is abnormally high  (L): Data is abnormally low  Rpt: View report in Results Review for more information

## 2023-04-18 NOTE — CARE COORDINATION
[facility] Case Management note:  Patient screened in Prehab for discharge planning needs. Patient scheduled for a future total joint replacement. We discussed Home Health and equipment needed after surgery. List of Home Health providers offered. Patient w/o preference towards provider. Pt lives in [facility]. She chose [facility] as the provider. We will send [facility] referral on the day of surgery. Pt will need a walker and BSC.

## 2023-04-18 NOTE — PERIOP NOTE
Patient verified name and . Order for consent found in EHR and matches case posting; patient verified. Type 3 surgery, joint assessment complete. Labs per surgeon: CBC,BMP, PT/PTT, HgbA1c, Albumin, Nicotine; results pending. Labs per anesthesia protocol: no additional  EKG: Completed today; results within anesthesia limits. Patient has been referred to cardiology by her PCP due to SVT, pt states she cx appointment due to upcoming surgery. Pt has hx of bladder cancer, liver tumor, and gastric ulcers--pt's pcp has recommended pt establish care with GI and Urology. Anesthesia to review and determine if pt needs to see be seen by any specialist prior to sx. Charge nurse to f/u. MRSA/MSSA swab collected; pharmacy to review and dose antibiotic as appropriate. Hospital approved surgical skin cleanser and instructions to return bottle on DOS given per hospital policy. Patient provided with handouts including Guide to Surgery, Pain Management, Hand Hygiene, Blood Transfusion Education, and [facility] Brochure. Patient answered medical/surgical history questions at their best of ability. All prior to admission medications documented in [facility]. Original medication prescription bottle NOT visualized during patient appointment. Patient instructed to hold all vitamins, supplements, herbals 3 weeks prior to surgery and NSAIDS 5 days prior to surgery. Patient teach back successful and patient demonstrates knowledge of instruction.

## 2023-04-18 NOTE — PROGRESS NOTES
be carried out by a therapist or under their direction.   Thank you for this referral,  Hugo Sanchez, PT

## 2023-04-19 LAB
COMMENT:: NORMAL
COTININE UR QL SCN: NEGATIVE NG/ML

## 2023-04-20 NOTE — PROGRESS NOTES
Nicotine Metabolites, Urine  Order: [id number]  Status: Final result     Visible to patient: Yes (not seen)     Next appt: 05/19/2023 at 08:00 AM in Orthopedic Surgery Lary Joel MD)     Dx: Primary osteoarthritis of right knee     0 Result Notes        Component Ref Range & Units 4/18/23 0813 Resulting Agency   Cotinine Screen, Ur Pupdlc=056 ng/mL Negative  [facility]   Comment:   Comment  [facility]   Comment: (NOTE)   This analysis is performed by immunoassay. Positive findings are   unconfirmed analytical test results; if results do not support   expected clinical finding, confirmation by an alternate methodology   is recommended. Patient metabolic variables, specific drug chemistry,   and specimen characteristics can affect test outcome. Technical consultation is available at [email or web address], or   call toll free [phone].    Performed At: [facility] & [address] [id number]   Julito Aguilera MD DK:[id number]   Performed At: [facility] [place] [facility]   Harshil [address] [place] [id number]   Martin Keith PhD ZC:[id number]               Specimen Collected: 04/18/23 08:13 EDT Last Resulted: 04/19/23 19:35 EDT
HX OF SVT  MOM HAS LAURA,DANGERS OF UNTREATED LAURA EXPLAINED TO PT.                                          SLEEPS ON SIDE         PHYSICAL EXAM   Body mass index is 30.41 kg/m². Vitals:    04/18/23 0815   BP:    Pulse: (P) 94   Resp:    Temp:    SpO2: (P) 100%     Neck circumference:    36.5  cm    Loud snoring:                                                 YES            Witnessed apnea or wakening gasping or choking:        DENIES         Awakens with headaches:                                               DENIES  Morning or daytime tiredness/ sleepiness:                                TIRED  Dry mouth or sore throat in morning:                      DENIES                                   Guerra stage:  4                                   SACS score:4  Stop Bang                                       CS HS  RESPIRATORY ASSESSMENT Q SHIFT   O2 PRN    ALBUTEROL  NEBULIZER Q6 PRN WHEEZING                                        Referrals:  [facility]  Pt.  Phone Number:

## 2023-04-25 ENCOUNTER — CLINICAL DOCUMENTATION (OUTPATIENT)
Dept: ORTHOPEDIC SURGERY | Age: 43
End: 2023-04-25

## 2023-04-27 DIAGNOSIS — M17.11 PRIMARY OSTEOARTHRITIS OF RIGHT KNEE: Primary | ICD-10-CM

## 2023-04-27 RX ORDER — TRAMADOL HYDROCHLORIDE 50 MG/1
50 TABLET ORAL EVERY 4 HOURS PRN
Qty: 42 TABLET | Refills: 0 | Status: SHIPPED | OUTPATIENT
Start: 2023-04-27 | End: 2023-05-04

## 2023-04-28 ENCOUNTER — CLINICAL DOCUMENTATION (OUTPATIENT)
Dept: ORTHOPEDIC SURGERY | Age: 43
End: 2023-04-28

## 2023-04-30 DIAGNOSIS — G89.18 POSTOPERATIVE PAIN: Primary | ICD-10-CM

## 2023-04-30 RX ORDER — TIZANIDINE 4 MG/1
4 TABLET ORAL 3 TIMES DAILY
Qty: 30 TABLET | Refills: 1 | Status: SHIPPED | OUTPATIENT
Start: 2023-04-30

## 2023-04-30 RX ORDER — MELOXICAM 15 MG/1
15 TABLET ORAL DAILY
Qty: 30 TABLET | Refills: 2 | Status: SHIPPED | OUTPATIENT
Start: 2023-04-30

## 2023-04-30 RX ORDER — ZOLPIDEM TARTRATE 10 MG/1
5 TABLET ORAL NIGHTLY PRN
Qty: 30 TABLET | Refills: 0 | Status: SHIPPED | OUTPATIENT
Start: 2023-04-30 | End: 2023-05-30

## 2023-04-30 RX ORDER — SENNA PLUS 8.6 MG/1
1 TABLET ORAL 2 TIMES DAILY
Qty: 30 TABLET | Refills: 2 | Status: SHIPPED | OUTPATIENT
Start: 2023-04-30

## 2023-04-30 RX ORDER — OMEPRAZOLE 40 MG/1
40 CAPSULE, DELAYED RELEASE ORAL DAILY
Qty: 30 CAPSULE | Refills: 0 | Status: SHIPPED | OUTPATIENT
Start: 2023-04-30

## 2023-04-30 RX ORDER — ONDANSETRON 4 MG/1
4 TABLET, FILM COATED ORAL 3 TIMES DAILY PRN
Qty: 30 TABLET | Refills: 1 | Status: SHIPPED | OUTPATIENT
Start: 2023-04-30

## 2023-04-30 RX ORDER — HYDROMORPHONE HYDROCHLORIDE 2 MG/1
2 TABLET ORAL EVERY 4 HOURS PRN
Qty: 40 TABLET | Refills: 0 | Status: SHIPPED | OUTPATIENT
Start: 2023-04-30 | End: 2023-05-07

## 2023-04-30 RX ORDER — ACETAMINOPHEN 500 MG
1000 TABLET ORAL EVERY 6 HOURS PRN
Qty: 180 TABLET | Refills: 2 | Status: SHIPPED | OUTPATIENT
Start: 2023-04-30

## 2023-04-30 RX ORDER — GABAPENTIN 300 MG/1
300 CAPSULE ORAL 2 TIMES DAILY
Qty: 30 CAPSULE | Refills: 0 | Status: SHIPPED | OUTPATIENT
Start: 2023-04-30 | End: 2023-05-15

## 2023-04-30 RX ORDER — ASPIRIN 81 MG/1
81 TABLET ORAL 2 TIMES DAILY
Qty: 60 TABLET | Refills: 0 | Status: SHIPPED | OUTPATIENT
Start: 2023-04-30

## 2023-05-09 ENCOUNTER — ANESTHESIA EVENT (OUTPATIENT)
Dept: SURGERY | Age: 43
End: 2023-05-09
Payer: COMMERCIAL

## 2023-05-10 ENCOUNTER — HOSPITAL ENCOUNTER (OUTPATIENT)
Age: 43
Discharge: HOME | End: 2023-05-11
Attending: ORTHOPAEDIC SURGERY | Admitting: ORTHOPAEDIC SURGERY
Payer: COMMERCIAL

## 2023-05-10 ENCOUNTER — ANESTHESIA (OUTPATIENT)
Dept: SURGERY | Age: 43
End: 2023-05-10
Payer: COMMERCIAL

## 2023-05-10 DIAGNOSIS — M21.961 ACQUIRED DEFORMITY OF RIGHT KNEE: ICD-10-CM

## 2023-05-10 DIAGNOSIS — M17.11 PRIMARY OSTEOARTHRITIS OF RIGHT KNEE: ICD-10-CM

## 2023-05-10 DIAGNOSIS — G56.02 LEFT CARPAL TUNNEL SYNDROME: ICD-10-CM

## 2023-05-10 PROCEDURE — 2580000003 HC RX 258: Performed by: NURSE PRACTITIONER

## 2023-05-10 PROCEDURE — 97530 THERAPEUTIC ACTIVITIES: CPT

## 2023-05-10 PROCEDURE — 6360000002 HC RX W HCPCS: Performed by: ANESTHESIOLOGY

## 2023-05-10 PROCEDURE — 97535 SELF CARE MNGMENT TRAINING: CPT

## 2023-05-10 PROCEDURE — 94761 N-INVAS EAR/PLS OXIMETRY MLT: CPT

## 2023-05-10 PROCEDURE — 6370000000 HC RX 637 (ALT 250 FOR IP): Performed by: ANESTHESIOLOGY

## 2023-05-10 PROCEDURE — 97165 OT EVAL LOW COMPLEX 30 MIN: CPT

## 2023-05-10 PROCEDURE — 6370000000 HC RX 637 (ALT 250 FOR IP): Performed by: NURSE PRACTITIONER

## 2023-05-10 PROCEDURE — 2720000010 HC SURG SUPPLY STERILE: Performed by: ORTHOPAEDIC SURGERY

## 2023-05-10 PROCEDURE — 2709999900 HC NON-CHARGEABLE SUPPLY: Performed by: ORTHOPAEDIC SURGERY

## 2023-05-10 PROCEDURE — 3700000001 HC ADD 15 MINUTES (ANESTHESIA): Performed by: ORTHOPAEDIC SURGERY

## 2023-05-10 PROCEDURE — 3600000005 HC SURGERY LEVEL 5 BASE: Performed by: ORTHOPAEDIC SURGERY

## 2023-05-10 PROCEDURE — 7100000001 HC PACU RECOVERY - ADDTL 15 MIN: Performed by: ORTHOPAEDIC SURGERY

## 2023-05-10 PROCEDURE — 2580000003 HC RX 258: Performed by: NURSE ANESTHETIST, CERTIFIED REGISTERED

## 2023-05-10 PROCEDURE — 3700000000 HC ANESTHESIA ATTENDED CARE: Performed by: ORTHOPAEDIC SURGERY

## 2023-05-10 PROCEDURE — 97161 PT EVAL LOW COMPLEX 20 MIN: CPT

## 2023-05-10 PROCEDURE — 7100000000 HC PACU RECOVERY - FIRST 15 MIN: Performed by: ORTHOPAEDIC SURGERY

## 2023-05-10 PROCEDURE — 2500000003 HC RX 250 WO HCPCS: Performed by: ANESTHESIOLOGY

## 2023-05-10 PROCEDURE — 3600000015 HC SURGERY LEVEL 5 ADDTL 15MIN: Performed by: ORTHOPAEDIC SURGERY

## 2023-05-10 PROCEDURE — C1713 ANCHOR/SCREW BN/BN,TIS/BN: HCPCS | Performed by: ORTHOPAEDIC SURGERY

## 2023-05-10 PROCEDURE — 6360000002 HC RX W HCPCS: Performed by: NURSE PRACTITIONER

## 2023-05-10 PROCEDURE — 6360000002 HC RX W HCPCS: Performed by: NURSE ANESTHETIST, CERTIFIED REGISTERED

## 2023-05-10 PROCEDURE — 64447 NJX AA&/STRD FEMORAL NRV IMG: CPT | Performed by: ANESTHESIOLOGY

## 2023-05-10 PROCEDURE — 2580000003 HC RX 258: Performed by: ANESTHESIOLOGY

## 2023-05-10 PROCEDURE — 94640 AIRWAY INHALATION TREATMENT: CPT

## 2023-05-10 PROCEDURE — 2500000003 HC RX 250 WO HCPCS: Performed by: NURSE ANESTHETIST, CERTIFIED REGISTERED

## 2023-05-10 PROCEDURE — 6360000002 HC RX W HCPCS: Performed by: ORTHOPAEDIC SURGERY

## 2023-05-10 PROCEDURE — C1776 JOINT DEVICE (IMPLANTABLE): HCPCS | Performed by: ORTHOPAEDIC SURGERY

## 2023-05-10 PROCEDURE — 94760 N-INVAS EAR/PLS OXIMETRY 1: CPT

## 2023-05-10 PROCEDURE — 6370000000 HC RX 637 (ALT 250 FOR IP): Performed by: ORTHOPAEDIC SURGERY

## 2023-05-10 DEVICE — CRUCIATE RETAINING FEMORAL
Type: IMPLANTABLE DEVICE | Site: KNEE | Status: FUNCTIONAL
Brand: TRIATHLON

## 2023-05-10 DEVICE — COMPONENT TOT KNEE CAPPED PRIMARY K2STRYKER] STRYKER CORP]: Type: IMPLANTABLE DEVICE | Status: FUNCTIONAL

## 2023-05-10 DEVICE — TIBIAL BEARING INSERT - CS
Type: IMPLANTABLE DEVICE | Site: KNEE | Status: FUNCTIONAL
Brand: TRIATHLON

## 2023-05-10 DEVICE — PATELLA
Type: IMPLANTABLE DEVICE | Site: KNEE | Status: FUNCTIONAL
Brand: TRIATHLON

## 2023-05-10 DEVICE — TIBIAL COMPONENT
Type: IMPLANTABLE DEVICE | Site: KNEE | Status: FUNCTIONAL
Brand: TRIATHLON

## 2023-05-10 RX ORDER — ACETAMINOPHEN 500 MG
1000 TABLET ORAL EVERY 6 HOURS
Status: DISCONTINUED | OUTPATIENT
Start: 2023-05-10 | End: 2023-05-11 | Stop reason: HOSPADM

## 2023-05-10 RX ORDER — SODIUM CHLORIDE 9 MG/ML
INJECTION, SOLUTION INTRAVENOUS PRN
Status: DISCONTINUED | OUTPATIENT
Start: 2023-05-10 | End: 2023-05-11 | Stop reason: HOSPADM

## 2023-05-10 RX ORDER — SODIUM CHLORIDE 0.9 % (FLUSH) 0.9 %
5-40 SYRINGE (ML) INJECTION PRN
Status: DISCONTINUED | OUTPATIENT
Start: 2023-05-10 | End: 2023-05-10 | Stop reason: HOSPADM

## 2023-05-10 RX ORDER — SODIUM CHLORIDE 9 MG/ML
INJECTION, SOLUTION INTRAVENOUS PRN
Status: DISCONTINUED | OUTPATIENT
Start: 2023-05-10 | End: 2023-05-10 | Stop reason: HOSPADM

## 2023-05-10 RX ORDER — SODIUM CHLORIDE 0.9 % (FLUSH) 0.9 %
5-40 SYRINGE (ML) INJECTION PRN
Status: DISCONTINUED | OUTPATIENT
Start: 2023-05-10 | End: 2023-05-11 | Stop reason: HOSPADM

## 2023-05-10 RX ORDER — ROPIVACAINE HYDROCHLORIDE 2 MG/ML
INJECTION, SOLUTION EPIDURAL; INFILTRATION; PERINEURAL
Status: COMPLETED | OUTPATIENT
Start: 2023-05-10 | End: 2023-05-10

## 2023-05-10 RX ORDER — ONDANSETRON 4 MG/1
4 TABLET, ORALLY DISINTEGRATING ORAL EVERY 8 HOURS PRN
Status: DISCONTINUED | OUTPATIENT
Start: 2023-05-10 | End: 2023-05-11 | Stop reason: HOSPADM

## 2023-05-10 RX ORDER — SODIUM CHLORIDE, SODIUM LACTATE, POTASSIUM CHLORIDE, CALCIUM CHLORIDE 600; 310; 30; 20 MG/100ML; MG/100ML; MG/100ML; MG/100ML
INJECTION, SOLUTION INTRAVENOUS CONTINUOUS PRN
Status: DISCONTINUED | OUTPATIENT
Start: 2023-05-10 | End: 2023-05-10 | Stop reason: SDUPTHER

## 2023-05-10 RX ORDER — DEXAMETHASONE SODIUM PHOSPHATE 10 MG/ML
10 INJECTION INTRAMUSCULAR; INTRAVENOUS ONCE
Status: COMPLETED | OUTPATIENT
Start: 2023-05-11 | End: 2023-05-11

## 2023-05-10 RX ORDER — DEXTROSE MONOHYDRATE 100 MG/ML
INJECTION, SOLUTION INTRAVENOUS CONTINUOUS PRN
Status: DISCONTINUED | OUTPATIENT
Start: 2023-05-10 | End: 2023-05-10 | Stop reason: HOSPADM

## 2023-05-10 RX ORDER — SUMATRIPTAN 50 MG/1
50 TABLET, FILM COATED ORAL DAILY PRN
Status: DISCONTINUED | OUTPATIENT
Start: 2023-05-10 | End: 2023-05-11 | Stop reason: HOSPADM

## 2023-05-10 RX ORDER — ONDANSETRON 4 MG/1
8 TABLET, ORALLY DISINTEGRATING ORAL EVERY 8 HOURS PRN
Status: DISCONTINUED | OUTPATIENT
Start: 2023-05-10 | End: 2023-05-11 | Stop reason: HOSPADM

## 2023-05-10 RX ORDER — OXYCODONE HYDROCHLORIDE 5 MG/1
5 TABLET ORAL
Status: COMPLETED | OUTPATIENT
Start: 2023-05-10 | End: 2023-05-10

## 2023-05-10 RX ORDER — ZOLPIDEM TARTRATE 5 MG/1
5 TABLET ORAL NIGHTLY PRN
Status: DISCONTINUED | OUTPATIENT
Start: 2023-05-10 | End: 2023-05-11 | Stop reason: HOSPADM

## 2023-05-10 RX ORDER — MELOXICAM 7.5 MG/1
7.5 TABLET ORAL 2 TIMES DAILY
Status: DISCONTINUED | OUTPATIENT
Start: 2023-05-13 | End: 2023-05-11 | Stop reason: HOSPADM

## 2023-05-10 RX ORDER — TRANEXAMIC ACID 650 MG/1
1300 TABLET ORAL
Status: DISPENSED | OUTPATIENT
Start: 2023-05-10 | End: 2023-05-10

## 2023-05-10 RX ORDER — TIZANIDINE 2 MG/1
8 TABLET ORAL NIGHTLY
Status: DISCONTINUED | OUTPATIENT
Start: 2023-05-10 | End: 2023-05-11 | Stop reason: HOSPADM

## 2023-05-10 RX ORDER — FENTANYL CITRATE 50 UG/ML
100 INJECTION, SOLUTION INTRAMUSCULAR; INTRAVENOUS
Status: COMPLETED | OUTPATIENT
Start: 2023-05-10 | End: 2023-05-10

## 2023-05-10 RX ORDER — OXYCODONE HCL 10 MG/1
10 TABLET, FILM COATED, EXTENDED RELEASE ORAL EVERY 12 HOURS SCHEDULED
Status: DISCONTINUED | OUTPATIENT
Start: 2023-05-10 | End: 2023-05-11 | Stop reason: HOSPADM

## 2023-05-10 RX ORDER — SODIUM CHLORIDE 0.9 % (FLUSH) 0.9 %
5-40 SYRINGE (ML) INJECTION EVERY 12 HOURS SCHEDULED
Status: DISCONTINUED | OUTPATIENT
Start: 2023-05-10 | End: 2023-05-11 | Stop reason: HOSPADM

## 2023-05-10 RX ORDER — APREPITANT 40 MG/1
40 CAPSULE ORAL ONCE
Status: COMPLETED | OUTPATIENT
Start: 2023-05-10 | End: 2023-05-10

## 2023-05-10 RX ORDER — MONTELUKAST SODIUM 10 MG/1
10 TABLET ORAL NIGHTLY
Status: DISCONTINUED | OUTPATIENT
Start: 2023-05-10 | End: 2023-05-11 | Stop reason: HOSPADM

## 2023-05-10 RX ORDER — PROCHLORPERAZINE EDISYLATE 5 MG/ML
5 INJECTION INTRAMUSCULAR; INTRAVENOUS
Status: COMPLETED | OUTPATIENT
Start: 2023-05-10 | End: 2023-05-11

## 2023-05-10 RX ORDER — LIDOCAINE HYDROCHLORIDE 20 MG/ML
INJECTION, SOLUTION EPIDURAL; INFILTRATION; INTRACAUDAL; PERINEURAL PRN
Status: DISCONTINUED | OUTPATIENT
Start: 2023-05-10 | End: 2023-05-10 | Stop reason: SDUPTHER

## 2023-05-10 RX ORDER — ROPIVACAINE HYDROCHLORIDE 2 MG/ML
INJECTION, SOLUTION EPIDURAL; INFILTRATION; PERINEURAL PRN
Status: DISCONTINUED | OUTPATIENT
Start: 2023-05-10 | End: 2023-05-10 | Stop reason: ALTCHOICE

## 2023-05-10 RX ORDER — EPHEDRINE SULFATE/0.9% NACL/PF 50 MG/5 ML
SYRINGE (ML) INTRAVENOUS PRN
Status: DISCONTINUED | OUTPATIENT
Start: 2023-05-10 | End: 2023-05-10 | Stop reason: SDUPTHER

## 2023-05-10 RX ORDER — SENNA AND DOCUSATE SODIUM 50; 8.6 MG/1; MG/1
1 TABLET, FILM COATED ORAL 2 TIMES DAILY
Status: DISCONTINUED | OUTPATIENT
Start: 2023-05-10 | End: 2023-05-11 | Stop reason: HOSPADM

## 2023-05-10 RX ORDER — SODIUM CHLORIDE 0.9 % (FLUSH) 0.9 %
5-40 SYRINGE (ML) INJECTION EVERY 12 HOURS SCHEDULED
Status: DISCONTINUED | OUTPATIENT
Start: 2023-05-10 | End: 2023-05-10 | Stop reason: HOSPADM

## 2023-05-10 RX ORDER — DIPHENHYDRAMINE HYDROCHLORIDE 50 MG/ML
12.5 INJECTION INTRAMUSCULAR; INTRAVENOUS
Status: DISCONTINUED | OUTPATIENT
Start: 2023-05-10 | End: 2023-05-10 | Stop reason: HOSPADM

## 2023-05-10 RX ORDER — LIDOCAINE HYDROCHLORIDE 10 MG/ML
1 INJECTION, SOLUTION INFILTRATION; PERINEURAL
Status: DISCONTINUED | OUTPATIENT
Start: 2023-05-10 | End: 2023-05-10 | Stop reason: HOSPADM

## 2023-05-10 RX ORDER — HYDROMORPHONE HYDROCHLORIDE 1 MG/ML
1 INJECTION, SOLUTION INTRAMUSCULAR; INTRAVENOUS; SUBCUTANEOUS
Status: DISCONTINUED | OUTPATIENT
Start: 2023-05-10 | End: 2023-05-11 | Stop reason: HOSPADM

## 2023-05-10 RX ORDER — ASPIRIN 81 MG/1
81 TABLET ORAL 2 TIMES DAILY
Status: DISCONTINUED | OUTPATIENT
Start: 2023-05-10 | End: 2023-05-11 | Stop reason: HOSPADM

## 2023-05-10 RX ORDER — GABAPENTIN 300 MG/1
900 CAPSULE ORAL NIGHTLY
Status: DISCONTINUED | OUTPATIENT
Start: 2023-05-10 | End: 2023-05-11 | Stop reason: HOSPADM

## 2023-05-10 RX ORDER — KETOROLAC TROMETHAMINE 15 MG/ML
15 INJECTION, SOLUTION INTRAMUSCULAR; INTRAVENOUS EVERY 8 HOURS
Status: DISCONTINUED | OUTPATIENT
Start: 2023-05-10 | End: 2023-05-11 | Stop reason: HOSPADM

## 2023-05-10 RX ORDER — HYDROMORPHONE HYDROCHLORIDE 2 MG/1
2 TABLET ORAL EVERY 4 HOURS PRN
Status: DISCONTINUED | OUTPATIENT
Start: 2023-05-10 | End: 2023-05-11 | Stop reason: HOSPADM

## 2023-05-10 RX ORDER — CETIRIZINE HYDROCHLORIDE 10 MG/1
10 TABLET ORAL DAILY PRN
Status: DISCONTINUED | OUTPATIENT
Start: 2023-05-10 | End: 2023-05-11 | Stop reason: HOSPADM

## 2023-05-10 RX ORDER — IBUPROFEN 600 MG/1
1 TABLET ORAL PRN
Status: DISCONTINUED | OUTPATIENT
Start: 2023-05-10 | End: 2023-05-10 | Stop reason: HOSPADM

## 2023-05-10 RX ORDER — GABAPENTIN 300 MG/1
600 CAPSULE ORAL DAILY
Status: DISCONTINUED | OUTPATIENT
Start: 2023-05-11 | End: 2023-05-11 | Stop reason: HOSPADM

## 2023-05-10 RX ORDER — DIPHENHYDRAMINE HCL 25 MG
25 CAPSULE ORAL EVERY 6 HOURS PRN
Status: DISCONTINUED | OUTPATIENT
Start: 2023-05-10 | End: 2023-05-11 | Stop reason: HOSPADM

## 2023-05-10 RX ORDER — MIDAZOLAM HYDROCHLORIDE 2 MG/2ML
2 INJECTION, SOLUTION INTRAMUSCULAR; INTRAVENOUS
Status: COMPLETED | OUTPATIENT
Start: 2023-05-10 | End: 2023-05-10

## 2023-05-10 RX ORDER — PROPOFOL 10 MG/ML
INJECTION, EMULSION INTRAVENOUS PRN
Status: DISCONTINUED | OUTPATIENT
Start: 2023-05-10 | End: 2023-05-10 | Stop reason: SDUPTHER

## 2023-05-10 RX ORDER — TRANEXAMIC ACID 100 MG/ML
INJECTION, SOLUTION INTRAVENOUS PRN
Status: DISCONTINUED | OUTPATIENT
Start: 2023-05-10 | End: 2023-05-10 | Stop reason: SDUPTHER

## 2023-05-10 RX ORDER — DEXMEDETOMIDINE HYDROCHLORIDE 100 UG/ML
INJECTION, SOLUTION INTRAVENOUS PRN
Status: DISCONTINUED | OUTPATIENT
Start: 2023-05-10 | End: 2023-05-10 | Stop reason: SDUPTHER

## 2023-05-10 RX ORDER — KETOROLAC TROMETHAMINE 30 MG/ML
INJECTION, SOLUTION INTRAMUSCULAR; INTRAVENOUS PRN
Status: DISCONTINUED | OUTPATIENT
Start: 2023-05-10 | End: 2023-05-10 | Stop reason: ALTCHOICE

## 2023-05-10 RX ORDER — ONDANSETRON 2 MG/ML
4 INJECTION INTRAMUSCULAR; INTRAVENOUS EVERY 6 HOURS PRN
Status: DISCONTINUED | OUTPATIENT
Start: 2023-05-10 | End: 2023-05-11 | Stop reason: HOSPADM

## 2023-05-10 RX ORDER — HALOPERIDOL 5 MG/ML
1 INJECTION INTRAMUSCULAR
Status: DISCONTINUED | OUTPATIENT
Start: 2023-05-10 | End: 2023-05-10 | Stop reason: HOSPADM

## 2023-05-10 RX ORDER — SODIUM CHLORIDE 9 MG/ML
INJECTION, SOLUTION INTRAVENOUS CONTINUOUS
Status: DISCONTINUED | OUTPATIENT
Start: 2023-05-10 | End: 2023-05-11 | Stop reason: HOSPADM

## 2023-05-10 RX ORDER — PANTOPRAZOLE SODIUM 40 MG/1
40 TABLET, DELAYED RELEASE ORAL
Status: DISCONTINUED | OUTPATIENT
Start: 2023-05-11 | End: 2023-05-11 | Stop reason: HOSPADM

## 2023-05-10 RX ORDER — METOPROLOL SUCCINATE 50 MG/1
50 TABLET, EXTENDED RELEASE ORAL DAILY
Status: DISCONTINUED | OUTPATIENT
Start: 2023-05-11 | End: 2023-05-11 | Stop reason: HOSPADM

## 2023-05-10 RX ORDER — DIPHENHYDRAMINE HYDROCHLORIDE 50 MG/ML
25 INJECTION INTRAMUSCULAR; INTRAVENOUS EVERY 6 HOURS PRN
Status: DISCONTINUED | OUTPATIENT
Start: 2023-05-10 | End: 2023-05-11 | Stop reason: HOSPADM

## 2023-05-10 RX ORDER — ACETAMINOPHEN 500 MG
1000 TABLET ORAL ONCE
Status: DISCONTINUED | OUTPATIENT
Start: 2023-05-10 | End: 2023-05-10 | Stop reason: HOSPADM

## 2023-05-10 RX ORDER — NALOXONE HYDROCHLORIDE 0.4 MG/ML
0.4 INJECTION, SOLUTION INTRAMUSCULAR; INTRAVENOUS; SUBCUTANEOUS PRN
Status: DISCONTINUED | OUTPATIENT
Start: 2023-05-10 | End: 2023-05-11 | Stop reason: HOSPADM

## 2023-05-10 RX ORDER — ALBUTEROL SULFATE 2.5 MG/3ML
2.5 SOLUTION RESPIRATORY (INHALATION) EVERY 6 HOURS PRN
Status: DISCONTINUED | OUTPATIENT
Start: 2023-05-10 | End: 2023-05-11 | Stop reason: HOSPADM

## 2023-05-10 RX ORDER — SODIUM CHLORIDE, SODIUM LACTATE, POTASSIUM CHLORIDE, CALCIUM CHLORIDE 600; 310; 30; 20 MG/100ML; MG/100ML; MG/100ML; MG/100ML
INJECTION, SOLUTION INTRAVENOUS CONTINUOUS
Status: DISCONTINUED | OUTPATIENT
Start: 2023-05-10 | End: 2023-05-10 | Stop reason: HOSPADM

## 2023-05-10 RX ORDER — AMITRIPTYLINE HYDROCHLORIDE 10 MG/1
20 TABLET, FILM COATED ORAL NIGHTLY
Status: DISCONTINUED | OUTPATIENT
Start: 2023-05-10 | End: 2023-05-11 | Stop reason: HOSPADM

## 2023-05-10 RX ORDER — GABAPENTIN 300 MG/1
600 CAPSULE ORAL 3 TIMES DAILY
Status: DISCONTINUED | OUTPATIENT
Start: 2023-05-10 | End: 2023-05-10 | Stop reason: SDUPTHER

## 2023-05-10 RX ORDER — SODIUM CHLORIDE, SODIUM LACTATE, POTASSIUM CHLORIDE, CALCIUM CHLORIDE 600; 310; 30; 20 MG/100ML; MG/100ML; MG/100ML; MG/100ML
INJECTION, SOLUTION INTRAVENOUS CONTINUOUS
Status: DISCONTINUED | OUTPATIENT
Start: 2023-05-10 | End: 2023-05-11 | Stop reason: HOSPADM

## 2023-05-10 RX ORDER — KETOTIFEN FUMARATE 0.35 MG/ML
1 SOLUTION/ DROPS OPHTHALMIC 2 TIMES DAILY
Status: DISCONTINUED | OUTPATIENT
Start: 2023-05-11 | End: 2023-05-11 | Stop reason: HOSPADM

## 2023-05-10 RX ORDER — AMITRIPTYLINE HYDROCHLORIDE 10 MG/1
20 TABLET, FILM COATED ORAL NIGHTLY
Status: DISCONTINUED | OUTPATIENT
Start: 2023-05-10 | End: 2023-05-10 | Stop reason: SDUPTHER

## 2023-05-10 RX ORDER — PROPOFOL 10 MG/ML
INJECTION, EMULSION INTRAVENOUS CONTINUOUS PRN
Status: DISCONTINUED | OUTPATIENT
Start: 2023-05-10 | End: 2023-05-10 | Stop reason: SDUPTHER

## 2023-05-10 RX ADMIN — HYDROMORPHONE HYDROCHLORIDE 0.5 MG: 1 INJECTION, SOLUTION INTRAMUSCULAR; INTRAVENOUS; SUBCUTANEOUS at 12:34

## 2023-05-10 RX ADMIN — KETOROLAC TROMETHAMINE 15 MG: 15 INJECTION, SOLUTION INTRAMUSCULAR; INTRAVENOUS at 19:23

## 2023-05-10 RX ADMIN — TRANEXAMIC ACID 1300 MG: 650 TABLET ORAL at 15:08

## 2023-05-10 RX ADMIN — DEXMEDETOMIDINE 10 MCG: 100 INJECTION, SOLUTION, CONCENTRATE INTRAVENOUS at 10:22

## 2023-05-10 RX ADMIN — FENTANYL CITRATE 100 MCG: 50 INJECTION INTRAMUSCULAR; INTRAVENOUS at 09:47

## 2023-05-10 RX ADMIN — TIZANIDINE 8 MG: 2 TABLET ORAL at 20:59

## 2023-05-10 RX ADMIN — PROPOFOL 20 MG: 10 INJECTION, EMULSION INTRAVENOUS at 10:20

## 2023-05-10 RX ADMIN — HYDROMORPHONE HYDROCHLORIDE 0.5 MG: 1 INJECTION, SOLUTION INTRAMUSCULAR; INTRAVENOUS; SUBCUTANEOUS at 12:39

## 2023-05-10 RX ADMIN — SENNOSIDES AND DOCUSATE SODIUM 1 TABLET: 50; 8.6 TABLET ORAL at 20:59

## 2023-05-10 RX ADMIN — ONDANSETRON 4 MG: 4 TABLET, ORALLY DISINTEGRATING ORAL at 17:29

## 2023-05-10 RX ADMIN — SODIUM CHLORIDE, PRESERVATIVE FREE 10 ML: 5 INJECTION INTRAVENOUS at 20:56

## 2023-05-10 RX ADMIN — SODIUM CHLORIDE, SODIUM LACTATE, POTASSIUM CHLORIDE, AND CALCIUM CHLORIDE: 600; 310; 30; 20 INJECTION, SOLUTION INTRAVENOUS at 10:45

## 2023-05-10 RX ADMIN — Medication 25 MG: at 10:30

## 2023-05-10 RX ADMIN — ZOLPIDEM TARTRATE 5 MG: 5 TABLET ORAL at 22:54

## 2023-05-10 RX ADMIN — Medication 20 MG: at 11:20

## 2023-05-10 RX ADMIN — PHENYLEPHRINE HYDROCHLORIDE 200 MCG: 0.1 INJECTION, SOLUTION INTRAVENOUS at 10:28

## 2023-05-10 RX ADMIN — ASPIRIN 81 MG: 81 TABLET, COATED ORAL at 20:59

## 2023-05-10 RX ADMIN — AMITRIPTYLINE HYDROCHLORIDE 20 MG: 10 TABLET, FILM COATED ORAL at 20:59

## 2023-05-10 RX ADMIN — DEXAMETHASONE SODIUM PHOSPHATE 4 MG: 4 INJECTION, SOLUTION INTRAMUSCULAR; INTRAVENOUS at 09:45

## 2023-05-10 RX ADMIN — MONTELUKAST 10 MG: 10 TABLET, FILM COATED ORAL at 20:59

## 2023-05-10 RX ADMIN — LIDOCAINE HYDROCHLORIDE 100 MG: 20 INJECTION, SOLUTION EPIDURAL; INFILTRATION; INTRACAUDAL; PERINEURAL at 10:25

## 2023-05-10 RX ADMIN — TRANEXAMIC ACID 1000 MG: 100 INJECTION, SOLUTION INTRAVENOUS at 10:30

## 2023-05-10 RX ADMIN — HYDROMORPHONE HYDROCHLORIDE 2 MG: 2 TABLET ORAL at 23:52

## 2023-05-10 RX ADMIN — PHENYLEPHRINE HYDROCHLORIDE 100 MCG: 0.1 INJECTION, SOLUTION INTRAVENOUS at 10:56

## 2023-05-10 RX ADMIN — PROCHLORPERAZINE EDISYLATE 5 MG: 5 INJECTION INTRAMUSCULAR; INTRAVENOUS at 13:06

## 2023-05-10 RX ADMIN — PHENYLEPHRINE HYDROCHLORIDE 100 MCG: 0.1 INJECTION, SOLUTION INTRAVENOUS at 10:45

## 2023-05-10 RX ADMIN — PROPOFOL 100 MG: 10 INJECTION, EMULSION INTRAVENOUS at 10:25

## 2023-05-10 RX ADMIN — MOMETASONE FUROATE AND FORMOTEROL FUMARATE DIHYDRATE 2 PUFF: 100; 5 AEROSOL RESPIRATORY (INHALATION) at 21:36

## 2023-05-10 RX ADMIN — ROPIVACAINE HYDROCHLORIDE 20 ML: 2 INJECTION, SOLUTION EPIDURAL; INFILTRATION at 09:45

## 2023-05-10 RX ADMIN — ACETAMINOPHEN 1000 MG: 500 TABLET, FILM COATED ORAL at 22:54

## 2023-05-10 RX ADMIN — PHENYLEPHRINE HYDROCHLORIDE 100 MCG: 0.1 INJECTION, SOLUTION INTRAVENOUS at 11:00

## 2023-05-10 RX ADMIN — HYDROMORPHONE HYDROCHLORIDE 0.5 MG: 1 INJECTION, SOLUTION INTRAMUSCULAR; INTRAVENOUS; SUBCUTANEOUS at 12:29

## 2023-05-10 RX ADMIN — Medication 25 MG: at 10:23

## 2023-05-10 RX ADMIN — PHENYLEPHRINE HYDROCHLORIDE 100 MCG: 0.1 INJECTION, SOLUTION INTRAVENOUS at 10:50

## 2023-05-10 RX ADMIN — MEPIVACAINE HYDROCHLORIDE 60 MG: 20 INJECTION, SOLUTION EPIDURAL; INFILTRATION at 10:21

## 2023-05-10 RX ADMIN — HYDROMORPHONE HYDROCHLORIDE 2 MG: 2 TABLET ORAL at 15:08

## 2023-05-10 RX ADMIN — PHENYLEPHRINE HYDROCHLORIDE 100 MCG: 0.1 INJECTION, SOLUTION INTRAVENOUS at 10:35

## 2023-05-10 RX ADMIN — Medication 2000 MG: at 10:18

## 2023-05-10 RX ADMIN — PHENYLEPHRINE HYDROCHLORIDE 100 MCG: 0.1 INJECTION, SOLUTION INTRAVENOUS at 10:40

## 2023-05-10 RX ADMIN — HYDROMORPHONE HYDROCHLORIDE 2 MG: 2 TABLET ORAL at 19:23

## 2023-05-10 RX ADMIN — PROPOFOL 100 MCG/KG/MIN: 10 INJECTION, EMULSION INTRAVENOUS at 10:25

## 2023-05-10 RX ADMIN — OXYCODONE HYDROCHLORIDE 10 MG: 10 TABLET, FILM COATED, EXTENDED RELEASE ORAL at 20:59

## 2023-05-10 RX ADMIN — OXYCODONE 5 MG: 5 TABLET ORAL at 12:38

## 2023-05-10 RX ADMIN — GABAPENTIN 900 MG: 300 CAPSULE ORAL at 20:59

## 2023-05-10 RX ADMIN — SODIUM CHLORIDE, POTASSIUM CHLORIDE, SODIUM LACTATE AND CALCIUM CHLORIDE: 600; 310; 30; 20 INJECTION, SOLUTION INTRAVENOUS at 08:58

## 2023-05-10 RX ADMIN — APREPITANT 40 MG: 40 CAPSULE ORAL at 08:48

## 2023-05-10 RX ADMIN — MIDAZOLAM HYDROCHLORIDE 2 MG: 1 INJECTION, SOLUTION INTRAMUSCULAR; INTRAVENOUS at 09:47

## 2023-05-10 RX ADMIN — DEXMEDETOMIDINE 10 MCG: 100 INJECTION, SOLUTION, CONCENTRATE INTRAVENOUS at 10:20

## 2023-05-10 RX ADMIN — DEXMEDETOMIDINE 0.49 MCG/KG/HR: 100 INJECTION, SOLUTION, CONCENTRATE INTRAVENOUS at 10:25

## 2023-05-10 RX ADMIN — SODIUM CHLORIDE, SODIUM LACTATE, POTASSIUM CHLORIDE, AND CALCIUM CHLORIDE: 600; 310; 30; 20 INJECTION, SOLUTION INTRAVENOUS at 10:16

## 2023-05-10 RX ADMIN — HYDROMORPHONE HYDROCHLORIDE 0.5 MG: 1 INJECTION, SOLUTION INTRAMUSCULAR; INTRAVENOUS; SUBCUTANEOUS at 12:24

## 2023-05-10 RX ADMIN — CEFAZOLIN SODIUM 2000 MG: 100 INJECTION, POWDER, LYOPHILIZED, FOR SOLUTION INTRAVENOUS at 17:36

## 2023-05-10 RX ADMIN — TRANEXAMIC ACID 1000 MG: 100 INJECTION, SOLUTION INTRAVENOUS at 11:31

## 2023-05-10 ASSESSMENT — PAIN SCALES - GENERAL
PAINLEVEL_OUTOF10: 6
PAINLEVEL_OUTOF10: 10
PAINLEVEL_OUTOF10: 10
PAINLEVEL_OUTOF10: 6
PAINLEVEL_OUTOF10: 6
PAINLEVEL_OUTOF10: 7
PAINLEVEL_OUTOF10: 6
PAINLEVEL_OUTOF10: 7
PAINLEVEL_OUTOF10: 6
PAINLEVEL_OUTOF10: 6
PAINLEVEL_OUTOF10: 8
PAINLEVEL_OUTOF10: 3
PAINLEVEL_OUTOF10: 8

## 2023-05-10 ASSESSMENT — PAIN DESCRIPTION - LOCATION
LOCATION: KNEE

## 2023-05-10 ASSESSMENT — PAIN - FUNCTIONAL ASSESSMENT
PAIN_FUNCTIONAL_ASSESSMENT: PREVENTS OR INTERFERES SOME ACTIVE ACTIVITIES AND ADLS
PAIN_FUNCTIONAL_ASSESSMENT: 0-10

## 2023-05-10 ASSESSMENT — PAIN DESCRIPTION - ORIENTATION
ORIENTATION: RIGHT

## 2023-05-10 ASSESSMENT — KOOS JR
GOING UP OR DOWN STAIRS: 2
KOOS JR TOTAL INTERVAL SCORE: 52.465
STRAIGHTENING KNEE FULLY: 2
RISING FROM SITTING: 2
TWISING OR PIVOTING ON KNEE: 2
BENDING TO THE FLOOR TO PICK UP OBJECT: 2
STANDING UPRIGHT: 2
HOW SEVERE IS YOUR KNEE STIFFNESS AFTER FIRST WAKING IN MORNING: 2

## 2023-05-10 ASSESSMENT — PAIN DESCRIPTION - DESCRIPTORS
DESCRIPTORS: ACHING
DESCRIPTORS: BURNING

## 2023-05-10 NOTE — PERIOP NOTE
Pt reports pain being a 10/10 on a 0-10 pain scale. 1.5 mg dilaudid given, will give another 0.5 mg of dilaudid and will reassess pain in 10 minutes. Ice pack also applied to the back of the right knee, pt reports that the ice is helping to ease the pain off a little bit. Will continue to monitor in PACU and assess pain.

## 2023-05-10 NOTE — PERIOP NOTE
MD Collier at bedside with patient. Pt VSS stable. Pain and Nausea controlled at this time. Verbal sign out per MD when pacu care is completed. Plan of care continues.

## 2023-05-10 NOTE — ANESTHESIA PRE PROCEDURE
mg    Historical Provider, MD   olopatadine (PATADAY) 0.2 % SOLN ophthalmic solution 1 drop daily    Historical Provider, MD   levocetirizine (XYZAL) 5 MG tablet Take 1 tablet by mouth daily    Historical Provider, MD   calcium carbonate (TUMS) 500 MG chewable tablet Take 1 tablet by mouth daily    Historical Provider, MD   Atogepant (QULIPTA) 60 MG TABS Take 60 mg by mouth daily  Patient not taking: Reported on 4/18/2023 1/19/23   Tre Wu MD   Rimegepant Sulfate 75 MG TBDP Take 75 mg by mouth every other day  Patient not taking: Reported on 4/18/2023 1/12/23   Tre Wu MD   gabapentin (NEURONTIN) 300 MG capsule 600mg qam and qpm, and 900mg at bedtime  Patient taking differently: 600mg qam and and afternoon and 900mg at bedtime 1/12/23 7/12/23  Tre Wu MD   Capsaicin 0.1 % CREA Apply to left hand twice daily as directed 11/16/22   ROSAURA Eric CNP   meloxicam (MOBIC) 15 MG tablet Take 1 tablet by mouth daily for 28 days 10/11/22 11/8/22  ROSAURA Eric CNP   meloxicam (MOBIC) 15 MG tablet Take 1 tablet by mouth daily for 21 days 9/27/22 4/18/23  ROSAURA Eric CNP   ondansetron (ZOFRAN ODT) 4 MG disintegrating tablet Take 1 tablet by mouth every 8 hours as needed for Nausea or Vomiting 9/15/22   ROSAURA Parish CNP   amitriptyline (ELAVIL) 10 MG tablet Take 2 tablets by mouth nightly 9/15/22   Tre Wu MD   diclofenac sodium (VOLTAREN) 1 % GEL Apply 2 g topically 2 times daily as needed for Pain 8/15/22   Xiao Brown MD   amitriptyline (ELAVIL) 25 MG tablet Take 1 tablet by mouth nightly for 20 days  Patient taking differently: Take 20 mg by mouth nightly 7/5/22 7/25/22  Yue Ramirez MD   ondansetron (ZOFRAN ODT) 4 MG disintegrating tablet Take 1 tablet by mouth every 8 hours as needed for Nausea or Vomiting 6/27/22   ROSAURA Parish - CNP   diclofenac (VOLTAREN) 75 MG EC tablet Take 1 tablet by mouth nightly    Historical Provider, MD   acetaminophen

## 2023-05-10 NOTE — PERIOP NOTE
Pt reports that pain has decreased and is now a 6/10 on a scale of 0-10. Says that she is now comfortable and the pain is at a tolerable level. Will continue to monitor in PACU.

## 2023-05-10 NOTE — ANESTHESIA PROCEDURE NOTES
Spinal Block    Patient location during procedure: OR  End time: 5/10/2023 10:23 AM  Reason for block: primary anesthetic  Staffing  Performed: anesthesiologist   Anesthesiologist: Krystal Rust MD  Spinal Block  Patient position: sitting  Prep: ChloraPrep  Patient monitoring: cardiac monitor, continuous pulse ox, frequent blood pressure checks and oxygen  Approach: midline  Location: L3/L4  Provider prep: sterile gloves and mask  Local infiltration: lidocaine  Needle  Needle type:  Viktoria   Needle gauge: 25 G  Assessment  CSF: clear  Attempts: 1  Hemodynamics: stable  Preanesthetic Checklist  Completed: patient identified, IV checked, site marked, risks and benefits discussed, surgical/procedural consents, equipment checked, pre-op evaluation, timeout performed, anesthesia consent given, oxygen available and monitors applied/VS acknowledged

## 2023-05-10 NOTE — H&P
Patient ID:  Lakeisha Carty  [id number]  46 y.o.  1980    Today: May 10, 2023          CC:  Right  Knee pain    HPI:   The patient has end stage arthritis of the right knee. The patient was evaluated and examined during a consultation prior to today. The patient complains of knee pain with activities, reports pain as mostly occurring along the joint lines, reports stiffness of the knee with prolonged inactivity, and swelling/pain at the end of the day and after increased physical activity. The pain affects the patients activities of daily living and quality of life. The patient has attempted and exhausted conservative treatment. There have been no changes to the patient's orthopedic condition since the last office visit. Past Medical History:  Past Medical History:   Diagnosis Date    Asthma     daily inhaler, PRN inhaler--unsure of when last used    Fatty liver     Fibromyalgia     Former cigarette smoker     GERD (gastroesophageal reflux disease)     History of bladder cancer 2005    6 weeks of BCG--per PCP note \" She has not seen a urologist in several years, and would benefit from repeat cystoscopy or follow-up. \"    History of blood transfusion 2013    History of breast cancer     Tamoxifen for 3 years/ Right breast- lumpectomy    History of COVID-19 12/2020    pt hospitalized on BIPAP    History of gastric ulcer     PCP has referred pt to GI    Liver tumor     PCP has referred pt to GI--per PCP note \"Chronic. resolved. Pt reports s/p arterial embolization with \"cure\". No further imaging to elucidate this. Will refer to GI for further management and consider ordering RUQ US prior to appt with them \"    Mitral valve prolapse     echo (2/17/23) states \"The mitral valve is normal. No mitral valve stenosis. Trace mitral valve regurgitation. \"    PONV (postoperative nausea and vomiting)     post-op.  Pt does well with antiemetic    Prolonged emergence from general anesthesia     Retention of urine     X1

## 2023-05-10 NOTE — OP NOTE
[address]  Total Knee Arthroplasty  Patient:Jose Tsang   : 1980  Medical Record Number:[id number]    Pre-operative Diagnosis: Primary osteoarthritis of right knee [M17.11]  Acquired deformity of right knee [M21.961]    Post-operative Diagnosis: Same    Location: [facility]    Date of Procedure: 5/10/2023    Surgeon: King Lorin MD    Assistant: Saida Miller CFA    Anesthesia: Spinal + adductor nerve block    Procedure:  NITIN-Assisted Right Total Knee Arthroplasty    Tourniquet Time: Tourniquet Not Used    BMI: Body mass index is 30.76 kg/m². EBL: 037LR    Complications: None    Patient Condition upon Completion of Procedure: Stable    Implants:   Implant Name Type Inv. Item Serial No.  Lot No. LRB No. Used Action   COMPONENT PAT [id number] THK9MM SUP INFERIOR KNEE TRITANIUM - [id number]  COMPONENT PAT [id number] SUP INFERIOR KNEE TRITANIUM  [employer]  Right 1 Implanted   INSERT TIB CNDYL STBL 1 11 MM KNEE 5/PK X3 TRIATHLON - [id number]  INSERT TIB CNDYL STBL 1 11 MM KNEE 5/PK X3 TRIATHLON  [employer] [id number] Right 1 Implanted   COMPONENT FEM SZ 2 R KNEE CRUCE RET CEMENTLESS BEAD W/ ELISABET - [id number]  COMPONENT FEM SZ 2 R KNEE CRUCE RET CEMENTLESS BEAD W/ ELISABET  [employer] [id number] Right 1 Implanted   BASEPLATE TIB SZ 1 KEEL [id number] D28MM PEG L7MM DIA7MM AP40MM - [id number]  BASEPLATE TIB SZ 1 KEEL [id number] D28MM PEG L7MM DIA7MM AP40MM  [employer] [id number] Right 1 Implanted       Pre-Operative Plan/Implants:   - #2 Femoral Component   - #1 Tibial Component   - 9 mm Polyethylene Component    Intra-Operative Findings: Prior to bony resection we found that the patient's knee lacked approximately 0 degrees of extension. Also prior to bony resection we noted a varus coronal deformity.  Intra-operatively we noted that the articular surfaces were arthritic with cartilage loss of both the

## 2023-05-10 NOTE — PERIOP NOTE
TRANSFER - OUT REPORT:    Verbal report given to AARON Russo on Trent Gonzales  being transferred to [facility]  for routine post-op       Report consisted of patient's Situation, Background, Assessment and   Recommendations(SBAR). Information from the following report(s) Nurse Handoff Report was reviewed with the receiving nurse. Holden Assessment: No data recorded  Lines:   Peripheral IV 05/10/23 Left;Posterior Hand (Active)   Site Assessment Clean, dry & intact 05/10/23 1259   Line Status Infusing 05/10/23 [facility] Connections checked and tightened 05/10/23 1259   Phlebitis Assessment No symptoms 05/10/23 1259   Infiltration Assessment 0 05/10/23 1259   Alcohol Cap Used No 05/10/23 1259   Dressing Status Clean, dry & intact 05/10/23 1259   Dressing Type Transparent 05/10/23 1259        Opportunity for questions and clarification was provided.       Patient transported with:  O2 @ room air lpm  IV  Chart

## 2023-05-10 NOTE — ANESTHESIA PROCEDURE NOTES
Peripheral Block    Patient location during procedure: pre-op  Reason for block: post-op pain management and at surgeon's request  Start time: 5/10/2023 9:45 AM  End time: 5/10/2023 9:48 AM  Staffing  Performed: anesthesiologist   Anesthesiologist: Trena Cavanaugh MD  Preanesthetic Checklist  Completed: patient identified, IV checked, site marked, risks and benefits discussed, surgical/procedural consents, equipment checked, pre-op evaluation, timeout performed, anesthesia consent given, oxygen available and monitors applied/VS acknowledged  Peripheral Block   Patient position: supine  Prep: ChloraPrep  Provider prep: sterile gloves and mask  Patient monitoring: cardiac monitor, continuous pulse ox, frequent blood pressure checks, IV access, oxygen and responsive to questions  Block type: Femoral  Adductor canal  Laterality: right  Injection technique: single-shot  Guidance: ultrasound guided    Needle   Needle type: insulated echogenic nerve stimulator needle   Needle gauge: 20 G  Needle localization: ultrasound guidance  Needle length: 10 cm  Assessment   Injection assessment: negative aspiration for heme, no paresthesia on injection, local visualized surrounding nerve on ultrasound and no intravascular symptoms  Paresthesia pain: none  Slow fractionated injection: yes  Hemodynamics: stable  Real-time US image taken/store: yes  Outcomes: uncomplicated and patient tolerated procedure well    Additional Notes  Ultrasound image taken and stored in chart   Medications Administered  dexamethasone (DECADRON) injection 4 mg/mL - Perineural   4 mg - 5/10/2023 9:45:00 AM  ropivacaine (NAROPIN) injection 0.2% - Perineural   20 mL - 5/10/2023 9:45:00 AM

## 2023-05-10 NOTE — ANESTHESIA POSTPROCEDURE EVALUATION
Department of Anesthesiology  Postprocedure Note    Patient: Nelida Law  MRN: [id number]  YOB: 1980  Date of evaluation: 5/10/2023      Procedure Summary     Date: 05/10/23 [place] [facility] [place] [facility] [place] [facility]    Anesthesia Start: 1263 Anesthesia Stop: 1215    Procedure: RIGHT KNEE TOTAL ARTHROPLASTY ROBOTIC, NITIN Nugent (Right: Knee) Diagnosis:       Primary osteoarthritis of right knee      Acquired deformity of right knee      (Primary osteoarthritis of right knee [M17.11])      (Acquired deformity of right knee [M21.961])    Surgeons: Socrates Acosta MD Responsible Provider: Caro Andrews MD    Anesthesia Type: Spinal ASA Status: 3          Anesthesia Type: Spinal    Collette Phase I: Collette Score: 10    Collette Phase II:        Anesthesia Post Evaluation    Patient location during evaluation: PACU  Patient participation: complete - patient participated  Level of consciousness: awake and alert  Airway patency: patent  Nausea: well controlled. Complications: no  Cardiovascular status: acceptable.   Respiratory status: acceptable  Hydration status: stable

## 2023-05-11 ENCOUNTER — CLINICAL DOCUMENTATION (OUTPATIENT)
Dept: ORTHOPEDIC SURGERY | Age: 43
End: 2023-05-11

## 2023-05-11 VITALS
BODY MASS INDEX: 30.73 KG/M2 | WEIGHT: 162.8 LBS | TEMPERATURE: 98.2 F | HEIGHT: 61 IN | SYSTOLIC BLOOD PRESSURE: 101 MMHG | OXYGEN SATURATION: 100 % | DIASTOLIC BLOOD PRESSURE: 78 MMHG | RESPIRATION RATE: 16 BRPM | HEART RATE: 83 BPM

## 2023-05-11 LAB
HCT VFR BLD AUTO: 30.7 % (ref 35.8–46.3)
HGB BLD-MCNC: 10.4 G/DL (ref 11.7–15.4)

## 2023-05-11 PROCEDURE — 97530 THERAPEUTIC ACTIVITIES: CPT

## 2023-05-11 PROCEDURE — 97535 SELF CARE MNGMENT TRAINING: CPT

## 2023-05-11 PROCEDURE — 6370000000 HC RX 637 (ALT 250 FOR IP): Performed by: ORTHOPAEDIC SURGERY

## 2023-05-11 PROCEDURE — 85014 HEMATOCRIT: CPT

## 2023-05-11 PROCEDURE — 85018 HEMOGLOBIN: CPT

## 2023-05-11 PROCEDURE — 2580000003 HC RX 258: Performed by: NURSE PRACTITIONER

## 2023-05-11 PROCEDURE — 99024 POSTOP FOLLOW-UP VISIT: CPT | Performed by: NURSE PRACTITIONER

## 2023-05-11 PROCEDURE — 6370000000 HC RX 637 (ALT 250 FOR IP): Performed by: NURSE PRACTITIONER

## 2023-05-11 PROCEDURE — 6360000002 HC RX W HCPCS: Performed by: NURSE PRACTITIONER

## 2023-05-11 PROCEDURE — 36415 COLL VENOUS BLD VENIPUNCTURE: CPT

## 2023-05-11 RX ADMIN — HYDROMORPHONE HYDROCHLORIDE 2 MG: 2 TABLET ORAL at 03:52

## 2023-05-11 RX ADMIN — HYDROMORPHONE HYDROCHLORIDE 2 MG: 2 TABLET ORAL at 08:23

## 2023-05-11 RX ADMIN — ASPIRIN 81 MG: 81 TABLET, COATED ORAL at 08:23

## 2023-05-11 RX ADMIN — PANTOPRAZOLE SODIUM 40 MG: 40 TABLET, DELAYED RELEASE ORAL at 05:42

## 2023-05-11 RX ADMIN — ACETAMINOPHEN 1000 MG: 500 TABLET, FILM COATED ORAL at 05:42

## 2023-05-11 RX ADMIN — METOPROLOL SUCCINATE 50 MG: 50 TABLET, EXTENDED RELEASE ORAL at 08:23

## 2023-05-11 RX ADMIN — KETOROLAC TROMETHAMINE 15 MG: 15 INJECTION, SOLUTION INTRAMUSCULAR; INTRAVENOUS at 02:09

## 2023-05-11 RX ADMIN — SENNOSIDES AND DOCUSATE SODIUM 1 TABLET: 50; 8.6 TABLET ORAL at 08:23

## 2023-05-11 RX ADMIN — HYDROMORPHONE HYDROCHLORIDE 1 MG: 1 INJECTION, SOLUTION INTRAMUSCULAR; INTRAVENOUS; SUBCUTANEOUS at 12:08

## 2023-05-11 RX ADMIN — ACETAMINOPHEN 1000 MG: 500 TABLET, FILM COATED ORAL at 12:08

## 2023-05-11 RX ADMIN — OXYCODONE HYDROCHLORIDE 10 MG: 10 TABLET, FILM COATED, EXTENDED RELEASE ORAL at 08:23

## 2023-05-11 RX ADMIN — ONDANSETRON 8 MG: 4 TABLET, ORALLY DISINTEGRATING ORAL at 08:23

## 2023-05-11 RX ADMIN — CEFAZOLIN SODIUM 2000 MG: 100 INJECTION, POWDER, LYOPHILIZED, FOR SOLUTION INTRAVENOUS at 02:10

## 2023-05-11 RX ADMIN — DEXAMETHASONE SODIUM PHOSPHATE 10 MG: 10 INJECTION INTRAMUSCULAR; INTRAVENOUS at 12:09

## 2023-05-11 RX ADMIN — GABAPENTIN 600 MG: 300 CAPSULE ORAL at 08:23

## 2023-05-11 NOTE — DISCHARGE INSTRUCTIONS
does not get better after you take pain medicine. Watch closely for changes in your health, and be sure to contact your doctor if:    You do not have a bowel movement after taking a laxative. Where can you learn more? Go to http://www.woods.com/ and enter T054 to learn more about \"Total Knee Replacement: What to Expect at Home. \"  Current as of: November 9, 2022               Content Version: 13.6  © 2006-2023 [employer]. Care instructions adapted under license by [facility] ([facility]). If you have questions about a medical condition or this instruction, always ask your healthcare professional. [address] any warranty or liability for your use of this information. Information obtained by :  I understand that if any problems occur once I am at home I am to contact my physician. I understand and acknowledge receipt of the instructions indicated above.                                                                                                                                            Physician's or R.N.'s Signature                                                                  Date/Time                                                                                                                                              Patient or Representative Signature                                                          Date/Time

## 2023-05-11 NOTE — PLAN OF CARE
Problem: Safety - Adult  Goal: Free from fall injury  5/11/2023 0955 by Jillian Mistry RN  Outcome: Progressing  5/10/2023 2009 by Fabricio Rubin RN  Outcome: Progressing     Problem: Pain  Goal: Verbalizes/displays adequate comfort level or baseline comfort level  5/11/2023 0955 by Jillian Mistry RN  Outcome: Progressing  5/10/2023 2009 by Fabricio Rubin RN  Outcome: Progressing     Problem: Discharge Planning  Goal: Discharge to home or other facility with appropriate resources  5/11/2023 0955 by Jillian Mistry RN  Outcome: Progressing  5/10/2023 2009 by Fabricio Rubin RN  Outcome: Progressing     Problem: Skin/Tissue Integrity  Goal: Absence of new skin breakdown  Description: 1. Monitor for areas of redness and/or skin breakdown  2. Assess vascular access sites hourly  3. Every 4-6 hours minimum:  Change oxygen saturation probe site  4. Every 4-6 hours:  If on nasal continuous positive airway pressure, respiratory therapy assess nares and determine need for appliance change or resting period.   5/11/2023 0955 by Jillian Mistry RN  Outcome: Progressing  5/10/2023 2009 by Fabricio Rubin RN  Outcome: Progressing     Problem: ABCDS Injury Assessment  Goal: Absence of physical injury  5/11/2023 0955 by Jillian Mistry RN  Outcome: Progressing  5/10/2023 2009 by Fabricio Rubin RN  Outcome: Progressing

## 2023-05-11 NOTE — PROGRESS NOTES
05/10/23 1522   Oxygen Therapy/Pulse Ox   O2 Therapy Room air   O2 Device None (Room air)   O2 Flow Rate (L/min) 0 L/min   Pulse 62   Respirations 16   SpO2 98 %   $Pulse Oximeter $Spot check (single)     Patient achieved  2000  Ml/sec on IS. Patient encouraged to do every hour while awake-patient agreed and demonstrated. No shortness of breath or distress noted.  BS are clear b/l.   - continuous sat ordered HS
05/10/23 2138   Treatment   Treatment Type MDI   $Treatment Type $Inhaled Therapy/Meds   Medications Mometasone/Formoterol   Pre-Tx Pulse 75   Pre-Tx Resps 16   Breath Sounds Pre-Tx ANJEL Clear   Breath Sounds Pre-Tx LLL Clear   Breath Sounds Pre-Tx RUL Clear   Breath Sounds Pre-Tx RML Clear   Breath Sounds Pre-Tx RLL Clear   Breath Sounds Post-Tx ANJEL Clear   Breath Sounds Post-Tx LLL Clear   Breath Sounds Post-Tx RUL Clear   Breath Sounds Post-Tx RML Clear   Breath Sounds Post-Tx RLL Clear   Post-Tx Pulse 75   Post-Tx Resps 16   Delivery Source MDI with spacer   Position Sitting   Treatment Tolerance Well   Duration 5   Is patient on O2? N   Oxygen Therapy/Pulse Ox   O2 Therapy Room air   O2 Device None (Room air)   Pulse 75   SpO2 100 %   Pulse Oximeter Device Mode Continuous   Pulse Oximeter Device Location Right;Finger   $Pulse Oximeter $Spot check (multiple/continuous)     Pt on continuous monitor for HS. Alarm limits set. Pt working on IS.
ACUTE PHYSICAL THERAPY GOALS:   (Developed with and agreed upon by patient and/or caregiver.)  GOALS (1-4 days):  (1.)Ms. Salas Griffin will move from supine to sit and sit to supine  in bed with INDEPENDENT. (2.)Ms. Salas Griffin will transfer from bed to chair and chair to bed with INDEPENDENT using the least restrictive device. (3.)Ms. Salas Griffin will ambulate with INDEPENDENT for 250 feet with the least restrictive device. (4.)Ms. Salas Griffin will ambulate up/down 3 steps with bilateral  railing with MINIMAL ASSIST.  (5.)Ms. Salas Griffin will increase right knee ROM to 0-90°.  ________________________________________________________________________________________________          PHYSICAL THERAPY JOINT CAMP: TOTAL KNEE ARTHROPLASTY Initial Assessment, Daily Note, and PM  (Link to Caseload Tracking: PT Visit Days : 1  Acknowledge Orders  Time In/Out  PT Charge Capture  Rehab Caseload Tracker  Episode   Nancy Sandifer is a 36 y.o. female   PRIMARY DIAGNOSIS: Osteoarthritis of right knee, unspecified osteoarthritis type  Primary osteoarthritis of right knee [M17.11]  Acquired deformity of right knee [M21.961]  Osteoarthritis of right knee, unspecified osteoarthritis type [M17.11]  Procedure(s) (LRB):  RIGHT KNEE TOTAL ARTHROPLASTY ROBOTIC, NITIN Nugent (Right)  Day of Surgery  Reason for Referral: Pain in Right Knee (M25.561)  Stiffness of Right Knee, Not elsewhere classified (M25.661)  Difficulty in walking, Not elsewhere classified (R26.2)  Other abnormalities of gait and mobility (R26.89)  Outpatient in a bed: Payor: Indu Castelan / Plan: Adelina Ganser / Product Type: *No Product type* /     REHAB RECOMMENDATIONS:   Recommendation to date pending progress:  Setting:  Home Health Therapy    Equipment:    Rolling Walker     RANGE OF MOTION:   Right Knee Flexion:  90  Right Knee Extension:  5     GAIT: I Mod I S SBA CGA Min Mod Max Total  NT x2 Comments:   Level of Assistance [] [] [] [] [x] [] [] [] [] [] []            Weightbearing Status  Right
May 11, 2023         Post Op day: 1 Day Post-Op     Admit Date: 5/10/2023    Admit Diagnosis: Primary osteoarthritis of right knee [M17.11]  Acquired deformity of right knee [M21.961]  Osteoarthritis of right knee, unspecified osteoarthritis type [M17.11]        Subjective: Patient stable. No acute events.       Objective:     Vitals:    23 0422   BP:    Pulse:    Resp: 16   Temp:    SpO2:     Temp (24hrs), Av °F (36.7 °C), Min:97.3 °F (36.3 °C), Max:98.3 °F (36.8 °C)      Lab Results   Component Value Date/Time    HGB 10.4 2023 04:43 AM       Patient Active Problem List   Diagnosis    PSVT (paroxysmal supraventricular tachycardia) (HCC)    Mitral valve regurgitation    H/O cardiac radiofrequency ablation    Shortness of breath    Malignant neoplasm of breast (HCC)    Malignant neoplasm of urinary bladder (HCC)    Focal nodular hyperplasia of liver    Cervical migraine syndrome    Neck pain    Chronic tension-type headache, intractable    Right carpal tunnel syndrome    Cubital tunnel syndrome on right    Arthritis of carpometacarpal (CMC) joint of right thumb    Left carpal tunnel syndrome    Trigger finger of left thumb    Trigger ring finger of left hand    Trigger finger of left hand    Trigger index finger of left hand    Intractable chronic migraine without aura and without status migrainosus    Cervico-occipital neuralgia of left side    Primary osteoarthritis of right knee    Acquired deformity of right knee    Osteoarthritis of right knee, unspecified osteoarthritis type       Current Facility-Administered Medications   Medication Dose Route Frequency    lactated ringers IV soln infusion   IntraVENous Continuous    albuterol (PROVENTIL) (2.5 MG/3ML) 0.083% nebulizer solution 2.5 mg  2.5 mg Nebulization Q6H PRN    amitriptyline (ELAVIL) tablet 20 mg  20 mg Oral Nightly    mometasone-formoterol (DULERA) 100-5 MCG/ACT inhaler 2 puff  2 puff Inhalation BID    cetirizine (ZYRTEC) tablet 10 mg
TRANSFER - IN REPORT:    Verbal report received from Darrick RN(name) on Dominique Shock  being received from pacu(unit) for routine post-op      Report consisted of patients Situation, Background, Assessment and   Recommendations(SBAR). Information from the following report(s) Nurse Handoff Report was reviewed with the receiving nurse. Opportunity for questions and clarification was provided. Assessment completed upon patients arrival to unit and care assumed.
SBA  in standing   Tone []       Edema []    Activity Tolerance []  Fair + with mobility     Hand Dominance R [] L []      COGNITION/  PERCEPTION: INTACT IMPAIRED   (See Comments)   Orientation [x]     Vision [x]     Hearing [x]     Cognition  [x]     Perception [x]       MOBILITY: I Mod I S SBA CGA Min Mod Max Total  NT x2 Comments:   Bed Mobility    Rolling [] [] [] [] [] [] [] [] [] [] []    Supine to Sit [] [] [] [] [x] [] [] [] [] [] []    Scooting [] [] [] [] [x] [] [] [] [] [] []    Sit to Supine [] [] [] [] [] [] [] [] [] [] []    Transfers    Sit to Stand [] [] [] [x] [] [] [] [] [] [] []    Bed to Chair [] [] [] [x] [] [] [] [] [] [] []    Stand to Sit [] [] [] [x] [] [] [] [] [] [] []    Tub/Shower [] [] [] [x] [] [] [] [] [] [] []       Toilet [] [] [] [x] [] [] [] [] [] [] []        [] [] [] [] [] [] [] [] [] [] []    I=Independent, Mod I=Modified Independent, S=Supervision/Setup, SBA=Standby Assistance, CGA=Contact Guard Assistance, Min=Minimal Assistance, Mod=Moderate Assistance, Max=Maximal Assistance, Total=Total Assistance, NT=Not Tested    ACTIVITIES OF DAILY LIVING: I Mod I S SBA CGA Min Mod Max Total NT Comments   BASIC ADLs:              Upper Body Bathing [] [] [x] [] [] [] [] [] [] []    Lower Body Bathing [] [] [] [] [] [x] [] [] [] []    Toileting [] [] [] [x] [] [] [] [] [] []    Upper Body Dressing [] [] [x] [] [] [] [] [] [] []    Lower Body Dressing [] [] [] [] [] [x] [] [] [] []    Feeding [] [] [x] [] [] [] [] [] [] []    Grooming [] [] [x] [] [] [] [] [] [] []    Personal Device Care [] [] [] [] [] [] [] [] [] []    Functional Mobility [] [] [] [x] [] [] [] [] [] [] RW   I=Independent, Mod I=Modified Independent, S=Supervision/Setup, SBA=Standby Assistance, CGA=Contact Guard Assistance, Min=Minimal Assistance, Mod=Moderate Assistance, Max=Maximal Assistance, Total=Total Assistance, NT=Not Tested    PLAN:     FREQUENCY/DURATION   OT Plan of Care: 1 time/week, 2 times/week for
-GOAL MET 5/10/23     2. Ms. Mago Guzman will perform bathing activity with minimal assist required to demonstrate improved functional mobility and safety. 3.  Ms. Mago Guzman will perform toileting activity with  contact guard assist to demonstrate improved functional mobility and safety. 4.  Ms. Mago Guzman will perform all functional transfers transfer with contact guard assist to demonstrate improved functional mobility and safety. PROBLEM LIST:   (Skilled intervention is medically necessary to address:)  Decreased ADL/Functional Activities  Decreased Activity Tolerance  Decreased Coordination  Decreased Gait Ability  Decreased Safety Awareness  Decreased Strength  Decreased Transfer Abilities  Increased Pain   INTERVENTIONS PLANNED:   (Benefits and precautions of occupational therapy have been discussed with the patient.)  Self Care Training  Therapeutic Activity  Therapeutic Exercise/HEP  Neuromuscular Re-education  Education         TREATMENT:     EVALUATION: LOW COMPLEXITY: (Untimed Charge)    TREATMENT:   SELF CARE: (10 minutes):   Procedure(s) (per grid) utilized to improve and/or restore self-care/home management as related to dressing and functional mobiltiy . Required minimal visual, verbal, manual, and tactile cueing to facilitate activities of daily living skills, compensatory activities, and OT poc and discharge planning .       AFTER TREATMENT PRECAUTIONS: Bed/Chair Locked, Call light within reach, Chair, Needs within reach, and RN notified    INTERDISCIPLINARY COLLABORATION:  RN/ PCT, PT/ PTA, and OT/ TRIVEDI    EDUCATION:  Education Given To: Patient  Education Provided: Role of Therapy, Plan of Care, Precautions, ADL Adaptive Strategies, Transfer Training, Fall Prevention Strategies, Equipment, Family Education  Education Method: Demonstration, Verbal  Barriers to Learning: None  Education Outcome: Verbalized understanding, Demonstrated understanding  [x] Safe And Effective Hygiene  [x] Fall Precautions  []
Transfer Training, Ambulation on level ground, Sitting balance , and Standing balance to improve functional Activity tolerance, Balance, Coordination, Mobility, Strength, and ROM. TREATMENT GRID:  THERAPEUTIC  EXERCISES: DATE:  5/10 DATE:  5/11   DATE:      AM PM AM PM AM PM    [] [] [] [] [] []   Ankle Pumps  10 15      Quad Sets  10 15      Gluteal Sets  10 15      Hip Abd/ADduction  10 15 aa      Straight Leg Raises  10 15 aa      Knee Slides  10 15 aa      Short Arc Quads  10 15 aa      Chair Slides  10 15                        B = bilateral; AA = active assistive; A = active; P = passive      EDUCATION: Education Given To: Patient  Education Provided: Role of Therapy, Home Exercise Program  Education Method: Demonstration, Verbal  Education Outcome: Verbalized understanding, Demonstrated understanding  EDUCATION:  [x] Home Exercises  [x] Fall Precautions  [x] No Pillow Under Knee  [] D/C Instruction Review [x] Cryocuff  [x] Walker Management/Safety  [x] Adaptive Equipment as Needed     AFTER TREATMENT PRECAUTIONS: Bed/Chair Locked, Call light within reach, Chair, Needs within reach, and RN notified    INTERDISCIPLINARY COLLABORATION:  RN/ PCT and PT/ PTA    COMPLIANCE WITH PROGRAM/EXERCISE: compliant most of the time, Will assess as treatment progresses. RECOMMENDATIONS/INTENT FOR NEXT TREATMENT SESSION: Treatment next visit will focus on increasing Ms. Sainz's independence with bed mobility, transfers, gait training, strength/ROM exercises, modalities for pain, and patient education.      TIME IN/OUT:  Time In: 0925  Time Out: [facility]  Minutes: [address], [facility]

## 2023-05-11 NOTE — CARE COORDINATION
Patient is a 36y.o. year old female admitted for Right TKA . Patient plans to return home on discharge. Order received to arrange home health. Patient without preference towards agency. Referral sent to [facility]. Patient requesting we arrange a walker and bedside commode. Pt without preference towards provider. Referral sent to [address] [place], [place] [address] delivered to the hospital room prior to discharge. Will follow until discharge. 05/11/23 1995 [facility] [address] Discharge   Transition of Care Consult (CM Consult) [address] [facility] Home Health No   Reason Outside Agency Chosen Out of service area   [facility] 82 Discharge Home Health;PT   Condition of Participation: Discharge Planning   The Plan for Transition of Care is related to the following treatment goals: improve mobility   The Patient and/or Patient Representative was provided with a Choice of Provider? Patient   The Patient and/Or Patient Representative agree with the Discharge Plan? Yes   Freedom of Choice list was provided with basic dialogue that supports the patient's individualized plan of care/goals, treatment preferences, and shares the quality data associated with the providers?   Yes

## 2023-05-12 ENCOUNTER — PATIENT MESSAGE (OUTPATIENT)
Dept: ORTHOPEDIC SURGERY | Age: 43
End: 2023-05-12

## 2023-05-12 ENCOUNTER — TELEPHONE (OUTPATIENT)
Dept: ORTHOPEDIC SURGERY | Age: 43
End: 2023-05-12

## 2023-05-13 ENCOUNTER — CLINICAL DOCUMENTATION (OUTPATIENT)
Dept: ORTHOPEDIC SURGERY | Age: 43
End: 2023-05-13

## 2023-05-13 NOTE — PROGRESS NOTES
Spoke with [facility] nurse regarding patients level of pain post right TKA on 05/10/2023. She was advised to continue taking Dilaudid 2mg tabs q 4h for pain, can include tylenol, elevate the extremity, use Ice man and limit activity level. She was advised if pain did not become more controlled or tolerable that the ER was an option. She did not feel this was necessary at this time. [facility] nurse will be following up with [facility] team Monday 05/15/2023.

## 2023-05-15 ENCOUNTER — TELEPHONE (OUTPATIENT)
Dept: ORTHOPEDIC SURGERY | Age: 43
End: 2023-05-15

## 2023-05-15 NOTE — TELEPHONE ENCOUNTER
I called and spoke to [facility]. I have approved the verbal order.

## 2023-05-15 NOTE — TELEPHONE ENCOUNTER
Kelvin Courser PT w/ homecare [phone] cont.  For 3xwk for 2wks then 2xwk for 1wk also patient is in a lot of pain and discomfort and has no bandage to change with

## 2023-05-16 ENCOUNTER — TELEPHONE (OUTPATIENT)
Dept: ORTHOPEDIC SURGERY | Age: 43
End: 2023-05-16

## 2023-05-16 NOTE — TELEPHONE ENCOUNTER
Nathalia Guillen is calling about this patient. She states she has high anxiety and asiya wants to make you aware. She is a week postop and has done no exercise whatsoever. She is very stiff at 50 degrees of flexion and lacking about 15 degrees of extension. She leaves her ice machine on for 24/7 and was upset when Nathalia Guillen told her she needs to have some time with it off.  She has been very tough to work with

## 2023-05-17 ENCOUNTER — TELEPHONE (OUTPATIENT)
Dept: ORTHOPEDIC SURGERY | Age: 43
End: 2023-05-17

## 2023-05-17 DIAGNOSIS — Z96.651 STATUS POST RIGHT KNEE REPLACEMENT: Primary | ICD-10-CM

## 2023-05-17 RX ORDER — HYDROMORPHONE HYDROCHLORIDE 2 MG/1
2 TABLET ORAL EVERY 4 HOURS PRN
Qty: 40 TABLET | Refills: 0 | Status: SHIPPED | OUTPATIENT
Start: 2023-05-17 | End: 2023-05-24

## 2023-05-17 NOTE — TELEPHONE ENCOUNTER
I spoke to [place] with [facility]. She informed me that the patient is not following directions. The patient is leaving her ice man machine on for 24 hours, will not let  change bandage because the bandage will not be water proof, ROM is 58 degrees flexion, and wants to have [place] extended for as long as possible. I told [place] that I will contact the patient and talk to her. I left patient a voice mail to return my call.

## 2023-05-17 NOTE — TELEPHONE ENCOUNTER
Isis with [address] [facility] is having a problem w/pt is not doing what she is supposed  To be doing. [phone]

## 2023-05-18 ENCOUNTER — TELEPHONE (OUTPATIENT)
Dept: ORTHOPEDIC SURGERY | Age: 43
End: 2023-05-18

## 2023-05-18 ENCOUNTER — TELEPHONE (OUTPATIENT)
Dept: ORTHOPEDICS UNIT | Age: 43
End: 2023-05-18

## 2023-05-18 NOTE — TELEPHONE ENCOUNTER
I called and spoke to patient after receiving a phone call from [facility] yesterday. I was told by [facility] that they had some concerns regarding the patient. I called the patient and went over some protocols. I let the patient know that she needs to be elevating her leg above her heart and I want her to do that 2-3x a day. I want her to get up every hour and make sure that she is moving. I explained to the patient how important physical therapy is at this time. Patient told me that she will follow the protocols that I have given her.

## 2023-05-19 ENCOUNTER — HOSPITAL ENCOUNTER (OUTPATIENT)
Dept: ULTRASOUND IMAGING | Age: 43
Discharge: HOME | End: 2023-05-19
Payer: COMMERCIAL

## 2023-05-19 DIAGNOSIS — Z96.651 STATUS POST RIGHT KNEE REPLACEMENT: ICD-10-CM

## 2023-05-19 DIAGNOSIS — Z96.651 STATUS POST RIGHT KNEE REPLACEMENT: Primary | ICD-10-CM

## 2023-05-19 PROCEDURE — 93971 EXTREMITY STUDY: CPT

## 2023-05-19 NOTE — TELEPHONE ENCOUNTER
I called and spoke to patient. I have scheduled her for a STAT ultrasound for today at 2:30 at the [facility] location.

## 2023-05-22 ENCOUNTER — TELEPHONE (OUTPATIENT)
Dept: ORTHOPEDICS UNIT | Age: 43
End: 2023-05-22

## 2023-05-22 ENCOUNTER — CLINICAL DOCUMENTATION (OUTPATIENT)
Dept: ORTHOPEDIC SURGERY | Age: 43
End: 2023-05-22

## 2023-05-22 NOTE — TELEPHONE ENCOUNTER
Patient contacted navigator with concern that right knee dressing needs changed. Picture of right knee sent to navigator via secure line. Dime size area of bloody drainage noted to distal end of dressing. Advised patient that right knee dressing is WNL and does not require a dressing change at this time.

## 2023-05-24 DIAGNOSIS — Z96.651 STATUS POST RIGHT KNEE REPLACEMENT: ICD-10-CM

## 2023-05-25 DIAGNOSIS — Z96.651 STATUS POST RIGHT KNEE REPLACEMENT: Primary | ICD-10-CM

## 2023-05-25 RX ORDER — HYDROMORPHONE HYDROCHLORIDE 2 MG/1
2 TABLET ORAL
Qty: 42 TABLET | Refills: 0 | Status: SHIPPED | OUTPATIENT
Start: 2023-05-25 | End: 2023-06-01

## 2023-05-30 RX ORDER — SALICYLIC ACID 40 %
ADHESIVE PATCH, MEDICATED TOPICAL
Qty: 60 TABLET | Refills: 0 | OUTPATIENT
Start: 2023-05-30

## 2023-05-31 ENCOUNTER — TELEPHONE (OUTPATIENT)
Dept: ORTHOPEDIC SURGERY | Age: 43
End: 2023-05-31

## 2023-06-02 ENCOUNTER — OFFICE VISIT (OUTPATIENT)
Dept: ORTHOPEDIC SURGERY | Age: 43
End: 2023-06-02

## 2023-06-02 DIAGNOSIS — Z96.651 STATUS POST RIGHT KNEE REPLACEMENT: Primary | ICD-10-CM

## 2023-06-02 DIAGNOSIS — Z09 FOLLOW-UP EXAMINATION FOLLOWING SURGERY: ICD-10-CM

## 2023-06-02 RX ORDER — HYDROMORPHONE HYDROCHLORIDE 2 MG/1
2 TABLET ORAL EVERY 4 HOURS PRN
Qty: 30 TABLET | Refills: 0 | Status: SHIPPED | OUTPATIENT
Start: 2023-06-02 | End: 2023-06-07

## 2023-06-02 RX ORDER — AMOXICILLIN 500 MG/1
TABLET, FILM COATED ORAL
Qty: 12 TABLET | Refills: 1 | Status: SHIPPED | OUTPATIENT
Start: 2023-06-02

## 2023-06-02 RX ORDER — CYCLOBENZAPRINE HCL 5 MG
5 TABLET ORAL 3 TIMES DAILY PRN
Qty: 30 TABLET | Refills: 0 | Status: SHIPPED | OUTPATIENT
Start: 2023-06-02 | End: 2023-06-12

## 2023-06-02 NOTE — PROGRESS NOTES
Patient ID:  Ralph Tinoco  [id number]  70 y.o.  1980    Today: June 2, 2023    CHIEF COMPLAINT:  Follow-up right total knee replacement    HISTORY:  The patient presents today for 3-week follow-up after knee replacement. The patient is progressing slowly, is on aspirin for DVT prophylaxis. The patient is working with physical therapy to regain some strength and motion. Continues to take medication appropriately. The patient has done a good job keeping dressing/wound clean and dry. PE:  Incision is examined which is well healed. No erythema, induration or drainage. No significant fluid accumulation around the surgical site. ROM is 15 to 70 degrees. Overall the knee appears stable to varus/valgus stress throughout arc of motion. Anterior drawer testing at 45 and 90º of flexion is stable. Posterior drawer testing is stable. Distally able to plantar and dorsiflex foot and ankle. Sensation intact. Limb is perfused. No sign of DVT. X-RAYS:    XR RT Knee 2/3 view  Views Obtained: AP, Lateral and Sunrise views of the right knee  Indication: Postop Right TKA  Findings: All hardware to be intact. All the components appear to be well sized without any evidence of loosening. Overall mechanical alignment appears to be within acceptable criteria. No evidence of fracture. Patella appears to be tracking appropriately within the trochlear groove. Impression: Normal Xray after right total knee replacement    ROSAURA LOPEZ CNP    ASSESSMENT:  3 Weeks S/P Right Total Knee Replacement    PLAN:  Continue activity and weight bearing as tolerated. Continue PT/OT to focus on strengthening and stretching. Continue to take pain medication appropriately. The patient will continue to take aspirin for another week for a full month of DVT prophylaxis. The patient is given a script for antibiotics to be taken before dental prophylaxis today.   Will continue to work with PT/OT transitioning

## 2023-06-07 DIAGNOSIS — G47.00 INSOMNIA, UNSPECIFIED TYPE: Primary | ICD-10-CM

## 2023-06-07 RX ORDER — ZOLPIDEM TARTRATE 10 MG/1
5 TABLET ORAL NIGHTLY PRN
Qty: 30 TABLET | Refills: 0 | Status: SHIPPED | OUTPATIENT
Start: 2023-06-07 | End: 2023-07-07

## 2023-06-15 ENCOUNTER — APPOINTMENT (OUTPATIENT)
Dept: PHYSICAL THERAPY | Age: 43
End: 2023-06-15
Payer: COMMERCIAL

## 2023-06-19 ENCOUNTER — HOSPITAL ENCOUNTER (OUTPATIENT)
Dept: PHYSICAL THERAPY | Age: 43
Setting detail: RECURRING SERIES
End: 2023-06-19
Payer: COMMERCIAL

## 2023-06-22 ENCOUNTER — HOSPITAL ENCOUNTER (OUTPATIENT)
Dept: PHYSICAL THERAPY | Age: 43
Setting detail: RECURRING SERIES
Discharge: HOME | End: 2023-06-25
Payer: COMMERCIAL

## 2023-06-22 PROCEDURE — 97110 THERAPEUTIC EXERCISES: CPT

## 2023-06-22 PROCEDURE — 97140 MANUAL THERAPY 1/> REGIONS: CPT

## 2023-06-22 ASSESSMENT — PAIN SCALES - GENERAL: PAINLEVEL_OUTOF10: 3

## 2023-06-28 DIAGNOSIS — G89.18 POSTOPERATIVE PAIN: Primary | ICD-10-CM

## 2023-06-28 DIAGNOSIS — G47.00 INSOMNIA, UNSPECIFIED TYPE: ICD-10-CM

## 2023-06-28 RX ORDER — HYDROCODONE BITARTRATE AND ACETAMINOPHEN 7.5; 325 MG/1; MG/1
1 TABLET ORAL EVERY 4 HOURS PRN
Qty: 40 TABLET | Refills: 0 | Status: SHIPPED | OUTPATIENT
Start: 2023-06-28 | End: 2023-06-30

## 2023-06-29 ENCOUNTER — HOSPITAL ENCOUNTER (OUTPATIENT)
Dept: PHYSICAL THERAPY | Age: 43
Setting detail: RECURRING SERIES
End: 2023-06-29
Payer: COMMERCIAL

## 2023-06-29 RX ORDER — ONDANSETRON 4 MG/1
4 TABLET, FILM COATED ORAL 3 TIMES DAILY PRN
Qty: 30 TABLET | Refills: 1 | Status: SHIPPED | OUTPATIENT
Start: 2023-06-29

## 2023-06-30 DIAGNOSIS — Z96.651 STATUS POST RIGHT KNEE REPLACEMENT: Primary | ICD-10-CM

## 2023-06-30 RX ORDER — HYDROMORPHONE HYDROCHLORIDE 2 MG/1
2 TABLET ORAL EVERY 4 HOURS PRN
Qty: 40 TABLET | Refills: 0 | OUTPATIENT
Start: 2023-06-30 | End: 2023-07-07

## 2023-06-30 RX ORDER — ZOLPIDEM TARTRATE 10 MG/1
5 TABLET ORAL NIGHTLY PRN
Qty: 30 TABLET | Refills: 0 | OUTPATIENT
Start: 2023-06-30 | End: 2023-07-30

## 2023-06-30 RX ORDER — HYDROCODONE BITARTRATE AND ACETAMINOPHEN 7.5; 325 MG/1; MG/1
1 TABLET ORAL EVERY 6 HOURS PRN
Qty: 20 TABLET | Refills: 0 | Status: SHIPPED | OUTPATIENT
Start: 2023-06-30 | End: 2023-07-05

## 2023-07-07 DIAGNOSIS — Z96.651 STATUS POST RIGHT KNEE REPLACEMENT: Primary | ICD-10-CM

## 2023-07-07 RX ORDER — HYDROCODONE BITARTRATE AND ACETAMINOPHEN 5; 325 MG/1; MG/1
1 TABLET ORAL EVERY 6 HOURS PRN
Qty: 20 TABLET | Refills: 0 | Status: SHIPPED | OUTPATIENT
Start: 2023-07-07 | End: 2023-07-12

## 2023-07-14 ENCOUNTER — OFFICE VISIT (OUTPATIENT)
Dept: ORTHOPEDIC SURGERY | Age: 43
End: 2023-07-14

## 2023-07-14 DIAGNOSIS — G89.18 POSTOPERATIVE PAIN: ICD-10-CM

## 2023-07-14 DIAGNOSIS — M25.569 KNEE PAIN, UNSPECIFIED CHRONICITY, UNSPECIFIED LATERALITY: Primary | ICD-10-CM

## 2023-07-14 RX ORDER — HYDROCODONE BITARTRATE AND ACETAMINOPHEN 5; 325 MG/1; MG/1
1 TABLET ORAL EVERY 4 HOURS PRN
Qty: 40 TABLET | Refills: 0 | Status: SHIPPED | OUTPATIENT
Start: 2023-07-14 | End: 2023-07-21

## 2023-07-14 NOTE — PROGRESS NOTES
Patient ID:  Trevor Chakraborty  [id number]  89 y.o.  1980    Today: July 14, 2023          CC:  Right Knee stiffness after arthroplasty    HPI:   The patient has stiffness of the right knee after arthroplasty. Patient was seen in office and range of motion was noted to be 0-95. At this time I feel physical therapy alone will not result in the range of motion the patient or I would hope for. We have discussed manipulation of the knee under sedation previously and the patient wishes to proceed. There have been no changes to the patient's orthopedic condition since the last office visit. Past Medical History:  Past Medical History:   Diagnosis Date    Asthma     daily inhaler, PRN inhaler--unsure of when last used    Fatty liver     Fibromyalgia     Former cigarette smoker     GERD (gastroesophageal reflux disease)     History of bladder cancer 2005    6 weeks of BCG--per PCP note \" She has not seen a urologist in several years, and would benefit from repeat cystoscopy or follow-up. \"    History of blood transfusion 2013    History of breast cancer     Tamoxifen for 3 years/ Right breast- lumpectomy    History of COVID-19 12/2020    pt hospitalized on BIPAP    History of gastric ulcer     PCP has referred pt to GI    Liver tumor     PCP has referred pt to GI--per PCP note \"Chronic. resolved. Pt reports s/p arterial embolization with \"cure\". No further imaging to elucidate this. Will refer to GI for further management and consider ordering RUQ US prior to appt with them \"    Mitral valve prolapse     echo (2/17/23) states \"The mitral valve is normal. No mitral valve stenosis. Trace mitral valve regurgitation. \"    PONV (postoperative nausea and vomiting)     post-op. Pt does well with antiemetic    Prolonged emergence from general anesthesia     Retention of urine     X1 with cervical fusion.     Seasonal allergies     SVT (supraventricular tachycardia) (HCC)     PCP referred pt to cardiology per note dated

## 2023-07-19 ENCOUNTER — HOSPITAL ENCOUNTER (OUTPATIENT)
Dept: PHYSICAL THERAPY | Age: 43
Setting detail: RECURRING SERIES
Discharge: HOME | End: 2023-07-22
Payer: COMMERCIAL

## 2023-07-19 ENCOUNTER — HOSPITAL ENCOUNTER (OUTPATIENT)
Dept: SURGERY | Age: 43
Discharge: HOME | End: 2023-07-22
Payer: COMMERCIAL

## 2023-07-19 ENCOUNTER — ANESTHESIA (OUTPATIENT)
Dept: SURGERY | Age: 43
End: 2023-07-19

## 2023-07-19 ENCOUNTER — ANESTHESIA EVENT (OUTPATIENT)
Dept: SURGERY | Age: 43
End: 2023-07-19

## 2023-07-19 VITALS
DIASTOLIC BLOOD PRESSURE: 67 MMHG | WEIGHT: 158.9 LBS | TEMPERATURE: 98 F | BODY MASS INDEX: 30 KG/M2 | HEIGHT: 61 IN | HEART RATE: 71 BPM | RESPIRATION RATE: 16 BRPM | SYSTOLIC BLOOD PRESSURE: 159 MMHG | OXYGEN SATURATION: 100 %

## 2023-07-19 DIAGNOSIS — M25.569 KNEE PAIN, UNSPECIFIED CHRONICITY, UNSPECIFIED LATERALITY: Primary | ICD-10-CM

## 2023-07-19 DIAGNOSIS — Z96.651 STATUS POST RIGHT KNEE REPLACEMENT: ICD-10-CM

## 2023-07-19 PROCEDURE — 6370000000 HC RX 637 (ALT 250 FOR IP): Performed by: ANESTHESIOLOGY

## 2023-07-19 PROCEDURE — 27570 FIXATION OF KNEE JOINT: CPT

## 2023-07-19 PROCEDURE — 6360000002 HC RX W HCPCS: Performed by: ANESTHESIOLOGY

## 2023-07-19 PROCEDURE — 97110 THERAPEUTIC EXERCISES: CPT

## 2023-07-19 PROCEDURE — 2580000003 HC RX 258: Performed by: ORTHOPAEDIC SURGERY

## 2023-07-19 RX ORDER — FENTANYL CITRATE 50 UG/ML
100 INJECTION, SOLUTION INTRAMUSCULAR; INTRAVENOUS
Status: COMPLETED | OUTPATIENT
Start: 2023-07-19 | End: 2023-07-19

## 2023-07-19 RX ORDER — SODIUM CHLORIDE, SODIUM LACTATE, POTASSIUM CHLORIDE, CALCIUM CHLORIDE 600; 310; 30; 20 MG/100ML; MG/100ML; MG/100ML; MG/100ML
INJECTION, SOLUTION INTRAVENOUS CONTINUOUS
Status: DISCONTINUED | OUTPATIENT
Start: 2023-07-19 | End: 2023-07-23 | Stop reason: HOSPADM

## 2023-07-19 RX ORDER — PROCHLORPERAZINE EDISYLATE 5 MG/ML
5 INJECTION INTRAMUSCULAR; INTRAVENOUS
Status: ACTIVE | OUTPATIENT
Start: 2023-07-19 | End: 2023-07-20

## 2023-07-19 RX ORDER — OXYCODONE HYDROCHLORIDE 5 MG/1
10 TABLET ORAL ONCE
Status: COMPLETED | OUTPATIENT
Start: 2023-07-19 | End: 2023-07-19

## 2023-07-19 RX ORDER — OXYCODONE HYDROCHLORIDE 5 MG/1
5 TABLET ORAL ONCE
Status: COMPLETED | OUTPATIENT
Start: 2023-07-19 | End: 2023-07-19

## 2023-07-19 RX ORDER — MIDAZOLAM HYDROCHLORIDE 2 MG/2ML
2 INJECTION, SOLUTION INTRAMUSCULAR; INTRAVENOUS
Status: COMPLETED | OUTPATIENT
Start: 2023-07-19 | End: 2023-07-19

## 2023-07-19 RX ORDER — SODIUM CHLORIDE 9 MG/ML
INJECTION, SOLUTION INTRAVENOUS PRN
Status: DISCONTINUED | OUTPATIENT
Start: 2023-07-19 | End: 2023-07-23 | Stop reason: HOSPADM

## 2023-07-19 RX ORDER — SODIUM CHLORIDE 0.9 % (FLUSH) 0.9 %
5-40 SYRINGE (ML) INJECTION PRN
Status: DISCONTINUED | OUTPATIENT
Start: 2023-07-19 | End: 2023-07-23 | Stop reason: HOSPADM

## 2023-07-19 RX ORDER — LIDOCAINE HYDROCHLORIDE 10 MG/ML
1 INJECTION, SOLUTION INFILTRATION; PERINEURAL
Status: ACTIVE | OUTPATIENT
Start: 2023-07-19 | End: 2023-07-20

## 2023-07-19 RX ORDER — DIPHENHYDRAMINE HYDROCHLORIDE 50 MG/ML
12.5 INJECTION INTRAMUSCULAR; INTRAVENOUS
Status: ACTIVE | OUTPATIENT
Start: 2023-07-19 | End: 2023-07-20

## 2023-07-19 RX ORDER — FAMOTIDINE 20 MG/1
20 TABLET, FILM COATED ORAL ONCE
Status: COMPLETED | OUTPATIENT
Start: 2023-07-19 | End: 2023-07-19

## 2023-07-19 RX ORDER — ROPIVACAINE HYDROCHLORIDE 2 MG/ML
INJECTION, SOLUTION EPIDURAL; INFILTRATION; PERINEURAL
Status: DISCONTINUED | OUTPATIENT
Start: 2023-07-19 | End: 2023-07-19 | Stop reason: SDUPTHER

## 2023-07-19 RX ORDER — LIDOCAINE HYDROCHLORIDE 20 MG/ML
INJECTION, SOLUTION EPIDURAL; INFILTRATION; INTRACAUDAL; PERINEURAL PRN
Status: DISCONTINUED | OUTPATIENT
Start: 2023-07-19 | End: 2023-07-19 | Stop reason: SDUPTHER

## 2023-07-19 RX ORDER — SODIUM CHLORIDE 0.9 % (FLUSH) 0.9 %
5-40 SYRINGE (ML) INJECTION EVERY 12 HOURS SCHEDULED
Status: DISCONTINUED | OUTPATIENT
Start: 2023-07-19 | End: 2023-07-23 | Stop reason: HOSPADM

## 2023-07-19 RX ORDER — PROPOFOL 10 MG/ML
INJECTION, EMULSION INTRAVENOUS PRN
Status: DISCONTINUED | OUTPATIENT
Start: 2023-07-19 | End: 2023-07-19 | Stop reason: SDUPTHER

## 2023-07-19 RX ADMIN — FAMOTIDINE 20 MG: 20 TABLET, FILM COATED ORAL at 06:48

## 2023-07-19 RX ADMIN — HYDROMORPHONE HYDROCHLORIDE 0.5 MG: 1 INJECTION, SOLUTION INTRAMUSCULAR; INTRAVENOUS; SUBCUTANEOUS at 07:19

## 2023-07-19 RX ADMIN — OXYCODONE HYDROCHLORIDE 5 MG: 5 TABLET ORAL at 07:33

## 2023-07-19 RX ADMIN — SODIUM CHLORIDE, POTASSIUM CHLORIDE, SODIUM LACTATE AND CALCIUM CHLORIDE: 600; 310; 30; 20 INJECTION, SOLUTION INTRAVENOUS at 06:12

## 2023-07-19 RX ADMIN — HYDROMORPHONE HYDROCHLORIDE 0.5 MG: 1 INJECTION, SOLUTION INTRAMUSCULAR; INTRAVENOUS; SUBCUTANEOUS at 07:24

## 2023-07-19 RX ADMIN — MIDAZOLAM 2 MG: 1 INJECTION INTRAMUSCULAR; INTRAVENOUS at 06:30

## 2023-07-19 RX ADMIN — LIDOCAINE HYDROCHLORIDE 60 MG: 20 INJECTION, SOLUTION EPIDURAL; INFILTRATION; INTRACAUDAL; PERINEURAL at 06:58

## 2023-07-19 RX ADMIN — FENTANYL CITRATE 100 MCG: 50 INJECTION INTRAMUSCULAR; INTRAVENOUS at 06:30

## 2023-07-19 RX ADMIN — PROPOFOL 170 MG: 10 INJECTION, EMULSION INTRAVENOUS at 06:58

## 2023-07-19 RX ADMIN — ROPIVACAINE HYDROCHLORIDE 20 ML: 2 INJECTION, SOLUTION EPIDURAL; INFILTRATION; PERINEURAL at 06:30

## 2023-07-19 RX ADMIN — HYDROMORPHONE HYDROCHLORIDE 0.5 MG: 1 INJECTION, SOLUTION INTRAMUSCULAR; INTRAVENOUS; SUBCUTANEOUS at 07:14

## 2023-07-19 ASSESSMENT — PAIN SCALES - GENERAL
PAINLEVEL_OUTOF10: 10
PAINLEVEL_OUTOF10: 4
PAINLEVEL_OUTOF10: 4

## 2023-07-19 ASSESSMENT — PAIN - FUNCTIONAL ASSESSMENT: PAIN_FUNCTIONAL_ASSESSMENT: 0-10

## 2023-07-19 ASSESSMENT — PAIN DESCRIPTION - DESCRIPTORS: DESCRIPTORS: ACHING

## 2023-07-19 ASSESSMENT — PAIN DESCRIPTION - LOCATION: LOCATION: KNEE

## 2023-07-19 NOTE — DISCHARGE INSTRUCTIONS
Knee manipulation is a procedure to treat knee stiffness and decreased range of motion. After trauma or knee surgery, scar tissue can form in your joint. The scar tissue does not allow you to fully bend or straighten your leg. Knee manipulation breaks up the scar tissue that has formed. DISCHARGE INSTRUCTIONS:    Seek care immediately if:  You cannot move your knee. Your pain and swelling suddenly become worse. You have numbness or tingling in your leg or foot. Contact your healthcare provider if:  Your pain does not get better after you take pain medicine. You have questions or concerns about your condition or care. Medicines:  Prescription pain medicine may be given. Ask your healthcare provider how to take this medicine safely. Take your medicine as directed. Contact your healthcare provider if you think your medicine is not helping or if you have side effects. Self-care:  Apply ice on your knee for 15 to 20 minutes every hour or as directed. -Ice helps prevent tissue damage and decreases swelling and pain. - Use an ice pack, or put crushed ice in a plastic bag. Cover it with a towel before you apply it to your skin. Elevate your knee above the level of your heart as often as you can. This will help decrease swelling and pain.     -If possible, prop your knee on pillows or blankets to keep it elevated comfortably. Do your knee exercises as directed. You will be taught exercises to help strengthen your knee. -Exercises will also prevent stiffness and increase your range of motion. Physical therapy:  You may start physical therapy the day of your procedure. - A physical therapist teaches you exercises to help improve movement and strength, and to decrease pain.     -Take pain medicine 1 hour before you go to physical therapy. This will make it easier to exercise your knee.      MEDICATION INTERACTION:    During your procedure you potentially received a medication or medications

## 2023-07-19 NOTE — PROGRESS NOTES
Ansley Trinidad  : 1980  Primary: Lily Rosado ([facility])  Secondary:  [address] [facility] [address]  Phone: [phone]  Fax: [phone] Plan Frequency: 2 x per week for 6-8 weeks    Plan of Care/Certification Expiration Date: 23      >PT Visit Info:  Plan Frequency: 2 x per week for 6-8 weeks  Plan of Care/Certification Expiration Date: 23      Visit Count:  3    OUTPATIENT PHYSICAL THERAPY:OP NOTE TYPE: Treatment Note 2023       Episode  }Appt Desk             Treatment Diagnosis:  Pain in Right Knee (M25.561)  Stiffness of Right Knee, Not elsewhere classified (M25.661)  Other abnormalities of gait and mobility (R26.89)  Medical/Referring Diagnosis:  No admission diagnoses are documented for this encounter. Referring Physician:  Moustapha Saini MD MD Orders:  PT Eval and Treat   Date of Onset:  Onset Date: 05/10/23     Allergies:   Morphine and Sulfa antibiotics  Restrictions/Precautions:  Restrictions/Precautions: Weight Bearing  Right Lower Extremity Weight Bearing: Weight Bearing As Tolerated     Interventions Planned (Treatment may consist of any combination of the following):    Current Treatment Recommendations: ROM; Strengthening; Balance training; Functional mobility training; Transfer training; Endurance training; Neuromuscular re-education; Manual; Home exercise program; Pain management; Modalities; Positioning; Therapeutic activities     >Subjective Comments: \"pretty drugged up\" right now from the procedure so she's not feeling too bad today. >Initial:      /10>Post Session:       3 /10  Medications Last Reviewed:  2023  Updated Objective Findings:   (-9) - 91 deg AROM ---> 100 deg after heel slides  Treatment   THERAPEUTIC EXERCISE: (45 minutes):    Exercises per grid below to improve mobility, strength, balance, and coordination.   Required moderate visual, verbal, manual, and tactile cues to promote

## 2023-07-19 NOTE — ANESTHESIA PRE PROCEDURE
breast cancer. .    Cancer: h/o bladder and breast cancer. ROS comment:    Abdominal:             Vascular: negative vascular ROS. Other Findings:             Anesthesia Plan      TIVA     ASA 3     (Spinal + TIVA + nerve block)  Induction: intravenous. MIPS: Postoperative opioids intended and Prophylactic antiemetics administered. Anesthetic plan and risks discussed with patient. Use of blood products discussed with patient and spouse whom consented to blood products.            Post-op pain plan if not by surgeon: single peripheral nerve block            Rosevelt Phoenix, MD   7/19/2023

## 2023-07-19 NOTE — ANESTHESIA PROCEDURE NOTES
Peripheral Block    Patient location during procedure: pre-op  Reason for block: post-op pain management and at surgeon's request  Start time: 7/19/2023 6:30 AM  End time: 7/19/2023 6:33 AM  Staffing  Performed: anesthesiologist   Anesthesiologist: Elizabeth Jordan MD  Preanesthetic Checklist  Completed: patient identified, IV checked, site marked, risks and benefits discussed, surgical/procedural consents, equipment checked, pre-op evaluation, timeout performed, anesthesia consent given, oxygen available and monitors applied/VS acknowledged  Peripheral Block   Patient position: supine  Prep: ChloraPrep  Provider prep: mask and sterile gloves  Patient monitoring: cardiac monitor, continuous pulse ox, frequent blood pressure checks, IV access, oxygen and responsive to questions  Block type: Femoral  Adductor canal  Laterality: right  Injection technique: single-shot  Guidance: ultrasound guided    Needle   Needle type: insulated echogenic nerve stimulator needle   Needle gauge: 20 G  Needle localization: ultrasound guidance  Needle length: 10 cm  Assessment   Injection assessment: negative aspiration for heme, no paresthesia on injection, no intravascular symptoms and local visualized surrounding nerve on ultrasound  Slow fractionated injection: yes  Hemodynamics: stable  Real-time US image taken/store: yes  Outcomes: patient tolerated procedure well and uncomplicated    Additional Notes  3 cc 1% lidocaine local at needle insertion site. Risks discussed including damage to muscle or nerve. Needle inserted and placed in close proximity to the nerve under real time ultrasound guidance. Ultrasound was used to visualize the spread of local anesthetic in close proximity to the nerve being blocked. All structures appeared anatomically normal and there were no abnormal findings. Ultrasound imaged printed and placed on chart.     Medications Administered  ropivacaine (NAROPIN) injection 0.2% - Perineural   20 mL -

## 2023-07-19 NOTE — PERIOP NOTE
MD Gurpreet Jimenez  at bedside with patient. Pt VSS stable. Pain and Nausea controlled at this time. Verbal sign out per MD when pacu care is completed. Plan of care continues.

## 2023-07-21 RX ORDER — TIZANIDINE 4 MG/1
4 TABLET ORAL 3 TIMES DAILY
Qty: 30 TABLET | Refills: 1 | Status: SHIPPED | OUTPATIENT
Start: 2023-07-21

## 2023-08-01 ENCOUNTER — OFFICE VISIT (OUTPATIENT)
Dept: ORTHOPEDIC SURGERY | Age: 43
End: 2023-08-01

## 2023-08-01 DIAGNOSIS — Z96.651 STATUS POST RIGHT KNEE REPLACEMENT: Primary | ICD-10-CM

## 2023-08-01 PROCEDURE — 99024 POSTOP FOLLOW-UP VISIT: CPT | Performed by: ORTHOPAEDIC SURGERY

## 2023-08-01 RX ORDER — HYDROCODONE BITARTRATE AND ACETAMINOPHEN 5; 325 MG/1; MG/1
1 TABLET ORAL EVERY 6 HOURS PRN
Qty: 20 TABLET | Refills: 0 | Status: SHIPPED | OUTPATIENT
Start: 2023-08-01 | End: 2023-08-06

## 2023-08-01 NOTE — PROGRESS NOTES
CC: First postoperative visit following manipulation      History: The patient returns today for follow-up. They have undergone closed manipulation and have made improvement. The ROM is now 0 to 100 degrees. They are still having some discomfort and pain still requiring some pain medication. They are ambulatory, weight bearing as tolerated. Physical Exam:      General:  On exam the patient is a pleasant and no acute distress, A&O x 3. They are ambulatory with no appreciable limp. Knee: The incision is well healed. There is no swelling and increased warmth. ROM is 0 to 100 degrees. There is no M/L or A/P instability. The calf is soft and non-tender. Neurologic and vascular exams are intact. Impression and Plan: The patient is doing well status post manipulation. The patient is to continue with physical therapy, continue with present level of activity. The patient is to return here in 10 weeks for follow up or sooner if needed.        Chino Reardon, APRN - CNP

## 2023-08-15 DIAGNOSIS — G89.18 POSTOPERATIVE PAIN: Primary | ICD-10-CM

## 2023-08-15 DIAGNOSIS — M25.569 KNEE PAIN, UNSPECIFIED CHRONICITY, UNSPECIFIED LATERALITY: ICD-10-CM

## 2023-08-15 DIAGNOSIS — Z96.651 STATUS POST RIGHT KNEE REPLACEMENT: ICD-10-CM

## 2023-08-15 RX ORDER — TRAMADOL HYDROCHLORIDE 50 MG/1
50 TABLET ORAL EVERY 4 HOURS PRN
Qty: 42 TABLET | Refills: 0 | Status: SHIPPED | OUTPATIENT
Start: 2023-08-15 | End: 2023-08-22

## 2023-08-15 RX ORDER — TIZANIDINE 4 MG/1
4 TABLET ORAL 3 TIMES DAILY
Qty: 30 TABLET | Refills: 1 | OUTPATIENT
Start: 2023-08-15

## 2023-09-08 DIAGNOSIS — G89.18 POSTOPERATIVE PAIN: Primary | ICD-10-CM

## 2023-09-08 RX ORDER — TRAMADOL HYDROCHLORIDE 50 MG/1
50 TABLET ORAL EVERY 4 HOURS PRN
Qty: 42 TABLET | Refills: 0 | Status: SHIPPED | OUTPATIENT
Start: 2023-09-08 | End: 2023-09-15

## 2023-10-09 ENCOUNTER — PATIENT MESSAGE (OUTPATIENT)
Dept: ORTHOPEDIC SURGERY | Age: 43
End: 2023-10-09

## 2023-10-09 DIAGNOSIS — Z96.651 HISTORY OF TOTAL RIGHT KNEE REPLACEMENT: Primary | ICD-10-CM

## 2023-10-09 RX ORDER — AMOXICILLIN 500 MG/1
CAPSULE ORAL
Qty: 4 CAPSULE | Refills: 5 | Status: SHIPPED | OUTPATIENT
Start: 2023-10-09

## 2023-10-16 DIAGNOSIS — Z96.651 STATUS POST RIGHT KNEE REPLACEMENT: Primary | ICD-10-CM

## 2023-10-16 RX ORDER — TRAMADOL HYDROCHLORIDE 50 MG/1
50 TABLET ORAL EVERY 4 HOURS PRN
Qty: 42 TABLET | Refills: 0 | Status: SHIPPED | OUTPATIENT
Start: 2023-10-16 | End: 2023-10-23

## 2023-10-24 ENCOUNTER — OFFICE VISIT (OUTPATIENT)
Dept: ORTHOPEDIC SURGERY | Age: 43
End: 2023-10-24
Payer: COMMERCIAL

## 2023-10-24 DIAGNOSIS — Z09 FOLLOW-UP EXAMINATION FOLLOWING SURGERY: Primary | ICD-10-CM

## 2023-10-24 PROCEDURE — 99213 OFFICE O/P EST LOW 20 MIN: CPT | Performed by: ORTHOPAEDIC SURGERY

## 2023-10-24 NOTE — PROGRESS NOTES
Patient ID:  Celine Morocho  [id number]  83 y.o.  1980    Today: October 24, 2023    CHIEF COMPLAINT:  Follow-up right total knee replacement    HISTORY:  The patient presents today approximately 5 months after knee replacement. The patient continues to have issues with motion. She underwent manipulation after surgery at which time we were able to get 130 degrees of flexion from the knee with very little effort or audible breaking of adhesions. Past Medical History:  Past Medical History:   Diagnosis Date    Asthma     daily inhaler, PRN inhaler--unsure of when last used    Fatty liver     Fibromyalgia     Former cigarette smoker     GERD (gastroesophageal reflux disease)     History of bladder cancer 2005    6 weeks of BCG--per PCP note \" She has not seen a urologist in several years, and would benefit from repeat cystoscopy or follow-up. \"    History of blood transfusion 2013    History of breast cancer     Tamoxifen for 3 years/ Right breast- lumpectomy    History of COVID-19 12/2020    pt hospitalized on BIPAP    History of gastric ulcer     PCP has referred pt to GI    Liver tumor     PCP has referred pt to GI--per PCP note \"Chronic. resolved. Pt reports s/p arterial embolization with \"cure\". No further imaging to elucidate this. Will refer to GI for further management and consider ordering RUQ US prior to appt with them \"    Mitral valve prolapse     echo (2/17/23) states \"The mitral valve is normal. No mitral valve stenosis. Trace mitral valve regurgitation. \"    PONV (postoperative nausea and vomiting)     post-op. Pt does well with antiemetic    Prolonged emergence from general anesthesia     Retention of urine     X1 with cervical fusion.     Seasonal allergies     SVT (supraventricular tachycardia) (HCC)     PCP referred pt to cardiology per note dated 2/9/23       Past Surgical History:  Past Surgical History:   Procedure Laterality Date    APPENDECTOMY      ARM SURGERY Right 06/27/2022

## 2023-11-29 DIAGNOSIS — Z96.651 STATUS POST RIGHT KNEE REPLACEMENT: Primary | ICD-10-CM

## 2023-11-29 RX ORDER — TRAMADOL HYDROCHLORIDE 50 MG/1
50 TABLET ORAL EVERY 4 HOURS PRN
Qty: 42 TABLET | Refills: 0 | Status: SHIPPED | OUTPATIENT
Start: 2023-11-29 | End: 2023-12-06

## 2023-12-06 ENCOUNTER — TELEPHONE (OUTPATIENT)
Dept: NEUROLOGY | Age: 43
End: 2023-12-06

## 2023-12-06 DIAGNOSIS — G43.809 OTHER MIGRAINE, NOT INTRACTABLE, WITHOUT STATUS MIGRAINOSUS: ICD-10-CM

## 2023-12-06 DIAGNOSIS — M54.2 CERVICALGIA: ICD-10-CM

## 2023-12-06 RX ORDER — AMITRIPTYLINE HYDROCHLORIDE 10 MG/1
20 TABLET, FILM COATED ORAL NIGHTLY
Qty: 180 TABLET | Refills: 1 | Status: SHIPPED | OUTPATIENT
Start: 2023-12-06

## 2023-12-06 NOTE — TELEPHONE ENCOUNTER
I reached out to patient to schedule an appt with PAIGE and while on the phone, she informed me she was out a medication, but couldn't remember the exact name. She said her pharmacy had already sent us a request for the refill. She also said Dr. Prabhakar Winn gave her samples of a different medication at her last appt that helped tremendously, but she can't remember what that one is called either (I believe it's Nurtec). Patient would like to know if either of these medications can be refilled before her first appt with PAIGE in April.

## 2024-01-10 DIAGNOSIS — R52 PAIN: Primary | ICD-10-CM

## 2024-01-10 RX ORDER — TRAMADOL HYDROCHLORIDE 50 MG/1
50 TABLET ORAL EVERY 4 HOURS PRN
Qty: 42 TABLET | Refills: 0 | Status: SHIPPED | OUTPATIENT
Start: 2024-01-10 | End: 2024-01-17

## 2024-01-30 ENCOUNTER — OFFICE VISIT (OUTPATIENT)
Dept: ORTHOPEDIC SURGERY | Age: 44
End: 2024-01-30
Payer: COMMERCIAL

## 2024-01-30 DIAGNOSIS — M25.561 RIGHT KNEE PAIN, UNSPECIFIED CHRONICITY: ICD-10-CM

## 2024-01-30 DIAGNOSIS — Z09 FOLLOW-UP EXAMINATION FOLLOWING SURGERY: ICD-10-CM

## 2024-01-30 DIAGNOSIS — M65.9 TENOSYNOVITIS: Primary | ICD-10-CM

## 2024-01-30 DIAGNOSIS — M25.561 RIGHT KNEE PAIN, UNSPECIFIED CHRONICITY: Primary | ICD-10-CM

## 2024-01-30 DIAGNOSIS — Z96.651 STATUS POST RIGHT KNEE REPLACEMENT: ICD-10-CM

## 2024-01-30 PROCEDURE — 99213 OFFICE O/P EST LOW 20 MIN: CPT | Performed by: NURSE PRACTITIONER

## 2024-01-30 RX ORDER — PREDNISONE 10 MG/1
10 TABLET ORAL 2 TIMES DAILY
Qty: 10 TABLET | Refills: 0 | Status: SHIPPED | OUTPATIENT
Start: 2024-01-30 | End: 2024-02-04

## 2024-01-30 NOTE — PROGRESS NOTES
Patient ID:  Jose Sainz  [id number]  43 y.o.  1980    Today: January 30, 2024    CHIEF COMPLAINT:  Follow-up right total knee replacement    HISTORY:  The patient presents today for follow up of left TKA.  She had a left TKA on 5/10/23 followed by a FATUMA on 7/19/23.  She continues to have issues with stiffness and pain that seems to be worse since having the manipulation.  Patient symptoms are worse when initially getting up from long periods of sitting that improve somewhat after she walks for a little while. She continues to take Tramadol for pain.  She is using a cane today.    Past Medical History:  Past Medical History:   Diagnosis Date    Asthma     daily inhaler, PRN inhaler--unsure of when last used    Fatty liver     Fibromyalgia     Former cigarette smoker     GERD (gastroesophageal reflux disease)     History of bladder cancer 2005    6 weeks of BCG--per PCP note \" She has not seen a urologist in several years, and would benefit from repeat cystoscopy or follow-up. \"    History of blood transfusion 2013    History of breast cancer     Tamoxifen for 3 years/ Right breast- lumpectomy    History of COVID-19 12/2020    pt hospitalized on BIPAP    History of gastric ulcer     PCP has referred pt to GI    Liver tumor     PCP has referred pt to GI--per PCP note \"Chronic. resolved. Pt reports s/p arterial embolization with \"cure\". No further imaging to elucidate this. Will refer to GI for further management and consider ordering RUQ US prior to appt with them \"    Mitral valve prolapse     echo (2/17/23) states \"The mitral valve is normal. No mitral valve stenosis. Trace mitral valve regurgitation. \"    PONV (postoperative nausea and vomiting)     post-op. Pt does well with antiemetic    Prolonged emergence from general anesthesia     Retention of urine     X1 with cervical fusion.    Seasonal allergies     SVT (supraventricular tachycardia) (HCC)     PCP referred pt to cardiology per note dated

## 2024-02-20 DIAGNOSIS — Z96.651 STATUS POST RIGHT KNEE REPLACEMENT: Primary | ICD-10-CM

## 2024-02-20 RX ORDER — TRAMADOL HYDROCHLORIDE 50 MG/1
50 TABLET ORAL EVERY 4 HOURS PRN
Qty: 42 TABLET | Refills: 0 | Status: SHIPPED | OUTPATIENT
Start: 2024-02-20 | End: 2024-02-27

## 2024-03-22 DIAGNOSIS — Z96.651 STATUS POST RIGHT KNEE REPLACEMENT: Primary | ICD-10-CM

## 2024-03-22 RX ORDER — TRAMADOL HYDROCHLORIDE 50 MG/1
50 TABLET ORAL EVERY 4 HOURS PRN
Qty: 42 TABLET | Refills: 0 | Status: SHIPPED | OUTPATIENT
Start: 2024-03-22 | End: 2024-03-29

## 2024-05-17 DIAGNOSIS — R52 PAIN: Primary | ICD-10-CM

## 2024-05-17 RX ORDER — TRAMADOL HYDROCHLORIDE 50 MG/1
50 TABLET ORAL EVERY 4 HOURS PRN
Qty: 42 TABLET | Refills: 0 | Status: SHIPPED | OUTPATIENT
Start: 2024-05-17 | End: 2024-05-24

## 2024-05-29 DIAGNOSIS — M54.2 CERVICALGIA: ICD-10-CM

## 2024-05-29 DIAGNOSIS — G43.809 OTHER MIGRAINE, NOT INTRACTABLE, WITHOUT STATUS MIGRAINOSUS: ICD-10-CM

## 2024-05-29 RX ORDER — AMITRIPTYLINE HYDROCHLORIDE 10 MG/1
TABLET, FILM COATED ORAL
Qty: 180 TABLET | Refills: 1 | OUTPATIENT
Start: 2024-05-29

## 2024-06-18 DIAGNOSIS — M54.2 CERVICALGIA: ICD-10-CM

## 2024-06-18 DIAGNOSIS — G43.809 OTHER MIGRAINE, NOT INTRACTABLE, WITHOUT STATUS MIGRAINOSUS: ICD-10-CM

## 2024-06-18 RX ORDER — AMITRIPTYLINE HYDROCHLORIDE 10 MG/1
TABLET, FILM COATED ORAL
Qty: 180 TABLET | Refills: 1 | OUTPATIENT
Start: 2024-06-18

## 2024-07-16 ENCOUNTER — APPOINTMENT (OUTPATIENT)
Dept: CT IMAGING | Age: 44
End: 2024-07-16
Payer: COMMERCIAL

## 2024-07-16 ENCOUNTER — APPOINTMENT (OUTPATIENT)
Dept: GENERAL RADIOLOGY | Age: 44
End: 2024-07-16
Payer: COMMERCIAL

## 2024-07-16 ENCOUNTER — HOSPITAL ENCOUNTER (EMERGENCY)
Age: 44
Discharge: HOME | End: 2024-07-16
Payer: COMMERCIAL

## 2024-07-16 VITALS
TEMPERATURE: 99 F | DIASTOLIC BLOOD PRESSURE: 77 MMHG | SYSTOLIC BLOOD PRESSURE: 120 MMHG | OXYGEN SATURATION: 95 % | WEIGHT: 155 LBS | BODY MASS INDEX: 29.27 KG/M2 | HEART RATE: 104 BPM | RESPIRATION RATE: 20 BRPM | HEIGHT: 61 IN

## 2024-07-16 DIAGNOSIS — S39.012A STRAIN OF LUMBAR REGION, INITIAL ENCOUNTER: Primary | ICD-10-CM

## 2024-07-16 DIAGNOSIS — M51.36 DDD (DEGENERATIVE DISC DISEASE), LUMBAR: ICD-10-CM

## 2024-07-16 LAB
APPEARANCE UR: CLEAR
BACTERIA URNS QL MICRO: NORMAL /HPF
BILIRUB UR QL: NEGATIVE
COLOR UR: YELLOW
EPI CELLS #/AREA URNS HPF: 0 /HPF
GLUCOSE UR STRIP.AUTO-MCNC: NEGATIVE MG/DL
HGB UR QL STRIP: ABNORMAL
KETONES UR QL STRIP.AUTO: NEGATIVE MG/DL
LEUKOCYTE ESTERASE UR QL STRIP.AUTO: NEGATIVE
MUCOUS THREADS URNS QL MICRO: 0 /LPF
NITRITE UR QL STRIP.AUTO: NEGATIVE
OTHER OBSERVATIONS: NORMAL
PH UR STRIP: 7 (ref 5–9)
PROT UR STRIP-MCNC: NEGATIVE MG/DL
RBC #/AREA URNS HPF: NORMAL /HPF
SP GR UR REFRACTOMETRY: 1.01 (ref 1–1.02)
UROBILINOGEN UR QL STRIP.AUTO: 0.2 EU/DL (ref 0.2–1)
WBC URNS QL MICRO: 0 /HPF

## 2024-07-16 PROCEDURE — 87086 URINE CULTURE/COLONY COUNT: CPT

## 2024-07-16 PROCEDURE — 73502 X-RAY EXAM HIP UNI 2-3 VIEWS: CPT

## 2024-07-16 PROCEDURE — 72070 X-RAY EXAM THORAC SPINE 2VWS: CPT

## 2024-07-16 PROCEDURE — 6360000002 HC RX W HCPCS: Performed by: PHYSICIAN ASSISTANT

## 2024-07-16 PROCEDURE — 72131 CT LUMBAR SPINE W/O DYE: CPT

## 2024-07-16 PROCEDURE — 72100 X-RAY EXAM L-S SPINE 2/3 VWS: CPT

## 2024-07-16 PROCEDURE — 81001 URINALYSIS AUTO W/SCOPE: CPT

## 2024-07-16 PROCEDURE — 96372 THER/PROPH/DIAG INJ SC/IM: CPT

## 2024-07-16 PROCEDURE — 99284 EMERGENCY DEPT VISIT MOD MDM: CPT

## 2024-07-16 PROCEDURE — 6370000000 HC RX 637 (ALT 250 FOR IP): Performed by: PHYSICIAN ASSISTANT

## 2024-07-16 RX ORDER — HYDROMORPHONE HYDROCHLORIDE 1 MG/ML
0.5 INJECTION, SOLUTION INTRAMUSCULAR; INTRAVENOUS; SUBCUTANEOUS ONCE
Status: COMPLETED | OUTPATIENT
Start: 2024-07-16 | End: 2024-07-16

## 2024-07-16 RX ORDER — DEXAMETHASONE SODIUM PHOSPHATE 10 MG/ML
10 INJECTION INTRAMUSCULAR; INTRAVENOUS
Status: COMPLETED | OUTPATIENT
Start: 2024-07-16 | End: 2024-07-16

## 2024-07-16 RX ORDER — PREDNISONE 10 MG/1
TABLET ORAL
Qty: 45 TABLET | Refills: 0 | Status: SHIPPED | OUTPATIENT
Start: 2024-07-16 | End: 2024-07-30

## 2024-07-16 RX ORDER — HYDROCODONE BITARTRATE AND ACETAMINOPHEN 5; 325 MG/1; MG/1
1 TABLET ORAL EVERY 6 HOURS PRN
Qty: 8 TABLET | Refills: 0 | Status: SHIPPED | OUTPATIENT
Start: 2024-07-16 | End: 2024-07-19

## 2024-07-16 RX ORDER — METHOCARBAMOL 500 MG/1
500 TABLET, FILM COATED ORAL 3 TIMES DAILY PRN
Qty: 30 TABLET | Refills: 0 | Status: SHIPPED | OUTPATIENT
Start: 2024-07-16 | End: 2024-07-26

## 2024-07-16 RX ORDER — KETOROLAC TROMETHAMINE 30 MG/ML
60 INJECTION, SOLUTION INTRAMUSCULAR; INTRAVENOUS ONCE
Status: COMPLETED | OUTPATIENT
Start: 2024-07-16 | End: 2024-07-16

## 2024-07-16 RX ORDER — DIAZEPAM 5 MG/1
5 TABLET ORAL ONCE
Status: COMPLETED | OUTPATIENT
Start: 2024-07-16 | End: 2024-07-16

## 2024-07-16 RX ADMIN — KETOROLAC TROMETHAMINE 60 MG: 60 INJECTION, SOLUTION INTRAMUSCULAR at 11:41

## 2024-07-16 RX ADMIN — HYDROMORPHONE HYDROCHLORIDE 0.5 MG: 1 INJECTION, SOLUTION INTRAMUSCULAR; INTRAVENOUS; SUBCUTANEOUS at 11:36

## 2024-07-16 RX ADMIN — DIAZEPAM 5 MG: 5 TABLET ORAL at 11:36

## 2024-07-16 RX ADMIN — DEXAMETHASONE SODIUM PHOSPHATE 10 MG: 10 INJECTION INTRAMUSCULAR; INTRAVENOUS at 11:36

## 2024-07-16 ASSESSMENT — PAIN - FUNCTIONAL ASSESSMENT
PAIN_FUNCTIONAL_ASSESSMENT: 0-10
PAIN_FUNCTIONAL_ASSESSMENT: PREVENTS OR INTERFERES SOME ACTIVE ACTIVITIES AND ADLS

## 2024-07-16 ASSESSMENT — PAIN DESCRIPTION - DESCRIPTORS: DESCRIPTORS: SHARP;SHOOTING

## 2024-07-16 ASSESSMENT — LIFESTYLE VARIABLES
HOW OFTEN DO YOU HAVE A DRINK CONTAINING ALCOHOL: MONTHLY OR LESS
HOW MANY STANDARD DRINKS CONTAINING ALCOHOL DO YOU HAVE ON A TYPICAL DAY: 1 OR 2

## 2024-07-16 ASSESSMENT — PAIN SCALES - GENERAL
PAINLEVEL_OUTOF10: 10
PAINLEVEL_OUTOF10: 8

## 2024-07-16 ASSESSMENT — PAIN DESCRIPTION - LOCATION: LOCATION: BACK

## 2024-07-16 ASSESSMENT — PAIN DESCRIPTION - ORIENTATION: ORIENTATION: RIGHT

## 2024-07-16 NOTE — ED PROVIDER NOTES
Emergency Department Provider Note       PCP: Sri Chaidez MD   Age: 43 y.o.   Sex: female     DISPOSITION Decision To Discharge 07/16/2024 03:06:12 PM       ICD-10-CM    1. Strain of lumbar region, initial encounter  S39.012A HYDROcodone-acetaminophen (NORCO) 5-325 MG per tablet      2. DDD (degenerative disc disease), lumbar  M51.36           Medical Decision Making     1:00 PM patient was updated about the x-ray findings and the need for CT based on the questionable lucency which more than likely is bowel gas.  She states she is starting to feel better from the medication that was given here.  Patient's exam and CT's are reassuring.  There is no emergent indication at this time for further evaluation at this time in the emergency department. She is neurologically intact. Her exam was reassuring. She was advised to follow-up with her primary care physician for recheck, a chiropractor for symptomatic care as needed and symptomatic treatment with muscle relaxer, prednisone and la few Norco were prescribed. She was instructed on warm moist heat, massage and stretching multiple times a day.  Return immediately if worsening in any way.  We discussed signs and symptoms that would need emergent evaluation and she expressed understanding.  Patient is stable for discharge and ambulatory out of the ED without difficulty at this time.       1 or more acute illnesses that pose a threat to life or bodily function.   Over the counter drug management performed.  Prescription drug management performed.  Shared medical decision making was utilized in creating the patients health plan today.  Considerations: The following items were considered but not ordered: further evaluation.    I independently ordered and reviewed each unique test.  I reviewed external records: ED visit note from an outside group.   The patients assessment required an independent historian: daughter.  The reason they were needed is important historical

## 2024-07-16 NOTE — ED TRIAGE NOTES
Pt states that she tripped over her dog last night and landed on her back.  Having pain radiating into her pelvis area and down into her groin

## 2024-07-16 NOTE — ED NOTES
Patient mobility status  with mild difficulty. Provider aware     I have reviewed discharge instructions with the patient.  The patient verbalized understanding.    Patient left ED via Discharge Method: ambulatory to Home with Extended Family:.    Opportunity for questions and clarification provided.     Patient given 3 scripts.

## 2024-07-16 NOTE — DISCHARGE INSTRUCTIONS
The urine pH was elevated today and you had trace bacteria therefore I have sent your urine for culture.  This will take 4 to 5 days and if it does show that you do need antibiotics, we will call you and let you know.  Drink plenty water.  For the back, use warm moist heat, stretching multiple times a day, massage and take the medication as directed.  Follow-up with primary care for recheck.  You can also follow-up with orthopedics for recheck since you are an established patient of Dr. Elias, you can call and make an appointment with [facility] for follow-up.  Return immediately if you are worsening in any way.  You are not able to drive home due to the medications you have had here in the ED, make sure your daughter is driving you home.

## 2024-07-18 LAB
BACTERIA SPEC CULT: NORMAL
SERVICE CMNT-IMP: NORMAL

## 2024-07-30 ENCOUNTER — TELEPHONE (OUTPATIENT)
Dept: ORTHOPEDIC SURGERY | Age: 44
End: 2024-07-30

## 2024-07-30 ENCOUNTER — OFFICE VISIT (OUTPATIENT)
Age: 44
End: 2024-07-30
Payer: COMMERCIAL

## 2024-07-30 ENCOUNTER — PREP FOR PROCEDURE (OUTPATIENT)
Dept: ORTHOPEDIC SURGERY | Age: 44
End: 2024-07-30

## 2024-07-30 ENCOUNTER — OFFICE VISIT (OUTPATIENT)
Dept: ORTHOPEDIC SURGERY | Age: 44
End: 2024-07-30
Payer: COMMERCIAL

## 2024-07-30 ENCOUNTER — ANESTHESIA EVENT (OUTPATIENT)
Dept: SURGERY | Age: 44
End: 2024-07-30
Payer: COMMERCIAL

## 2024-07-30 DIAGNOSIS — Z96.651 PRESENCE OF RIGHT ARTIFICIAL KNEE JOINT: Primary | ICD-10-CM

## 2024-07-30 DIAGNOSIS — M25.561 RIGHT KNEE PAIN, UNSPECIFIED CHRONICITY: Primary | ICD-10-CM

## 2024-07-30 DIAGNOSIS — M65.331 ACQUIRED TRIGGER FINGER OF RIGHT MIDDLE FINGER: Primary | ICD-10-CM

## 2024-07-30 DIAGNOSIS — Z01.818 PRE-OP EVALUATION: Primary | ICD-10-CM

## 2024-07-30 DIAGNOSIS — M06.9 RHEUMATOID ARTHRITIS OF HAND, UNSPECIFIED LATERALITY, UNSPECIFIED WHETHER RHEUMATOID FACTOR PRESENT (HCC): ICD-10-CM

## 2024-07-30 DIAGNOSIS — G89.18 POSTOPERATIVE PAIN: Primary | ICD-10-CM

## 2024-07-30 PROCEDURE — 99214 OFFICE O/P EST MOD 30 MIN: CPT | Performed by: ORTHOPAEDIC SURGERY

## 2024-07-30 PROCEDURE — 20550 NJX 1 TENDON SHEATH/LIGAMENT: CPT | Performed by: ORTHOPAEDIC SURGERY

## 2024-07-30 RX ORDER — SENNOSIDES A AND B 8.6 MG/1
1 TABLET, FILM COATED ORAL 2 TIMES DAILY
Qty: 30 TABLET | Refills: 2 | Status: SHIPPED | OUTPATIENT
Start: 2024-07-30

## 2024-07-30 RX ORDER — OXYCODONE HYDROCHLORIDE 5 MG/1
10 TABLET ORAL EVERY 4 HOURS PRN
Qty: 60 TABLET | Refills: 0 | Status: SHIPPED | OUTPATIENT
Start: 2024-07-30 | End: 2024-08-06

## 2024-07-30 RX ORDER — MELOXICAM 15 MG/1
15 TABLET ORAL DAILY
Qty: 30 TABLET | Refills: 2 | Status: SHIPPED | OUTPATIENT
Start: 2024-07-30

## 2024-07-30 RX ORDER — OMEPRAZOLE 40 MG/1
40 CAPSULE, DELAYED RELEASE ORAL DAILY
Qty: 30 CAPSULE | Refills: 0 | Status: SHIPPED | OUTPATIENT
Start: 2024-07-30

## 2024-07-30 RX ORDER — ONDANSETRON 4 MG/1
4 TABLET, FILM COATED ORAL 3 TIMES DAILY PRN
Qty: 30 TABLET | Refills: 1 | Status: SHIPPED | OUTPATIENT
Start: 2024-07-30

## 2024-07-30 RX ORDER — BETAMETHASONE SODIUM PHOSPHATE AND BETAMETHASONE ACETATE 3; 3 MG/ML; MG/ML
6 INJECTION, SUSPENSION INTRA-ARTICULAR; INTRALESIONAL; INTRAMUSCULAR; SOFT TISSUE ONCE
Status: COMPLETED | OUTPATIENT
Start: 2024-07-30 | End: 2024-07-30

## 2024-07-30 RX ORDER — SODIUM CHLORIDE 9 MG/ML
INJECTION, SOLUTION INTRAVENOUS PRN
Status: CANCELLED | OUTPATIENT
Start: 2024-07-30

## 2024-07-30 RX ORDER — HYDROXYZINE HYDROCHLORIDE 25 MG/1
25 TABLET, FILM COATED ORAL 2 TIMES DAILY
COMMUNITY

## 2024-07-30 RX ORDER — SODIUM CHLORIDE 0.9 % (FLUSH) 0.9 %
5-40 SYRINGE (ML) INJECTION EVERY 12 HOURS SCHEDULED
Status: CANCELLED | OUTPATIENT
Start: 2024-07-30

## 2024-07-30 RX ORDER — VENLAFAXINE HYDROCHLORIDE 75 MG/1
75 CAPSULE, EXTENDED RELEASE ORAL DAILY
COMMUNITY
Start: 2024-06-25

## 2024-07-30 RX ORDER — ASPIRIN 81 MG/1
81 TABLET ORAL 2 TIMES DAILY
Qty: 60 TABLET | Refills: 0 | Status: SHIPPED | OUTPATIENT
Start: 2024-07-30

## 2024-07-30 RX ORDER — CYCLOBENZAPRINE HCL 5 MG
5 TABLET ORAL 3 TIMES DAILY PRN
Qty: 30 TABLET | Refills: 0 | Status: SHIPPED | OUTPATIENT
Start: 2024-07-30 | End: 2024-08-09

## 2024-07-30 RX ORDER — GABAPENTIN 300 MG/1
300 CAPSULE ORAL 2 TIMES DAILY
Qty: 30 CAPSULE | Refills: 0 | Status: SHIPPED | OUTPATIENT
Start: 2024-07-30 | End: 2024-08-14

## 2024-07-30 RX ORDER — ACETAMINOPHEN 500 MG
1000 TABLET ORAL EVERY 6 HOURS PRN
Qty: 180 TABLET | Refills: 2 | Status: SHIPPED | OUTPATIENT
Start: 2024-07-30

## 2024-07-30 RX ORDER — SODIUM CHLORIDE 0.9 % (FLUSH) 0.9 %
5-40 SYRINGE (ML) INJECTION PRN
Status: CANCELLED | OUTPATIENT
Start: 2024-07-30

## 2024-07-30 RX ADMIN — BETAMETHASONE SODIUM PHOSPHATE AND BETAMETHASONE ACETATE 6 MG: 3; 3 INJECTION, SUSPENSION INTRA-ARTICULAR; INTRALESIONAL; INTRAMUSCULAR; SOFT TISSUE at 09:47

## 2024-07-30 NOTE — TELEPHONE ENCOUNTER
They received a referral but need her most recent office notes as well. Please fax to [phone].  This was left on voicemail.

## 2024-07-30 NOTE — PERIOP NOTE
Patient verified name and .  Order for consent found in EHR and does not match case posting; patient verifies procedure.     Consent order states \"Scar tissue excision right knee,\" and case posting states \"RIGHT KNEE TOTAL ARTHROPLASTY REVISION, Jovanna, Scar Tissue Excision.\"    Case message sent to surgery scheduling and Lulu Nguyễn informing of the above.     Type 3 surgery, phone assessment complete.  Orders received.  Labs per surgeon: CBC, CMP, PT/INR, A1C, MRSA, and EKG; orders in EHR---all to be completed DOS (tomorrow). Patient was an add-on. Labs needed noted in \"PAT Pre-Op Huddle\"  Labs per anesthesia protocol: All required lab work included in surgeons orders.     Cardiology office note (24) and echo (23) located in EHR if needed.     Patient answered medical/surgical history questions at their best of ability. All prior to admission medications documented in EPIC.    Patient instructed to continue taking all prescription medications up to the day of surgery but to take only the following medications the day of surgery according to anesthesia guidelines with a small sip of water: Hydroxyzine, Breo, Albuterol PRN, Gabapentin, Venlafaxine, Metoprolol.    Patient instructed to bring inhalers to the hospital on the DOS.     Patient informed that all vitamins, supplements, herbals, and NSAIDs should be held starting now.      Patient instructed on the following:    > Arrive at [facility] A Entrance, time of arrival to be called the day before by 1700  > NPO after midnight, unless otherwise indicated, including gum, mints, and ice chips  > Responsible adult must drive patient to the hospital, stay during surgery, and patient will need supervision 24 hours after anesthesia  > Use hibiclens/Dynahex in shower the night before surgery and on the morning of surgery  > All piercings must be removed prior to arrival.    > Leave all valuables (money and jewelry) at home but bring insurance card and ID on DOS.

## 2024-07-31 ENCOUNTER — ANESTHESIA (OUTPATIENT)
Dept: SURGERY | Age: 44
End: 2024-07-31
Payer: COMMERCIAL

## 2024-07-31 ENCOUNTER — HOSPITAL ENCOUNTER (OUTPATIENT)
Age: 44
Discharge: HOME HEALTH | End: 2024-08-01
Attending: ORTHOPAEDIC SURGERY | Admitting: ORTHOPAEDIC SURGERY
Payer: COMMERCIAL

## 2024-07-31 PROBLEM — Z96.651 PAIN DUE TO TOTAL RIGHT KNEE REPLACEMENT, SUBSEQUENT ENCOUNTER: Status: ACTIVE | Noted: 2024-07-31

## 2024-07-31 PROBLEM — Z96.651 PAIN DUE TO TOTAL RIGHT KNEE REPLACEMENT, INITIAL ENCOUNTER (HCC): Status: ACTIVE | Noted: 2024-07-31

## 2024-07-31 PROBLEM — T84.84XD PAIN DUE TO TOTAL RIGHT KNEE REPLACEMENT, SUBSEQUENT ENCOUNTER: Status: ACTIVE | Noted: 2024-07-31

## 2024-07-31 PROBLEM — T84.84XA PAIN DUE TO TOTAL RIGHT KNEE REPLACEMENT, INITIAL ENCOUNTER (HCC): Status: ACTIVE | Noted: 2024-07-31

## 2024-07-31 LAB
ABO + RH BLD: NORMAL
ALBUMIN SERPL-MCNC: 4.4 G/DL (ref 3.5–5)
ALBUMIN/GLOB SERPL: 1.5 (ref 1–1.9)
ALP SERPL-CCNC: 86 U/L (ref 35–104)
ALT SERPL-CCNC: 15 U/L (ref 12–65)
ANION GAP SERPL CALC-SCNC: 15 MMOL/L (ref 9–18)
AST SERPL-CCNC: 14 U/L (ref 15–37)
BILIRUB SERPL-MCNC: 0.3 MG/DL (ref 0–1.2)
BLOOD GROUP ANTIBODIES SERPL: NORMAL
BUN SERPL-MCNC: 9 MG/DL (ref 6–23)
CALCIUM SERPL-MCNC: 10.8 MG/DL (ref 8.8–10.2)
CHLORIDE SERPL-SCNC: 103 MMOL/L (ref 98–107)
CO2 SERPL-SCNC: 22 MMOL/L (ref 20–28)
CREAT SERPL-MCNC: 0.63 MG/DL (ref 0.6–1.1)
ERYTHROCYTE [DISTWIDTH] IN BLOOD BY AUTOMATED COUNT: 12.4 % (ref 11.9–14.6)
EST. AVERAGE GLUCOSE BLD GHB EST-MCNC: 115 MG/DL
GLOBULIN SER CALC-MCNC: 3 G/DL (ref 2.3–3.5)
GLUCOSE SERPL-MCNC: 96 MG/DL (ref 70–99)
HBA1C MFR BLD: 5.7 % (ref 0–5.6)
HCT VFR BLD AUTO: 40 % (ref 35.8–46.3)
HGB BLD-MCNC: 13.6 G/DL (ref 11.7–15.4)
INR PPP: 1.1
MCH RBC QN AUTO: 29.5 PG (ref 26.1–32.9)
MCHC RBC AUTO-ENTMCNC: 34 G/DL (ref 31.4–35)
MCV RBC AUTO: 86.8 FL (ref 82–102)
MRSA DNA SPEC QL NAA+PROBE: NOT DETECTED
NRBC # BLD: 0 K/UL (ref 0–0.2)
PLATELET # BLD AUTO: 349 K/UL (ref 150–450)
PMV BLD AUTO: 8.6 FL (ref 9.4–12.3)
POTASSIUM SERPL-SCNC: 4 MMOL/L (ref 3.5–5.1)
PROT SERPL-MCNC: 7.4 G/DL (ref 6.3–8.2)
PROTHROMBIN TIME: 13.7 SEC (ref 11.3–14.9)
RBC # BLD AUTO: 4.61 M/UL (ref 4.05–5.2)
S AUREUS CPE NOSE QL NAA+PROBE: NOT DETECTED
SODIUM SERPL-SCNC: 140 MMOL/L (ref 136–145)
SPECIMEN EXP DATE BLD: NORMAL
WBC # BLD AUTO: 11.4 K/UL (ref 4.3–11.1)

## 2024-07-31 PROCEDURE — 7100000001 HC PACU RECOVERY - ADDTL 15 MIN: Performed by: ORTHOPAEDIC SURGERY

## 2024-07-31 PROCEDURE — 6370000000 HC RX 637 (ALT 250 FOR IP): Performed by: ANESTHESIOLOGY

## 2024-07-31 PROCEDURE — 6360000002 HC RX W HCPCS: Performed by: ORTHOPAEDIC SURGERY

## 2024-07-31 PROCEDURE — 6360000002 HC RX W HCPCS: Performed by: NURSE ANESTHETIST, CERTIFIED REGISTERED

## 2024-07-31 PROCEDURE — 94761 N-INVAS EAR/PLS OXIMETRY MLT: CPT

## 2024-07-31 PROCEDURE — 86900 BLOOD TYPING SEROLOGIC ABO: CPT

## 2024-07-31 PROCEDURE — 2720000010 HC SURG SUPPLY STERILE: Performed by: ORTHOPAEDIC SURGERY

## 2024-07-31 PROCEDURE — 6370000000 HC RX 637 (ALT 250 FOR IP): Performed by: ORTHOPAEDIC SURGERY

## 2024-07-31 PROCEDURE — 94760 N-INVAS EAR/PLS OXIMETRY 1: CPT

## 2024-07-31 PROCEDURE — 6360000002 HC RX W HCPCS: Performed by: NURSE PRACTITIONER

## 2024-07-31 PROCEDURE — 94640 AIRWAY INHALATION TREATMENT: CPT

## 2024-07-31 PROCEDURE — 36415 COLL VENOUS BLD VENIPUNCTURE: CPT

## 2024-07-31 PROCEDURE — 86850 RBC ANTIBODY SCREEN: CPT

## 2024-07-31 PROCEDURE — 86901 BLOOD TYPING SEROLOGIC RH(D): CPT

## 2024-07-31 PROCEDURE — 6360000002 HC RX W HCPCS: Performed by: ANESTHESIOLOGY

## 2024-07-31 PROCEDURE — 2500000003 HC RX 250 WO HCPCS: Performed by: NURSE ANESTHETIST, CERTIFIED REGISTERED

## 2024-07-31 PROCEDURE — 2709999900 HC NON-CHARGEABLE SUPPLY: Performed by: ORTHOPAEDIC SURGERY

## 2024-07-31 PROCEDURE — 2580000003 HC RX 258: Performed by: ORTHOPAEDIC SURGERY

## 2024-07-31 PROCEDURE — 80053 COMPREHEN METABOLIC PANEL: CPT

## 2024-07-31 PROCEDURE — 2580000003 HC RX 258: Performed by: ANESTHESIOLOGY

## 2024-07-31 PROCEDURE — 2580000003 HC RX 258: Performed by: NURSE PRACTITIONER

## 2024-07-31 PROCEDURE — 3700000001 HC ADD 15 MINUTES (ANESTHESIA): Performed by: ORTHOPAEDIC SURGERY

## 2024-07-31 PROCEDURE — 3600000015 HC SURGERY LEVEL 5 ADDTL 15MIN: Performed by: ORTHOPAEDIC SURGERY

## 2024-07-31 PROCEDURE — 85027 COMPLETE CBC AUTOMATED: CPT

## 2024-07-31 PROCEDURE — 3700000000 HC ANESTHESIA ATTENDED CARE: Performed by: ORTHOPAEDIC SURGERY

## 2024-07-31 PROCEDURE — 3600000005 HC SURGERY LEVEL 5 BASE: Performed by: ORTHOPAEDIC SURGERY

## 2024-07-31 PROCEDURE — 7100000000 HC PACU RECOVERY - FIRST 15 MIN: Performed by: ORTHOPAEDIC SURGERY

## 2024-07-31 PROCEDURE — 6370000000 HC RX 637 (ALT 250 FOR IP): Performed by: NURSE PRACTITIONER

## 2024-07-31 PROCEDURE — 64447 NJX AA&/STRD FEMORAL NRV IMG: CPT | Performed by: ANESTHESIOLOGY

## 2024-07-31 PROCEDURE — 85610 PROTHROMBIN TIME: CPT

## 2024-07-31 PROCEDURE — 83036 HEMOGLOBIN GLYCOSYLATED A1C: CPT

## 2024-07-31 PROCEDURE — 87641 MR-STAPH DNA AMP PROBE: CPT

## 2024-07-31 RX ORDER — MONTELUKAST SODIUM 10 MG/1
10 TABLET ORAL NIGHTLY
Status: DISCONTINUED | OUTPATIENT
Start: 2024-07-31 | End: 2024-08-01 | Stop reason: HOSPADM

## 2024-07-31 RX ORDER — EPHEDRINE SULFATE/0.9% NACL/PF 50 MG/5 ML
SYRINGE (ML) INTRAVENOUS PRN
Status: DISCONTINUED | OUTPATIENT
Start: 2024-07-31 | End: 2024-07-31 | Stop reason: SDUPTHER

## 2024-07-31 RX ORDER — KETOROLAC TROMETHAMINE 15 MG/ML
15 INJECTION, SOLUTION INTRAMUSCULAR; INTRAVENOUS EVERY 8 HOURS
Status: DISCONTINUED | OUTPATIENT
Start: 2024-07-31 | End: 2024-08-01 | Stop reason: HOSPADM

## 2024-07-31 RX ORDER — GABAPENTIN 300 MG/1
900 CAPSULE ORAL NIGHTLY
Status: DISCONTINUED | OUTPATIENT
Start: 2024-07-31 | End: 2024-08-01 | Stop reason: HOSPADM

## 2024-07-31 RX ORDER — FLUTICASONE FUROATE AND VILANTEROL 100; 25 UG/1; UG/1
1 POWDER RESPIRATORY (INHALATION) 2 TIMES DAILY
Status: DISCONTINUED | OUTPATIENT
Start: 2024-07-31 | End: 2024-07-31 | Stop reason: CLARIF

## 2024-07-31 RX ORDER — METOPROLOL SUCCINATE 100 MG/1
100 TABLET, EXTENDED RELEASE ORAL DAILY
Status: DISCONTINUED | OUTPATIENT
Start: 2024-08-01 | End: 2024-08-01 | Stop reason: HOSPADM

## 2024-07-31 RX ORDER — MIDAZOLAM HYDROCHLORIDE 2 MG/2ML
4 INJECTION, SOLUTION INTRAMUSCULAR; INTRAVENOUS
Status: COMPLETED | OUTPATIENT
Start: 2024-07-31 | End: 2024-07-31

## 2024-07-31 RX ORDER — SODIUM CHLORIDE 0.9 % (FLUSH) 0.9 %
5-40 SYRINGE (ML) INJECTION PRN
Status: DISCONTINUED | OUTPATIENT
Start: 2024-07-31 | End: 2024-08-01 | Stop reason: HOSPADM

## 2024-07-31 RX ORDER — DIPHENHYDRAMINE HYDROCHLORIDE 50 MG/ML
25 INJECTION INTRAMUSCULAR; INTRAVENOUS EVERY 6 HOURS PRN
Status: DISCONTINUED | OUTPATIENT
Start: 2024-07-31 | End: 2024-08-01 | Stop reason: HOSPADM

## 2024-07-31 RX ORDER — OXYCODONE HYDROCHLORIDE 5 MG/1
5 TABLET ORAL
Status: COMPLETED | OUTPATIENT
Start: 2024-07-31 | End: 2024-07-31

## 2024-07-31 RX ORDER — NEOSTIGMINE METHYLSULFATE 1 MG/ML
INJECTION, SOLUTION INTRAVENOUS PRN
Status: DISCONTINUED | OUTPATIENT
Start: 2024-07-31 | End: 2024-07-31 | Stop reason: SDUPTHER

## 2024-07-31 RX ORDER — SODIUM CHLORIDE 0.9 % (FLUSH) 0.9 %
5-40 SYRINGE (ML) INJECTION EVERY 12 HOURS SCHEDULED
Status: DISCONTINUED | OUTPATIENT
Start: 2024-07-31 | End: 2024-07-31 | Stop reason: HOSPADM

## 2024-07-31 RX ORDER — AMITRIPTYLINE HYDROCHLORIDE 10 MG/1
20 TABLET, FILM COATED ORAL NIGHTLY
Status: DISCONTINUED | OUTPATIENT
Start: 2024-07-31 | End: 2024-08-01 | Stop reason: HOSPADM

## 2024-07-31 RX ORDER — ROCURONIUM BROMIDE 10 MG/ML
INJECTION, SOLUTION INTRAVENOUS PRN
Status: DISCONTINUED | OUTPATIENT
Start: 2024-07-31 | End: 2024-07-31 | Stop reason: SDUPTHER

## 2024-07-31 RX ORDER — HYDROXYZINE HYDROCHLORIDE 25 MG/1
50 TABLET, FILM COATED ORAL
Status: DISCONTINUED | OUTPATIENT
Start: 2024-07-31 | End: 2024-08-01 | Stop reason: HOSPADM

## 2024-07-31 RX ORDER — MELOXICAM 7.5 MG/1
7.5 TABLET ORAL 2 TIMES DAILY
Status: DISCONTINUED | OUTPATIENT
Start: 2024-08-03 | End: 2024-08-01 | Stop reason: HOSPADM

## 2024-07-31 RX ORDER — LIDOCAINE HYDROCHLORIDE 20 MG/ML
INJECTION, SOLUTION EPIDURAL; INFILTRATION; INTRACAUDAL; PERINEURAL PRN
Status: DISCONTINUED | OUTPATIENT
Start: 2024-07-31 | End: 2024-07-31 | Stop reason: SDUPTHER

## 2024-07-31 RX ORDER — ONDANSETRON 2 MG/ML
4 INJECTION INTRAMUSCULAR; INTRAVENOUS
Status: DISCONTINUED | OUTPATIENT
Start: 2024-07-31 | End: 2024-07-31 | Stop reason: HOSPADM

## 2024-07-31 RX ORDER — TOBRAMYCIN 1.2 G/30ML
INJECTION, POWDER, LYOPHILIZED, FOR SOLUTION INTRAVENOUS PRN
Status: DISCONTINUED | OUTPATIENT
Start: 2024-07-31 | End: 2024-07-31 | Stop reason: HOSPADM

## 2024-07-31 RX ORDER — ALBUTEROL SULFATE 2.5 MG/3ML
2.5 SOLUTION RESPIRATORY (INHALATION) EVERY 6 HOURS PRN
Status: DISCONTINUED | OUTPATIENT
Start: 2024-07-31 | End: 2024-08-01 | Stop reason: HOSPADM

## 2024-07-31 RX ORDER — OXYCODONE HYDROCHLORIDE 5 MG/1
5 TABLET ORAL EVERY 4 HOURS PRN
Status: DISCONTINUED | OUTPATIENT
Start: 2024-07-31 | End: 2024-08-01 | Stop reason: HOSPADM

## 2024-07-31 RX ORDER — SODIUM CHLORIDE, SODIUM LACTATE, POTASSIUM CHLORIDE, CALCIUM CHLORIDE 600; 310; 30; 20 MG/100ML; MG/100ML; MG/100ML; MG/100ML
INJECTION, SOLUTION INTRAVENOUS CONTINUOUS
Status: DISCONTINUED | OUTPATIENT
Start: 2024-07-31 | End: 2024-07-31 | Stop reason: HOSPADM

## 2024-07-31 RX ORDER — ACETAMINOPHEN 500 MG
1000 TABLET ORAL EVERY 6 HOURS
Status: DISCONTINUED | OUTPATIENT
Start: 2024-07-31 | End: 2024-08-01 | Stop reason: HOSPADM

## 2024-07-31 RX ORDER — ONDANSETRON 2 MG/ML
4 INJECTION INTRAMUSCULAR; INTRAVENOUS EVERY 6 HOURS PRN
Status: DISCONTINUED | OUTPATIENT
Start: 2024-07-31 | End: 2024-08-01 | Stop reason: HOSPADM

## 2024-07-31 RX ORDER — NALOXONE HYDROCHLORIDE 0.4 MG/ML
0.4 INJECTION, SOLUTION INTRAMUSCULAR; INTRAVENOUS; SUBCUTANEOUS PRN
Status: DISCONTINUED | OUTPATIENT
Start: 2024-07-31 | End: 2024-08-01 | Stop reason: HOSPADM

## 2024-07-31 RX ORDER — GLYCOPYRROLATE 0.2 MG/ML
INJECTION INTRAMUSCULAR; INTRAVENOUS PRN
Status: DISCONTINUED | OUTPATIENT
Start: 2024-07-31 | End: 2024-07-31 | Stop reason: SDUPTHER

## 2024-07-31 RX ORDER — METOPROLOL SUCCINATE 25 MG/1
50 TABLET, EXTENDED RELEASE ORAL
Status: COMPLETED | OUTPATIENT
Start: 2024-07-31 | End: 2024-07-31

## 2024-07-31 RX ORDER — FENTANYL CITRATE 50 UG/ML
INJECTION, SOLUTION INTRAMUSCULAR; INTRAVENOUS PRN
Status: DISCONTINUED | OUTPATIENT
Start: 2024-07-31 | End: 2024-07-31 | Stop reason: SDUPTHER

## 2024-07-31 RX ORDER — TRANEXAMIC ACID 650 MG/1
1300 TABLET ORAL
Status: COMPLETED | OUTPATIENT
Start: 2024-07-31 | End: 2024-07-31

## 2024-07-31 RX ORDER — CYCLOBENZAPRINE HCL 5 MG
5 TABLET ORAL 3 TIMES DAILY PRN
Status: DISCONTINUED | OUTPATIENT
Start: 2024-07-31 | End: 2024-08-01 | Stop reason: HOSPADM

## 2024-07-31 RX ORDER — KETOROLAC TROMETHAMINE 30 MG/ML
INJECTION, SOLUTION INTRAMUSCULAR; INTRAVENOUS PRN
Status: DISCONTINUED | OUTPATIENT
Start: 2024-07-31 | End: 2024-07-31 | Stop reason: HOSPADM

## 2024-07-31 RX ORDER — GABAPENTIN 300 MG/1
600 CAPSULE ORAL
Status: DISCONTINUED | OUTPATIENT
Start: 2024-08-01 | End: 2024-08-01 | Stop reason: HOSPADM

## 2024-07-31 RX ORDER — DEXAMETHASONE SODIUM PHOSPHATE 10 MG/ML
INJECTION INTRAMUSCULAR; INTRAVENOUS PRN
Status: DISCONTINUED | OUTPATIENT
Start: 2024-07-31 | End: 2024-07-31 | Stop reason: SDUPTHER

## 2024-07-31 RX ORDER — FENTANYL CITRATE 50 UG/ML
100 INJECTION, SOLUTION INTRAMUSCULAR; INTRAVENOUS
Status: COMPLETED | OUTPATIENT
Start: 2024-07-31 | End: 2024-07-31

## 2024-07-31 RX ORDER — ONDANSETRON 4 MG/1
8 TABLET, ORALLY DISINTEGRATING ORAL EVERY 8 HOURS PRN
Status: DISCONTINUED | OUTPATIENT
Start: 2024-07-31 | End: 2024-07-31 | Stop reason: SDUPTHER

## 2024-07-31 RX ORDER — HYDROMORPHONE HYDROCHLORIDE 1 MG/ML
1 INJECTION, SOLUTION INTRAMUSCULAR; INTRAVENOUS; SUBCUTANEOUS
Status: DISCONTINUED | OUTPATIENT
Start: 2024-07-31 | End: 2024-08-01 | Stop reason: HOSPADM

## 2024-07-31 RX ORDER — SODIUM CHLORIDE 0.9 % (FLUSH) 0.9 %
5-40 SYRINGE (ML) INJECTION PRN
Status: DISCONTINUED | OUTPATIENT
Start: 2024-07-31 | End: 2024-07-31 | Stop reason: HOSPADM

## 2024-07-31 RX ORDER — DICLOFENAC SODIUM 75 MG/1
75 TABLET, DELAYED RELEASE ORAL 2 TIMES DAILY
Qty: 120 TABLET | Refills: 0 | Status: SHIPPED | OUTPATIENT
Start: 2024-07-31 | End: 2024-09-29

## 2024-07-31 RX ORDER — VANCOMYCIN HYDROCHLORIDE 1 G/20ML
INJECTION, POWDER, LYOPHILIZED, FOR SOLUTION INTRAVENOUS PRN
Status: DISCONTINUED | OUTPATIENT
Start: 2024-07-31 | End: 2024-07-31 | Stop reason: HOSPADM

## 2024-07-31 RX ORDER — LIDOCAINE HYDROCHLORIDE 10 MG/ML
1 INJECTION, SOLUTION INFILTRATION; PERINEURAL
Status: DISCONTINUED | OUTPATIENT
Start: 2024-07-31 | End: 2024-07-31 | Stop reason: HOSPADM

## 2024-07-31 RX ORDER — TRANEXAMIC ACID 100 MG/ML
INJECTION, SOLUTION INTRAVENOUS PRN
Status: DISCONTINUED | OUTPATIENT
Start: 2024-07-31 | End: 2024-07-31 | Stop reason: SDUPTHER

## 2024-07-31 RX ORDER — NALOXONE HYDROCHLORIDE 0.4 MG/ML
INJECTION, SOLUTION INTRAMUSCULAR; INTRAVENOUS; SUBCUTANEOUS PRN
Status: DISCONTINUED | OUTPATIENT
Start: 2024-07-31 | End: 2024-07-31 | Stop reason: HOSPADM

## 2024-07-31 RX ORDER — DEXAMETHASONE SODIUM PHOSPHATE 10 MG/ML
10 INJECTION INTRAMUSCULAR; INTRAVENOUS ONCE
Status: DISCONTINUED | OUTPATIENT
Start: 2024-08-01 | End: 2024-08-01 | Stop reason: HOSPADM

## 2024-07-31 RX ORDER — SUMATRIPTAN 50 MG/1
50 TABLET, FILM COATED ORAL
Status: DISCONTINUED | OUTPATIENT
Start: 2024-07-31 | End: 2024-08-01 | Stop reason: HOSPADM

## 2024-07-31 RX ORDER — DEXAMETHASONE SODIUM PHOSPHATE 4 MG/ML
INJECTION, SOLUTION INTRA-ARTICULAR; INTRALESIONAL; INTRAMUSCULAR; INTRAVENOUS; SOFT TISSUE
Status: COMPLETED | OUTPATIENT
Start: 2024-07-31 | End: 2024-07-31

## 2024-07-31 RX ORDER — OXYCODONE HYDROCHLORIDE 5 MG/1
10 TABLET ORAL EVERY 4 HOURS PRN
Status: DISCONTINUED | OUTPATIENT
Start: 2024-07-31 | End: 2024-08-01 | Stop reason: HOSPADM

## 2024-07-31 RX ORDER — PANTOPRAZOLE SODIUM 40 MG/1
40 TABLET, DELAYED RELEASE ORAL
Status: DISCONTINUED | OUTPATIENT
Start: 2024-08-01 | End: 2024-08-01 | Stop reason: HOSPADM

## 2024-07-31 RX ORDER — HYDROXYZINE HYDROCHLORIDE 25 MG/1
25 TABLET, FILM COATED ORAL DAILY
Status: DISCONTINUED | OUTPATIENT
Start: 2024-08-01 | End: 2024-08-01 | Stop reason: HOSPADM

## 2024-07-31 RX ORDER — ONDANSETRON 2 MG/ML
INJECTION INTRAMUSCULAR; INTRAVENOUS PRN
Status: DISCONTINUED | OUTPATIENT
Start: 2024-07-31 | End: 2024-07-31 | Stop reason: SDUPTHER

## 2024-07-31 RX ORDER — BUDESONIDE 0.25 MG/2ML
0.25 INHALANT ORAL
Status: DISCONTINUED | OUTPATIENT
Start: 2024-07-31 | End: 2024-08-01 | Stop reason: HOSPADM

## 2024-07-31 RX ORDER — GABAPENTIN 300 MG/1
300 CAPSULE ORAL 2 TIMES DAILY
Status: DISCONTINUED | OUTPATIENT
Start: 2024-07-31 | End: 2024-07-31

## 2024-07-31 RX ORDER — SODIUM CHLORIDE 9 MG/ML
INJECTION, SOLUTION INTRAVENOUS CONTINUOUS
Status: DISCONTINUED | OUTPATIENT
Start: 2024-07-31 | End: 2024-08-01 | Stop reason: HOSPADM

## 2024-07-31 RX ORDER — ROPIVACAINE HYDROCHLORIDE 2 MG/ML
INJECTION, SOLUTION EPIDURAL; INFILTRATION; PERINEURAL PRN
Status: DISCONTINUED | OUTPATIENT
Start: 2024-07-31 | End: 2024-07-31 | Stop reason: HOSPADM

## 2024-07-31 RX ORDER — SODIUM CHLORIDE 0.9 % (FLUSH) 0.9 %
5-40 SYRINGE (ML) INJECTION EVERY 12 HOURS SCHEDULED
Status: DISCONTINUED | OUTPATIENT
Start: 2024-07-31 | End: 2024-08-01 | Stop reason: HOSPADM

## 2024-07-31 RX ORDER — ARFORMOTEROL TARTRATE 15 UG/2ML
15 SOLUTION RESPIRATORY (INHALATION)
Status: DISCONTINUED | OUTPATIENT
Start: 2024-07-31 | End: 2024-08-01 | Stop reason: HOSPADM

## 2024-07-31 RX ORDER — ASPIRIN 81 MG/1
81 TABLET ORAL 2 TIMES DAILY
Status: DISCONTINUED | OUTPATIENT
Start: 2024-07-31 | End: 2024-08-01 | Stop reason: HOSPADM

## 2024-07-31 RX ORDER — VENLAFAXINE HYDROCHLORIDE 75 MG/1
75 CAPSULE, EXTENDED RELEASE ORAL DAILY
Status: DISCONTINUED | OUTPATIENT
Start: 2024-08-01 | End: 2024-08-01 | Stop reason: HOSPADM

## 2024-07-31 RX ORDER — HYDROXYZINE HYDROCHLORIDE 25 MG/1
25 TABLET, FILM COATED ORAL 2 TIMES DAILY
Status: DISCONTINUED | OUTPATIENT
Start: 2024-07-31 | End: 2024-07-31 | Stop reason: SDUPTHER

## 2024-07-31 RX ORDER — HYDROMORPHONE HYDROCHLORIDE 2 MG/ML
INJECTION, SOLUTION INTRAMUSCULAR; INTRAVENOUS; SUBCUTANEOUS PRN
Status: DISCONTINUED | OUTPATIENT
Start: 2024-07-31 | End: 2024-07-31 | Stop reason: SDUPTHER

## 2024-07-31 RX ORDER — ONDANSETRON 4 MG/1
4 TABLET, ORALLY DISINTEGRATING ORAL EVERY 8 HOURS PRN
Status: DISCONTINUED | OUTPATIENT
Start: 2024-07-31 | End: 2024-08-01 | Stop reason: HOSPADM

## 2024-07-31 RX ORDER — PROPOFOL 10 MG/ML
INJECTION, EMULSION INTRAVENOUS PRN
Status: DISCONTINUED | OUTPATIENT
Start: 2024-07-31 | End: 2024-07-31 | Stop reason: SDUPTHER

## 2024-07-31 RX ORDER — SENNA AND DOCUSATE SODIUM 50; 8.6 MG/1; MG/1
1 TABLET, FILM COATED ORAL 2 TIMES DAILY
Status: DISCONTINUED | OUTPATIENT
Start: 2024-07-31 | End: 2024-08-01 | Stop reason: HOSPADM

## 2024-07-31 RX ORDER — SODIUM CHLORIDE 9 MG/ML
INJECTION, SOLUTION INTRAVENOUS PRN
Status: DISCONTINUED | OUTPATIENT
Start: 2024-07-31 | End: 2024-07-31 | Stop reason: HOSPADM

## 2024-07-31 RX ORDER — SODIUM CHLORIDE 9 MG/ML
INJECTION, SOLUTION INTRAVENOUS PRN
Status: DISCONTINUED | OUTPATIENT
Start: 2024-07-31 | End: 2024-08-01 | Stop reason: HOSPADM

## 2024-07-31 RX ORDER — DIPHENHYDRAMINE HCL 25 MG
25 CAPSULE ORAL EVERY 6 HOURS PRN
Status: DISCONTINUED | OUTPATIENT
Start: 2024-07-31 | End: 2024-08-01 | Stop reason: HOSPADM

## 2024-07-31 RX ADMIN — ROPIVACAINE HYDROCHLORIDE 20 ML: 2 INJECTION, SOLUTION EPIDURAL; INFILTRATION at 12:01

## 2024-07-31 RX ADMIN — HYDROMORPHONE HYDROCHLORIDE 0.5 MG: 2 INJECTION INTRAMUSCULAR; INTRAVENOUS; SUBCUTANEOUS at 14:29

## 2024-07-31 RX ADMIN — SODIUM CHLORIDE, SODIUM LACTATE, POTASSIUM CHLORIDE, AND CALCIUM CHLORIDE: 600; 310; 30; 20 INJECTION, SOLUTION INTRAVENOUS at 11:23

## 2024-07-31 RX ADMIN — ACETAMINOPHEN 1000 MG: 500 TABLET, FILM COATED ORAL at 23:59

## 2024-07-31 RX ADMIN — MIDAZOLAM 3 MG: 1 INJECTION INTRAMUSCULAR; INTRAVENOUS at 12:01

## 2024-07-31 RX ADMIN — SODIUM CHLORIDE, PRESERVATIVE FREE 10 ML: 5 INJECTION INTRAVENOUS at 19:44

## 2024-07-31 RX ADMIN — ACETAMINOPHEN 1000 MG: 500 TABLET, FILM COATED ORAL at 17:07

## 2024-07-31 RX ADMIN — PROPOFOL 200 MG: 10 INJECTION, EMULSION INTRAVENOUS at 13:57

## 2024-07-31 RX ADMIN — VANCOMYCIN HYDROCHLORIDE 1000 MG: 1 INJECTION, POWDER, LYOPHILIZED, FOR SOLUTION INTRAVENOUS at 11:42

## 2024-07-31 RX ADMIN — LIDOCAINE HYDROCHLORIDE 100 MG: 20 INJECTION, SOLUTION EPIDURAL; INFILTRATION; INTRACAUDAL; PERINEURAL at 13:57

## 2024-07-31 RX ADMIN — HYDROMORPHONE HYDROCHLORIDE 0.5 MG: 1 INJECTION, SOLUTION INTRAMUSCULAR; INTRAVENOUS; SUBCUTANEOUS at 15:43

## 2024-07-31 RX ADMIN — DEXAMETHASONE SODIUM PHOSPHATE 10 MG: 10 INJECTION INTRAMUSCULAR; INTRAVENOUS at 14:18

## 2024-07-31 RX ADMIN — HYDROMORPHONE HYDROCHLORIDE 0.5 MG: 1 INJECTION, SOLUTION INTRAMUSCULAR; INTRAVENOUS; SUBCUTANEOUS at 15:20

## 2024-07-31 RX ADMIN — ROCURONIUM BROMIDE 50 MG: 10 INJECTION, SOLUTION INTRAVENOUS at 13:57

## 2024-07-31 RX ADMIN — CEFAZOLIN 2000 MG: 10 INJECTION, POWDER, FOR SOLUTION INTRAVENOUS at 21:41

## 2024-07-31 RX ADMIN — ASPIRIN 81 MG: 81 TABLET, COATED ORAL at 19:43

## 2024-07-31 RX ADMIN — DEXAMETHASONE SODIUM PHOSPHATE 4 MG: 4 INJECTION INTRA-ARTICULAR; INTRALESIONAL; INTRAMUSCULAR; INTRAVENOUS; SOFT TISSUE at 12:01

## 2024-07-31 RX ADMIN — KETOROLAC TROMETHAMINE 15 MG: 15 INJECTION, SOLUTION INTRAMUSCULAR; INTRAVENOUS at 17:07

## 2024-07-31 RX ADMIN — HYDROXYZINE HYDROCHLORIDE 50 MG: 25 TABLET, FILM COATED ORAL at 21:41

## 2024-07-31 RX ADMIN — ONDANSETRON 4 MG: 2 INJECTION INTRAMUSCULAR; INTRAVENOUS at 17:08

## 2024-07-31 RX ADMIN — TRANEXAMIC ACID 1000 MG: 100 INJECTION, SOLUTION INTRAVENOUS at 14:22

## 2024-07-31 RX ADMIN — MONTELUKAST 10 MG: 10 TABLET, FILM COATED ORAL at 19:43

## 2024-07-31 RX ADMIN — KETOROLAC TROMETHAMINE 15 MG: 15 INJECTION, SOLUTION INTRAMUSCULAR; INTRAVENOUS at 23:59

## 2024-07-31 RX ADMIN — METOPROLOL SUCCINATE 50 MG: 25 TABLET, EXTENDED RELEASE ORAL at 12:13

## 2024-07-31 RX ADMIN — TRANEXAMIC ACID 1300 MG: 650 TABLET ORAL at 19:43

## 2024-07-31 RX ADMIN — SODIUM CHLORIDE: 9 INJECTION, SOLUTION INTRAVENOUS at 17:03

## 2024-07-31 RX ADMIN — ONDANSETRON 4 MG: 2 INJECTION INTRAMUSCULAR; INTRAVENOUS at 14:09

## 2024-07-31 RX ADMIN — OXYCODONE 10 MG: 5 TABLET ORAL at 23:59

## 2024-07-31 RX ADMIN — Medication 2000 MG: at 14:09

## 2024-07-31 RX ADMIN — TRANEXAMIC ACID 1300 MG: 650 TABLET ORAL at 17:07

## 2024-07-31 RX ADMIN — SENNOSIDES AND DOCUSATE SODIUM 1 TABLET: 50; 8.6 TABLET ORAL at 19:43

## 2024-07-31 RX ADMIN — SODIUM CHLORIDE, SODIUM LACTATE, POTASSIUM CHLORIDE, AND CALCIUM CHLORIDE: 600; 310; 30; 20 INJECTION, SOLUTION INTRAVENOUS at 14:51

## 2024-07-31 RX ADMIN — HYDROMORPHONE HYDROCHLORIDE 0.5 MG: 1 INJECTION, SOLUTION INTRAMUSCULAR; INTRAVENOUS; SUBCUTANEOUS at 15:30

## 2024-07-31 RX ADMIN — FENTANYL CITRATE 100 MCG: 50 INJECTION, SOLUTION INTRAMUSCULAR; INTRAVENOUS at 13:57

## 2024-07-31 RX ADMIN — GABAPENTIN 900 MG: 300 CAPSULE ORAL at 21:41

## 2024-07-31 RX ADMIN — OXYCODONE HYDROCHLORIDE 5 MG: 5 TABLET ORAL at 16:03

## 2024-07-31 RX ADMIN — ARFORMOTEROL TARTRATE 15 MCG: 15 SOLUTION RESPIRATORY (INHALATION) at 20:15

## 2024-07-31 RX ADMIN — FENTANYL CITRATE 50 MCG: 50 INJECTION INTRAMUSCULAR; INTRAVENOUS at 12:01

## 2024-07-31 RX ADMIN — BUDESONIDE 250 MCG: 0.25 SUSPENSION RESPIRATORY (INHALATION) at 20:15

## 2024-07-31 RX ADMIN — OXYCODONE 10 MG: 5 TABLET ORAL at 19:43

## 2024-07-31 RX ADMIN — GLYCOPYRROLATE 0.4 MG: 0.2 INJECTION INTRAMUSCULAR; INTRAVENOUS at 15:07

## 2024-07-31 RX ADMIN — Medication 3 MG: at 15:07

## 2024-07-31 RX ADMIN — AMITRIPTYLINE HYDROCHLORIDE 20 MG: 10 TABLET, FILM COATED ORAL at 19:43

## 2024-07-31 RX ADMIN — Medication 15 MG: at 14:36

## 2024-07-31 RX ADMIN — FOSAPREPITANT 150 MG: 150 INJECTION, POWDER, LYOPHILIZED, FOR SOLUTION INTRAVENOUS at 14:17

## 2024-07-31 ASSESSMENT — PAIN - FUNCTIONAL ASSESSMENT
PAIN_FUNCTIONAL_ASSESSMENT: ACTIVITIES ARE NOT PREVENTED
PAIN_FUNCTIONAL_ASSESSMENT: 0-10
PAIN_FUNCTIONAL_ASSESSMENT: ACTIVITIES ARE NOT PREVENTED

## 2024-07-31 ASSESSMENT — PAIN DESCRIPTION - LOCATION
LOCATION: KNEE

## 2024-07-31 ASSESSMENT — PAIN SCALES - GENERAL
PAINLEVEL_OUTOF10: 8
PAINLEVEL_OUTOF10: 6
PAINLEVEL_OUTOF10: 0
PAINLEVEL_OUTOF10: 7
PAINLEVEL_OUTOF10: 7
PAINLEVEL_OUTOF10: 5
PAINLEVEL_OUTOF10: 7
PAINLEVEL_OUTOF10: 7

## 2024-07-31 ASSESSMENT — PAIN DESCRIPTION - ORIENTATION
ORIENTATION: RIGHT;POSTERIOR
ORIENTATION: RIGHT
ORIENTATION: RIGHT;POSTERIOR
ORIENTATION: RIGHT;POSTERIOR
ORIENTATION: POSTERIOR;RIGHT

## 2024-07-31 ASSESSMENT — PAIN DESCRIPTION - DESCRIPTORS
DESCRIPTORS: PATIENT UNABLE TO DESCRIBE
DESCRIPTORS: ACHING;STABBING
DESCRIPTORS: ACHING
DESCRIPTORS: BURNING
DESCRIPTORS: PATIENT UNABLE TO DESCRIBE
DESCRIPTORS: PATIENT UNABLE TO DESCRIBE
DESCRIPTORS: ACHING
DESCRIPTORS: ACHING;SORE

## 2024-07-31 ASSESSMENT — PAIN DESCRIPTION - FREQUENCY
FREQUENCY: CONTINUOUS

## 2024-07-31 ASSESSMENT — PAIN DESCRIPTION - PAIN TYPE
TYPE: SURGICAL PAIN
TYPE: ACUTE PAIN;SURGICAL PAIN
TYPE: SURGICAL PAIN
TYPE: ACUTE PAIN;SURGICAL PAIN
TYPE: SURGICAL PAIN
TYPE: SURGICAL PAIN

## 2024-07-31 ASSESSMENT — PAIN DESCRIPTION - ONSET
ONSET: PROGRESSIVE
ONSET: PROGRESSIVE

## 2024-07-31 NOTE — PROGRESS NOTES
Orthopaedic Hand Surgery Note    Name: Jose Sainz  Age: 43 y.o.  YOB: 1980  Gender: female  MRN: [id number]    Post Operative Visit: Left CARPAL TUNNEL RELEASE - Left and FINGER TRIGGER RELEASE on the left index, ring and thumb - Left    HPI: Patient is status post Left CARPAL TUNNEL RELEASE - Left and FINGER TRIGGER RELEASE on the left index, ring and thumb - Left on 9/15/2022. Patient reports painful clicking and locking of the right middle finger, patient also continues to complain of stiffness in the fingers the last more than 1 hour in the mornings, I have previously worked her up for rheumatoid arthritis and all workup was negative.    Physical Examination:  Examination of the right hand demonstrates tenderness palpation of the right middle finger A1 pulley with palpable clicking but no locking.    Imaging:     none    Assessment:   1. Acquired trigger finger of right middle finger    2. Rheumatoid arthritis of hand, unspecified laterality, unspecified whether rheumatoid factor present (HCC)         Status post Left CARPAL TUNNEL RELEASE - Left and FINGER TRIGGER RELEASE on the left index, ring and thumb - Left on 9/15/2022    Plan:  We discussed the post operative course and progression.  Right middle finger A1 pulley steroid injection, diclofenac 75 twice daily, I will refer the patient for rheumatology assessment, I have performed laboratory workup in the past which was all negative however, she continues to complain of multiple aches and pains throughout her body as well as finger stiffness that last more than 1 hour in the mornings    Procedure Note    The risk, benefits and alternatives of injection and no injection therapy were discussed. The patient consented for an injection. The patient has been identified by name and birthdate. The injection site was identified, marked and prepped with a alcohol swab. Time out completed. The A1 pulley of the Right middle finger was/were

## 2024-07-31 NOTE — ANESTHESIA PROCEDURE NOTES
Peripheral Block    Patient location during procedure: procedure area  Reason for block: post-op pain management and at surgeon's request  Start time: 7/31/2024 12:01 PM  End time: 7/31/2024 12:04 PM  Staffing  Performed: anesthesiologist   Anesthesiologist: Mukund Montenegro Jr., MD  Performed by: Mukund Montenegro Jr., MD  Authorized by: Mukund Montenegro Jr., MD    Preanesthetic Checklist  Completed: patient identified, IV checked, site marked, risks and benefits discussed, surgical/procedural consents, equipment checked, pre-op evaluation, timeout performed, anesthesia consent given, oxygen available, monitors applied/VS acknowledged, fire risk safety assessment completed and verbalized and blood product R/B/A discussed and consented  Peripheral Block   Patient position: supine  Prep: ChloraPrep  Provider prep: mask and sterile gloves  Patient monitoring: cardiac monitor, continuous pulse ox, frequent blood pressure checks, IV access, oxygen and responsive to questions  Block type: Femoral  Adductor canal  Laterality: right  Injection technique: single-shot  Guidance: ultrasound guided    Needle   Needle type: insulated echogenic nerve stimulator needle   Needle gauge: 20 G  Needle localization: ultrasound guidance  Needle insertion depth: 4 cm  Needle length: 8 cm  Assessment   Injection assessment: negative aspiration for heme, no paresthesia on injection, local visualized surrounding nerve on ultrasound and no intravascular symptoms  Slow fractionated injection: yes  Hemodynamics: stable  Outcomes: uncomplicated and patient tolerated procedure well    Additional Notes  Potential access sites were examined with ultrasound and the acceptable patent access site was selected (site noted above).  The needle path and vein access were visualized in real time using ultrasonography, and an image was recorded for permanent record.     0.2 % Ropivacaine with 4 mg decadron + epi  Medications Administered  dexAMETHasone

## 2024-07-31 NOTE — OP NOTE
[facility]  Open Lysis of adhesions Right Knee  Patient:Jose Sainz   : 1980  Medical Record Number:[id number]    Pre-operative Diagnosis: Presence of right artificial knee joint [Z96.651]    Post-operative Diagnosis: * No post-op diagnosis entered *    Location: [facility]    Date of Procedure: 2024    Surgeon: Arnav Elias MD    Assistant: Katiana Leroy CFA    Anesthesia: Spinal and adductor nerve block    Procedure:  Revision Right Total Knee Arthroplasty    Tourniquet Time: 0 minutes    EBL: 100cc    Complications: None    Patient Condition upon Completion of Procedure: Stable    Implants: * No implants in log *    Intra-Operative Findings:     Pre-operatively we assessed the motion of the patients knee. The knee extension was noted to be 0. Knee flexion was 120.  We noted no varus/valgus laxity throughout the arc of motion.    Prior to incision we noted a clunk about the patella right beofre the knee dropped into full extension.    Intra-operatively we noted that the femur tibia patellar components were well fixed.    Intra-operatively we encountered a large soft tissue/scar flap that was getting trapped in the trochlea. This was removed and the clunk we felt preop was gone        Description of Procedure:    The complexity of the total joint surgery requires the use of a first assistant. Katiana Leroy CFA was present for the entirety of the procedure and vital for the performance of the procedure. Katiana Leroy CFA assisted with positioning, prepping, draping of the patient before the procedure and instrument manipulation, extremity repositioning and camera operation during the procedure as well as wound closure, dressing application and brace application after the procedure. Please note that no intern, resident, or other hospital staff was available to assist during the surgery.     Jose Sainz was brought to the operating room, positioned on the

## 2024-07-31 NOTE — CARE COORDINATION
07/31/24 1706   Service Assessment   Patient Orientation Alert and Oriented   Cognition Alert   History Provided By Patient   Primary Caregiver Self   Accompanied By/Relationship Life Partner   Support Systems Spouse/Significant Other   Patient's Healthcare Decision Maker is: Legal Next of Kin   PCP Verified by CM Yes   Last Visit to PCP Within last year   Prior Functional Level Independent in ADLs/IADLs   Current Functional Level Assistance with the following:   Ability to make needs known: Good   Family able to assist with home care needs: Yes   Would you like for me to discuss the discharge plan with any other family members/significant others, and if so, who? No   Financial Resources Other (Comment)  (BC/BS)   Community Resources None   CM/SW Referral Other (see comment)  (New Revision of R TKA)     Sw reviewed chart and talked with pt and her Life Partner. Pt is a 43 yr old female who was adm today to have a R TKA revision. Pt informed she had a R TKA in recent past (5/10/2023) and that this surgery was a revision. Pt asked Rn if she would be spending the night since it is so late in the day. Life Partner did not speak during assessment. Pt was whispering to SW possibly due to having general anesthesia and having a sore throat. Pt informed she still has her walker and BSC from past surgery. She also clarified that she lives in [place]. Sw informed of HHC agencies who serve that area and pt believes she had [facility] in the past. Sw was able to clarify that pt had [facility] back in May. Sw to send ref in CC and will call agency tomorrow/or updon discharge.  Will monitor for any home IV antibiotic needs. Norma will cont to follow and assist.  Chika Singleton/ZAINAB

## 2024-07-31 NOTE — ANESTHESIA PRE PROCEDURE
disease (FM):, headaches: migraine headachesdepression/anxiety              ROS comment: Fibromyalgia  ACDF x2  Chronic neck and back pain GI/Hepatic/Renal:   (+) GERD: well controlled, liver disease (fatty liver):          Endo/Other:    (+) : arthritis: OA., Cancer: h/o bladder and breast cancer..  Cancer: h/o bladder and breast cancer.                ROS comment:    Abdominal:             Vascular: negative vascular ROS.         Other Findings:         Anesthesia Plan      general     ASA 3       Induction: intravenous.    MIPS: Postoperative opioids intended and Prophylactic antiemetics administered.  Anesthetic plan and risks discussed with patient.    Use of blood products discussed with patient whom consented to blood products.    Plan discussed with CRNA.          Post-op pain plan if not by surgeon: single peripheral nerve block          ARPIT HAYES JR, MD   7/31/2024

## 2024-07-31 NOTE — PLAN OF CARE
Problem: Pain  Goal: Verbalizes/displays adequate comfort level or baseline comfort level  7/31/2024 1942 by Key Saravia RN  Outcome: Progressing  7/31/2024 1723 by Iwona Mc RN  Outcome: Progressing     Problem: Safety - Adult  Goal: Free from fall injury  7/31/2024 1942 by Key Saravia RN  Outcome: Progressing  7/31/2024 1723 by Iwona Mc RN  Outcome: Progressing     Problem: ABCDS Injury Assessment  Goal: Absence of physical injury  7/31/2024 1942 by Key Saravia RN  Outcome: Progressing  7/31/2024 1723 by Iwona Mc RN  Outcome: Progressing     Problem: Discharge Planning  Goal: Discharge to home or other facility with appropriate resources  7/31/2024 1942 by Key Saravia RN  Outcome: Progressing  7/31/2024 1723 by Iwona Mc RN  Outcome: Progressing

## 2024-07-31 NOTE — PERIOP NOTE
TRANSFER - OUT REPORT:    Verbal report given to Iwona RN on Jose Sainz  being transferred to [facility] for routine post-op       Report consisted of patient's Situation, Background, Assessment and   Recommendations(SBAR).     Information from the following report(s) Adult Overview, Surgery Report, Intake/Output, and MAR was reviewed with the receiving nurse.    Lines:   Peripheral IV 07/31/24 Left;Posterior Forearm (Active)   Site Assessment Clean, dry & intact 07/31/24 1536   Line Status Infusing 07/31/24 1536   Line Care Connections checked and tightened 07/31/24 1536   Phlebitis Assessment No symptoms 07/31/24 1536   Infiltration Assessment 0 07/31/24 1536   Alcohol Cap Used No 07/31/24 1536   Dressing Status Clean, dry & intact 07/31/24 1536   Dressing Type Transparent 07/31/24 1536        Opportunity for questions and clarification was provided.      Patient transported with:  Tech

## 2024-07-31 NOTE — H&P
artificial knee joint       Assessment:   1. Painful Right knee replacement    Plan:   The patient has failed previous conservative treatment for this condition iRisks, benefits, alternatives and possible complications of revision right knee arthroplasty have been discussed with the patient including but not limited to potential for infection, bleeding, damage to nerves and/or blood vessels, stiffness of the knee after surgery, knee instability after surgery, patellar maltracking, potential for fracture both intra-op and post-op, polyethylene wear, implant loosening, and risk for revision surgery secondary to but not limited to these discussed risks. Further, we have discussed potential for venous thrombo-embolism including deep vein thrombosis and pulmonary embolism despite being on prophylaxis. Additionally, we have discussed potential for continued pain after surgery and patient has voiced understanding that I can make no guarantees regarding the pain relief of outcomes after surgery. We have also previously discussed the potential of morbidity and mortality associated with, but not limited to, the actual surgical procedure, anesthesia, prior medical conditions, and/or the administration of medications perioperatively. The patient has been given the opportunity to ask questions all of which have been answered and the patient wishes to proceed.     The patient was counseled at length about the risks of tali Covid-19 during their perioperative period and any recovery window from their procedure.  The patient was made aware that tali Covid-19  may worsen their prognosis for recovering from their procedure and lend to a higher morbidity and/or mortality risk.  All material risks, benefits, and reasonable alternatives including postponing the procedure were discussed. The patient does  wish to proceed with the procedure at this time.        Signed By: Arnav Elias MD  July 31, 2024

## 2024-07-31 NOTE — ANESTHESIA POSTPROCEDURE EVALUATION
Department of Anesthesiology  Postprocedure Note    Patient: Jose Sainz  MRN: [id number]  YOB: 1980  Date of evaluation: 7/31/2024    Procedure Summary       Date: 07/31/24 Room / Location: [facility] MAIN OR  / [facility] MAIN OR    Anesthesia Start: 1351 Anesthesia Stop: 1517    Procedure: RIGHT KNEE TOTAL ARTHROPLASTY REVISION, [place], Scar Tissue Excision, Prevena, Vanc, Cellerate, Tobra (Right: Knee) Diagnosis:       Presence of right artificial knee joint      (Presence of right artificial knee joint [Z96.651])    Surgeons: Arnav Elias MD Responsible Provider: Mukund Montenegro Jr., MD    Anesthesia Type: general ASA Status: 3            Anesthesia Type: No value filed.    Collette Phase I: Collette Score: 10    Collette Phase II:      Anesthesia Post Evaluation    Patient location during evaluation: PACU  Patient participation: complete - patient participated  Level of consciousness: awake  Pain score: 0  Airway patency: patent  Nausea & Vomiting: no nausea and no vomiting  Cardiovascular status: blood pressure returned to baseline and hemodynamically stable  Respiratory status: acceptable, spontaneous ventilation and nonlabored ventilation  Hydration status: euvolemic  Multimodal analgesia pain management approach  Pain management: adequate    No notable events documented.

## 2024-08-01 VITALS
TEMPERATURE: 98.2 F | SYSTOLIC BLOOD PRESSURE: 116 MMHG | HEIGHT: 61 IN | WEIGHT: 156.9 LBS | OXYGEN SATURATION: 95 % | RESPIRATION RATE: 18 BRPM | DIASTOLIC BLOOD PRESSURE: 83 MMHG | BODY MASS INDEX: 29.62 KG/M2 | HEART RATE: 69 BPM

## 2024-08-01 LAB
HCT VFR BLD AUTO: 35 % (ref 35.8–46.3)
HGB BLD-MCNC: 11.7 G/DL (ref 11.7–15.4)

## 2024-08-01 PROCEDURE — 36415 COLL VENOUS BLD VENIPUNCTURE: CPT

## 2024-08-01 PROCEDURE — 6360000002 HC RX W HCPCS: Performed by: NURSE PRACTITIONER

## 2024-08-01 PROCEDURE — 85014 HEMATOCRIT: CPT

## 2024-08-01 PROCEDURE — 97161 PT EVAL LOW COMPLEX 20 MIN: CPT

## 2024-08-01 PROCEDURE — 94640 AIRWAY INHALATION TREATMENT: CPT

## 2024-08-01 PROCEDURE — 6360000002 HC RX W HCPCS: Performed by: ORTHOPAEDIC SURGERY

## 2024-08-01 PROCEDURE — 97535 SELF CARE MNGMENT TRAINING: CPT

## 2024-08-01 PROCEDURE — 97530 THERAPEUTIC ACTIVITIES: CPT

## 2024-08-01 PROCEDURE — 2580000003 HC RX 258: Performed by: NURSE PRACTITIONER

## 2024-08-01 PROCEDURE — 6370000000 HC RX 637 (ALT 250 FOR IP): Performed by: ORTHOPAEDIC SURGERY

## 2024-08-01 PROCEDURE — 6370000000 HC RX 637 (ALT 250 FOR IP): Performed by: NURSE PRACTITIONER

## 2024-08-01 PROCEDURE — 97165 OT EVAL LOW COMPLEX 30 MIN: CPT

## 2024-08-01 PROCEDURE — 85018 HEMOGLOBIN: CPT

## 2024-08-01 RX ADMIN — BUDESONIDE 250 MCG: 0.25 SUSPENSION RESPIRATORY (INHALATION) at 08:01

## 2024-08-01 RX ADMIN — ONDANSETRON 4 MG: 4 TABLET, ORALLY DISINTEGRATING ORAL at 03:28

## 2024-08-01 RX ADMIN — ASPIRIN 81 MG: 81 TABLET, COATED ORAL at 07:28

## 2024-08-01 RX ADMIN — KETOROLAC TROMETHAMINE 15 MG: 15 INJECTION, SOLUTION INTRAMUSCULAR; INTRAVENOUS at 07:27

## 2024-08-01 RX ADMIN — SENNOSIDES AND DOCUSATE SODIUM 1 TABLET: 50; 8.6 TABLET ORAL at 07:28

## 2024-08-01 RX ADMIN — ARFORMOTEROL TARTRATE 15 MCG: 15 SOLUTION RESPIRATORY (INHALATION) at 08:01

## 2024-08-01 RX ADMIN — CYCLOBENZAPRINE HYDROCHLORIDE 5 MG: 5 TABLET, FILM COATED ORAL at 10:33

## 2024-08-01 RX ADMIN — OXYCODONE 10 MG: 5 TABLET ORAL at 07:28

## 2024-08-01 RX ADMIN — METOPROLOL SUCCINATE 100 MG: 100 TABLET, EXTENDED RELEASE ORAL at 07:28

## 2024-08-01 RX ADMIN — ONDANSETRON 4 MG: 4 TABLET, ORALLY DISINTEGRATING ORAL at 11:31

## 2024-08-01 RX ADMIN — CEFAZOLIN 2000 MG: 10 INJECTION, POWDER, FOR SOLUTION INTRAVENOUS at 06:24

## 2024-08-01 RX ADMIN — ACETAMINOPHEN 1000 MG: 500 TABLET, FILM COATED ORAL at 06:24

## 2024-08-01 RX ADMIN — PANTOPRAZOLE SODIUM 40 MG: 40 TABLET, DELAYED RELEASE ORAL at 06:24

## 2024-08-01 RX ADMIN — GABAPENTIN 600 MG: 300 CAPSULE ORAL at 06:24

## 2024-08-01 RX ADMIN — VENLAFAXINE HYDROCHLORIDE 75 MG: 75 CAPSULE, EXTENDED RELEASE ORAL at 07:28

## 2024-08-01 RX ADMIN — HYDROXYZINE HYDROCHLORIDE 25 MG: 25 TABLET, FILM COATED ORAL at 07:29

## 2024-08-01 RX ADMIN — SODIUM CHLORIDE, PRESERVATIVE FREE 10 ML: 5 INJECTION INTRAVENOUS at 07:30

## 2024-08-01 RX ADMIN — SODIUM CHLORIDE: 9 INJECTION, SOLUTION INTRAVENOUS at 03:30

## 2024-08-01 RX ADMIN — OXYCODONE 10 MG: 5 TABLET ORAL at 11:30

## 2024-08-01 RX ADMIN — OXYCODONE 10 MG: 5 TABLET ORAL at 03:29

## 2024-08-01 ASSESSMENT — PAIN SCALES - GENERAL
PAINLEVEL_OUTOF10: 9
PAINLEVEL_OUTOF10: 8
PAINLEVEL_OUTOF10: 0
PAINLEVEL_OUTOF10: 4
PAINLEVEL_OUTOF10: 7
PAINLEVEL_OUTOF10: 0
PAINLEVEL_OUTOF10: 3

## 2024-08-01 ASSESSMENT — PAIN DESCRIPTION - ORIENTATION
ORIENTATION: RIGHT

## 2024-08-01 ASSESSMENT — PAIN DESCRIPTION - DESCRIPTORS
DESCRIPTORS: ACHING;SORE
DESCRIPTORS: ACHING;SORE
DESCRIPTORS: ACHING

## 2024-08-01 ASSESSMENT — PAIN - FUNCTIONAL ASSESSMENT: PAIN_FUNCTIONAL_ASSESSMENT: ACTIVITIES ARE NOT PREVENTED

## 2024-08-01 ASSESSMENT — KOOS JR
BENDING TO THE FLOOR TO PICK UP OBJECT: MODERATE
STANDING UPRIGHT: MODERATE
HOW SEVERE IS YOUR KNEE STIFFNESS AFTER FIRST WAKING IN MORNING: MODERATE
RISING FROM SITTING: MODERATE
TWISING OR PIVOTING ON KNEE: MODERATE
GOING UP OR DOWN STAIRS: MODERATE
KOOS JR TOTAL INTERVAL SCORE: 52.465
STRAIGHTENING KNEE FULLY: MODERATE

## 2024-08-01 ASSESSMENT — PAIN DESCRIPTION - LOCATION
LOCATION: KNEE

## 2024-08-01 ASSESSMENT — PAIN DESCRIPTION - ONSET: ONSET: PROGRESSIVE

## 2024-08-01 ASSESSMENT — PAIN DESCRIPTION - FREQUENCY: FREQUENCY: CONTINUOUS

## 2024-08-01 ASSESSMENT — PAIN DESCRIPTION - PAIN TYPE: TYPE: ACUTE PAIN;SURGICAL PAIN

## 2024-08-01 NOTE — FLOWSHEET NOTE
08/01/24 1140   AVS Reviewed   AVS & discharge instructions reviewed with patient and/or representative? Yes   Reviewed instructions with Patient   Level of Understanding Questions answered;Teach back completed;Verbalized understanding

## 2024-08-01 NOTE — DISCHARGE INSTRUCTIONS
[facility]      Patient Discharge Instructions    Jose Sainz / [id number] : 1980    Admitted 2024 Discharged: 2024     IF YOU HAVE ANY PROBLEMS ONCE YOU ARE AT HOME CALL THE FOLLOWING NUMBERS:   Main office number: [phone]      Medications    The medications you are to continue on are listed on the medication reconciliation sheet.   Narcotic pain medications as well as supplemental iron can cause constipation. If this occurs try stopping the narcotic pain medication and/or the iron.   It is important that you take the medication exactly as they are prescribed.  Medications which increase your risk of blood clots are listed to stop for 5 weeks after surgery as well as medications or supplements which increase your risk of bleeding complications.   Keep your medication in the bottles provided by the pharmacist and keep a list of the medication names, dosages, and times to be taken in your wallet.   Do not take other medications without consulting your doctor.       Important Information    Do NOT smoke as this will greatly increase your risk of infection!    Resume your prehospital diet. If you have excessive nausea or vomitting call your doctor's office     Leg swelling and warmth is normal for 6 months after surgery. If you experience swelling in your leg elevate you leg while laying down with your toes above your heart. If you have sudden onset severe swelling with leg pain call our office. Use David Hose stockings until we see you in the office for your follow up appointment.    The stitches deep inside take approximately 6 months to dissolve. There will be sharp shooting, stinging and burning pain. This is normal and will resolve between 3-6 months after surgery.     Difficulty sleeping is normal following total Knee and Hip replacement. You may try melatonin, an over-the-counter sleep aid or benadryl to help with sleep. Most patients will resume sleeping through the night 8

## 2024-08-01 NOTE — PROGRESS NOTES
07/31/24 2016   Oxygen Therapy/Pulse Ox   O2 Therapy Room air   O2 Device None (Room air)   O2 Flow Rate (L/min) 0 L/min   Pulse 75   Respirations 16   SpO2 97 %   Skin Protection for O2 Device N/A   Pulse Oximeter Device Mode Continuous   Pulse Oximeter Device Location Finger;Left   $Pulse Oximeter $Spot check (multiple/continuous)     Pt on continuous monitor for tonight. Alarm limits set. Pt working on IS.  
4 Eyes Skin Assessment     NAME:  Jose Sainz  YOB: 1980  MEDICAL RECORD NUMBER:  [id number]    The patient is being assessed for  Admission    I agree that at least one RN has performed a thorough Head to Toe Skin Assessment on the patient. ALL assessment sites listed below have been assessed.      Areas assessed by both nurses:    Head, Face, Ears, Shoulders, Back, Chest, Arms, Elbows, Hands, Sacrum. Buttock, Coccyx, Ischium, and Legs. Feet and Heels        Does the Patient have a Wound? No noted wound(s)       Richie Prevention initiated by RN: Yes  Wound Care Orders initiated by RN: No    Pressure Injury (Stage 3,4, Unstageable, DTI, NWPT, and Complex wounds) if present, place Wound referral order by RN under : No    New Ostomies, if present place, Ostomy referral order under : No     Nurse 1 eSignature: Electronically signed by Iwona Mc RN on 7/31/24 at 5:41 PM EDT    **SHARE this note so that the co-signing nurse can place an eSignature**    Nurse 2 eSignature: Electronically signed by Rafaela Denton RN on 7/31/24 at 5:43 PM EDT   
TRANSFER - IN REPORT:    Verbal report received from AARON Saab on Jose Sainz  being received from PACU for routine progression of patient care      Report consisted of patient's Situation, Background, Assessment and   Recommendations(SBAR).     Information from the following report(s) Nurse Handoff Report, Adult Overview, Surgery Report, Intake/Output, and MAR was reviewed with the receiving nurse.    Opportunity for questions and clarification was provided.      Assessment completed upon patient's arrival to unit and care assumed.    
20    Maricruz Love, OT               
Raises 20        Knee Slides 20 AA        Short Arc Quads 20        Chair Slides 20                          B = bilateral; AA = active assistive; A = active; P = passive      EDUCATION:  [x] Home Exercises  [x] Fall Precautions  [] No Pillow Under Knee  [x] D/C Instruction Review [x] Cryocuff  [x] Walker Management/Safety  [] Adaptive Equipment as Needed     Interdisciplinary Patient Education   (Reference education tab)    AFTER TREATMENT PRECAUTIONS: Bed/Chair Locked, Call light within reach, Chair, Needs within reach, and RN notified    INTERDISCIPLINARY COLLABORATION:  RN/ PCT    COMPLIANCE WITH PROGRAM/EXERCISE: Will assess as treatment progresses.    RECOMMENDATIONS/INTENT FOR NEXT TREATMENT SESSION: Treatment next visit will focus on increasing Ms. Sainz's independence with bed mobility, transfers, gait training, strength/ROM exercises, modalities for pain, and patient education.     TIME IN/OUT:  Time In: 0835  Time Out: 0930  Minutes: 55    Mia Clemens, PT

## 2024-08-07 DIAGNOSIS — Z96.651 STATUS POST REVISION OF TOTAL REPLACEMENT OF RIGHT KNEE: Primary | ICD-10-CM

## 2024-08-07 RX ORDER — OXYCODONE HYDROCHLORIDE 5 MG/1
10 TABLET ORAL EVERY 4 HOURS PRN
Qty: 60 TABLET | Refills: 0 | Status: SHIPPED | OUTPATIENT
Start: 2024-08-07 | End: 2024-08-14

## 2024-08-20 ENCOUNTER — OFFICE VISIT (OUTPATIENT)
Dept: ORTHOPEDIC SURGERY | Age: 44
End: 2024-08-20

## 2024-08-20 DIAGNOSIS — Z96.651 HISTORY OF TOTAL RIGHT KNEE REPLACEMENT: Primary | ICD-10-CM

## 2024-08-20 DIAGNOSIS — Z51.89 VISIT FOR WOUND CHECK: ICD-10-CM

## 2024-08-20 RX ORDER — OXYCODONE HYDROCHLORIDE 5 MG/1
5 TABLET ORAL EVERY 4 HOURS PRN
Qty: 30 TABLET | Refills: 0 | Status: SHIPPED | OUTPATIENT
Start: 2024-08-20 | End: 2024-08-25

## 2024-08-20 NOTE — PROGRESS NOTES
Patient ID:  Jose Sainz  [id number]  44 y.o.  1980    Today: August 20, 2024               Allergies:   Allergies   Allergen Reactions    Morphine Nausea And Vomiting and Rash    Sulfa Antibiotics Rash        CC:Incision Check Right Knee    HPI:   History: The patient presents today for an incision check of the right knee and staple removal.  The patient underwent  revision right total knee with scar tissue excision.   The patient has been progressing with physical therapy.     Past Medical/Surgical History:  Past Medical History:   Diagnosis Date    Adjustment disorder with anxiety     managed with medication    Asthma     daily inhaler, PRN inhaler--followed by PCP    Fatty liver     Fibromyalgia     Former cigarette smoker     GERD (gastroesophageal reflux disease)     History of bladder cancer 2005    6 weeks of BCG--per PCP note \" She has not seen a urologist in several years, and would benefit from repeat cystoscopy or follow-up. \"    History of blood transfusion 2013    History of breast cancer     Tamoxifen for 3 years/ Right breast- lumpectomy    History of COVID-19 12/2020    pt hospitalized on BIPAP    History of gastric ulcer     PCP has referred pt to GI    Liver tumor     PCP has referred pt to GI--per PCP note \"Chronic. resolved. Pt reports s/p arterial embolization with \"cure\". No further imaging to elucidate this. Will refer to GI for further management and consider ordering RUQ US prior to appt with them \"    Mitral valve prolapse     echo (2/17/23) states \"The mitral valve is normal. No mitral valve stenosis. Trace mitral valve regurgitation. \"    PONV (postoperative nausea and vomiting)     post-op. Pt does well with antiemetic    Prolonged emergence from general anesthesia     Retention of urine     X1 with cervical fusion.    Seasonal allergies     SVT (supraventricular tachycardia) (HCC)     hx-on Metoprolol, followed by Cardiology, per most recent note (2/9/24) \"She has not

## 2024-08-23 RX ORDER — ASPIRIN 81 MG/1
81 TABLET, COATED ORAL 2 TIMES DAILY
Qty: 180 TABLET | Refills: 1 | OUTPATIENT
Start: 2024-08-23

## 2024-08-26 ENCOUNTER — PATIENT MESSAGE (OUTPATIENT)
Dept: ORTHOPEDIC SURGERY | Age: 44
End: 2024-08-26

## 2024-08-26 DIAGNOSIS — Z96.651 STATUS POST REVISION OF TOTAL REPLACEMENT OF RIGHT KNEE: Primary | ICD-10-CM

## 2024-08-28 RX ORDER — OXYCODONE HYDROCHLORIDE 5 MG/1
10 TABLET ORAL EVERY 4 HOURS PRN
Qty: 60 TABLET | Refills: 0 | Status: SHIPPED | OUTPATIENT
Start: 2024-08-28 | End: 2024-09-04

## 2024-09-09 DIAGNOSIS — G89.18 POSTOPERATIVE PAIN: Primary | ICD-10-CM

## 2024-09-09 RX ORDER — HYDROCODONE BITARTRATE AND ACETAMINOPHEN 5; 325 MG/1; MG/1
1 TABLET ORAL EVERY 8 HOURS PRN
Qty: 21 TABLET | Refills: 0 | Status: SHIPPED | OUTPATIENT
Start: 2024-09-09 | End: 2024-09-16

## 2024-09-10 DIAGNOSIS — G89.18 POSTOPERATIVE PAIN: Primary | ICD-10-CM

## 2024-09-10 RX ORDER — HYDROCODONE BITARTRATE AND ACETAMINOPHEN 5; 325 MG/1; MG/1
1 TABLET ORAL EVERY 8 HOURS PRN
Qty: 21 TABLET | Refills: 0 | Status: SHIPPED | OUTPATIENT
Start: 2024-09-10 | End: 2024-09-17

## 2024-09-16 DIAGNOSIS — R52 PAIN: Primary | ICD-10-CM

## 2024-09-16 RX ORDER — TRAMADOL HYDROCHLORIDE 50 MG/1
50 TABLET ORAL EVERY 4 HOURS PRN
Qty: 42 TABLET | Refills: 0 | Status: SHIPPED | OUTPATIENT
Start: 2024-09-16 | End: 2024-09-23

## 2024-09-16 RX ORDER — TRAZODONE HYDROCHLORIDE 50 MG/1
50 TABLET, FILM COATED ORAL NIGHTLY PRN
Qty: 30 TABLET | Refills: 0 | Status: SHIPPED | OUTPATIENT
Start: 2024-09-16

## 2024-10-01 DIAGNOSIS — G89.18 POSTOPERATIVE PAIN: Primary | ICD-10-CM

## 2024-10-01 RX ORDER — TRAMADOL HYDROCHLORIDE 50 MG/1
50 TABLET ORAL EVERY 4 HOURS PRN
Qty: 30 TABLET | Refills: 0 | Status: SHIPPED | OUTPATIENT
Start: 2024-10-01 | End: 2024-10-06

## 2024-10-07 RX ORDER — TRAZODONE HYDROCHLORIDE 50 MG/1
50 TABLET, FILM COATED ORAL DAILY PRN
Qty: 30 TABLET | Refills: 0 | OUTPATIENT
Start: 2024-10-07

## 2024-10-17 DIAGNOSIS — R52 PAIN: Primary | ICD-10-CM

## 2024-10-17 RX ORDER — TRAMADOL HYDROCHLORIDE 50 MG/1
50 TABLET ORAL EVERY 4 HOURS PRN
Qty: 42 TABLET | Refills: 0 | Status: SHIPPED | OUTPATIENT
Start: 2024-10-17 | End: 2024-10-24

## 2024-10-17 RX ORDER — TRAZODONE HYDROCHLORIDE 50 MG/1
50 TABLET, FILM COATED ORAL NIGHTLY PRN
Qty: 30 TABLET | Refills: 0 | Status: SHIPPED | OUTPATIENT
Start: 2024-10-17

## 2024-11-21 DIAGNOSIS — G89.18 POSTOPERATIVE PAIN: Primary | ICD-10-CM

## 2024-11-21 RX ORDER — TRAMADOL HYDROCHLORIDE 50 MG/1
50 TABLET ORAL EVERY 4 HOURS PRN
Qty: 42 TABLET | Refills: 0 | Status: SHIPPED | OUTPATIENT
Start: 2024-11-21 | End: 2024-11-28

## 2024-11-21 RX ORDER — TRAZODONE HYDROCHLORIDE 50 MG/1
50 TABLET, FILM COATED ORAL NIGHTLY PRN
Qty: 30 TABLET | Refills: 0 | Status: SHIPPED | OUTPATIENT
Start: 2024-11-21

## 2025-01-21 DIAGNOSIS — R52 PAIN: Primary | ICD-10-CM

## 2025-01-21 RX ORDER — TRAMADOL HYDROCHLORIDE 50 MG/1
50 TABLET ORAL EVERY 4 HOURS PRN
Qty: 42 TABLET | Refills: 0 | Status: SHIPPED | OUTPATIENT
Start: 2025-01-21 | End: 2025-01-28

## (undated) DEVICE — PADDING CAST W3INXL4YD COT BLEND MIC PLEAT UNDERCAST SPEC

## (undated) DEVICE — SOLUTION IV 250ML 0.9% SOD CHL PH 5 INJ USP VIAFLX PLAS

## (undated) DEVICE — Device

## (undated) DEVICE — ZIMMER® STERILE DISPOSABLE TOURNIQUET CUFF WITH PLC, DUAL PORT, SINGLE BLADDER, 18 IN. (46 CM)

## (undated) DEVICE — DRAPE,TOP,102X53,STERILE: Brand: MEDLINE

## (undated) DEVICE — BNDG,ELSTC,MATRIX,STRL,6"X5YD,LF,HOOK&LP: Brand: MEDLINE

## (undated) DEVICE — PADDING CAST W4INXL4YD ST COT COHESIVE HND TEARABLE SPEC

## (undated) DEVICE — STERILE PVP: Brand: MEDLINE INDUSTRIES, INC.

## (undated) DEVICE — PIN BNE FIX TEMP L110MM DIA4MM MAKO

## (undated) DEVICE — DRESSING,GAUZE,XEROFORM,CURAD,1"X8",ST: Brand: CURAD

## (undated) DEVICE — BANDAGE COMPR L5YDXW2IN FOAM CO FLX

## (undated) DEVICE — COTTON UNDERCAST PADDING,REGULAR FINISH: Brand: WEBRIL

## (undated) DEVICE — SUTURE STRATAFIX SPRL SZ 1 L14IN ABSRB VLT L48CM CTX 1/2 SXPD2B405

## (undated) DEVICE — BIPOLAR SEALER 23-313-1 AQM 9.5XL: Brand: AQUAMANTYS ®

## (undated) DEVICE — GLOVE SURG SZ 6 L12IN FNGR THK79MIL GRN LTX FREE

## (undated) DEVICE — SUTURE STRATAFIX SPRL MCRYL + SZ 4-0 L12IN ABSRB UD PS-2 SXMP1B117

## (undated) DEVICE — BLADE SURG SAW S STL NAR OSC W/ SERR EDGE DISP

## (undated) DEVICE — BNDG,ELSTC,MATRIX,STRL,4"X5YD,LF,HOOK&LP: Brand: MEDLINE

## (undated) DEVICE — SOLUTION IRRIG 1000ML STRL H2O USP PLAS POUR BTL

## (undated) DEVICE — BLADE OPHTH DIA4MM MINI MENIS FLAT ARTHRO LOK

## (undated) DEVICE — SYRINGE MED 30ML STD CLR PLAS LUERLOCK TIP N CTRL DISP

## (undated) DEVICE — KIT TRK KNEE PROC VIZADISC

## (undated) DEVICE — GLOVE ORANGE PI 7 1/2   MSG9075

## (undated) DEVICE — GLOVE SURG SZ 8 L12IN FNGR THK79MIL GRN LTX FREE

## (undated) DEVICE — SUTURE STRATAFIX SPRL MCRYL + SZ 2-0 L18IN ABSRB UD CT-1 SXMP1B413

## (undated) DEVICE — SOLUTION IRRIG 3000ML 0.9% SOD CHL USP UROMATIC PLAS CONT

## (undated) DEVICE — KIT DRP FOR RIO ROBOTIC ARM ASST SYS

## (undated) DEVICE — GEL US 20GM NONIRRITATING OVERWRAPPED FILE PCH TRNSMIT

## (undated) DEVICE — STRIP,CLOSURE,WOUND,MEDI-STRIP,1/2X4: Brand: MEDLINE

## (undated) DEVICE — HAND PACK: Brand: MEDLINE INDUSTRIES, INC.

## (undated) DEVICE — SUTURE NONABSORBABLE MONOFILAMENT 4-0 PS-2 18 IN BLU PROLENE 8682H

## (undated) DEVICE — DRESSING HYDROFIBER AQUACEL AG ADVANTAGE 3.5X12 IN

## (undated) DEVICE — SUTURE VCRL SZ 4-0 L27IN ABSRB UD L19MM PS-2 3/8 CIR PRIM J426H

## (undated) DEVICE — YANKAUER,FLEXIBLE HANDLE,REGLR CAPACITY: Brand: MEDLINE INDUSTRIES, INC.

## (undated) DEVICE — BNDG,ELSTC,MATRIX,STRL,2"X5YD,LF,HOOK&LP: Brand: MEDLINE

## (undated) DEVICE — DISPOSABLE BIPOLAR CODE, 12' (3.66 M): Brand: CONMED

## (undated) DEVICE — SOLUTION IRRIG 1000ML 09% SOD CHL USP PIC PLAS CONTAINER

## (undated) DEVICE — SUTURE VCRL RAPIDE SZ 4-0 L18IN ABSRB UD L19MM PS-2 1/2 CIR VR496

## (undated) DEVICE — PIN BNE FIX TEMP L140MM DIA4MM MAKO

## (undated) DEVICE — STRYKER PERFORMANCE SERIES SAGITTAL BLADE: Brand: STRYKER PERFORMANCE SERIES

## (undated) DEVICE — TOTAL KNEE DR WATSON: Brand: MEDLINE INDUSTRIES, INC.

## (undated) DEVICE — SLING ARM AD ULT

## (undated) DEVICE — SUTURE PROL SZ 4-0 L18IN NONABSORBABLE BLU L19MM PS-2 3/8 8682G

## (undated) DEVICE — SOLUTION IRRIG 1000ML 0.9% SOD CHL USP POUR PLAS BTL

## (undated) DEVICE — STERILE PRESSURE PROTECTOR PAD® FOR DE MAYO UNIVERSAL DISTRACTOR® (10/CASE): Brand: DE MAYO UNIVERSAL DISTRACTOR®

## (undated) DEVICE — KIT INT FIX FEM TIB CKPT MAKOPLASTY

## (undated) DEVICE — SUTURE ETHLN SZ 3-0 L18IN NONABSORBABLE BLK PS-2 L19MM 3/8 1669H

## (undated) DEVICE — SUTURE MCRYL SZ 2-0 L27IN ABSRB UD CP-1 1 L36MM 1/2 CIR REV Y266H

## (undated) DEVICE — 450 ML BOTTLE OF 0.05% CHLORHEXIDINE GLUCONATE IN 99.95% STERILE WATER FOR IRRIGATION, USP AND APPLICATOR.: Brand: IRRISEPT ANTIMICROBIAL WOUND LAVAGE